# Patient Record
Sex: FEMALE | Race: WHITE | NOT HISPANIC OR LATINO
[De-identification: names, ages, dates, MRNs, and addresses within clinical notes are randomized per-mention and may not be internally consistent; named-entity substitution may affect disease eponyms.]

---

## 2017-01-09 ENCOUNTER — APPOINTMENT (OUTPATIENT)
Age: 57
End: 2017-01-09

## 2017-02-13 ENCOUNTER — APPOINTMENT (OUTPATIENT)
Age: 57
End: 2017-02-13

## 2017-03-06 ENCOUNTER — APPOINTMENT (OUTPATIENT)
Age: 57
End: 2017-03-06

## 2017-03-06 VITALS
WEIGHT: 170 LBS | SYSTOLIC BLOOD PRESSURE: 147 MMHG | HEIGHT: 65 IN | DIASTOLIC BLOOD PRESSURE: 86 MMHG | RESPIRATION RATE: 14 BRPM | HEART RATE: 75 BPM | TEMPERATURE: 97.9 F | BODY MASS INDEX: 28.32 KG/M2

## 2017-03-06 LAB
BASOPHILS # BLD AUTO: 0.04 K/UL
BASOPHILS NFR BLD AUTO: 0.6 %
EOSINOPHIL # BLD AUTO: 0.13 K/UL
EOSINOPHIL NFR BLD AUTO: 2.1 %
HCT VFR BLD CALC: 39.1 %
HGB BLD-MCNC: 13.3 G/DL
IMM GRANULOCYTES NFR BLD AUTO: 0.2 %
LYMPHOCYTES # BLD AUTO: 2.21 K/UL
LYMPHOCYTES NFR BLD AUTO: 35.3 %
MAN DIFF?: NORMAL
MCHC RBC-ENTMCNC: 29.3 PG
MCHC RBC-ENTMCNC: 34 GM/DL
MCV RBC AUTO: 86.1 FL
MONOCYTES # BLD AUTO: 0.43 K/UL
MONOCYTES NFR BLD AUTO: 6.9 %
NEUTROPHILS # BLD AUTO: 3.44 K/UL
NEUTROPHILS NFR BLD AUTO: 54.9 %
PLATELET # BLD AUTO: 279 K/UL
RBC # BLD: 4.54 M/UL
RBC # FLD: 13.5 %
WBC # FLD AUTO: 6.26 K/UL

## 2017-03-07 LAB
AFP-TM SERPL-MCNC: 7.1 NG/ML
ALBUMIN SERPL ELPH-MCNC: 4.8 G/DL
ALP BLD-CCNC: 61 U/L
ALT SERPL-CCNC: 17 U/L
ANION GAP SERPL CALC-SCNC: 17 MMOL/L
AST SERPL-CCNC: 29 U/L
BILIRUB SERPL-MCNC: 0.4 MG/DL
BUN SERPL-MCNC: 29 MG/DL
CALCIUM SERPL-MCNC: 10.1 MG/DL
CHLORIDE SERPL-SCNC: 97 MMOL/L
CO2 SERPL-SCNC: 23 MMOL/L
CREAT SERPL-MCNC: 0.95 MG/DL
GLUCOSE SERPL-MCNC: 102 MG/DL
POTASSIUM SERPL-SCNC: 3.7 MMOL/L
PROT SERPL-MCNC: 7.8 G/DL
SODIUM SERPL-SCNC: 137 MMOL/L

## 2017-03-08 LAB
HCV RNA SERPL NAA DL=5-ACNC: NOT DETECTED IU/ML
HCV RNA SERPL NAA+PROBE-LOG IU: NOT DETECTED LOGIU/ML

## 2017-03-16 ENCOUNTER — APPOINTMENT (OUTPATIENT)
Dept: ULTRASOUND IMAGING | Facility: CLINIC | Age: 57
End: 2017-03-16

## 2017-03-22 ENCOUNTER — APPOINTMENT (OUTPATIENT)
Dept: ULTRASOUND IMAGING | Facility: CLINIC | Age: 57
End: 2017-03-22

## 2017-03-22 ENCOUNTER — OUTPATIENT (OUTPATIENT)
Dept: OUTPATIENT SERVICES | Facility: HOSPITAL | Age: 57
LOS: 1 days | End: 2017-03-22
Payer: MEDICAID

## 2017-03-22 DIAGNOSIS — B18.2 CHRONIC VIRAL HEPATITIS C: ICD-10-CM

## 2017-03-22 PROCEDURE — 76700 US EXAM ABDOM COMPLETE: CPT

## 2018-02-09 ENCOUNTER — LABORATORY RESULT (OUTPATIENT)
Age: 58
End: 2018-02-09

## 2018-02-10 LAB
AFP-TM SERPL-MCNC: 7.2 NG/ML
BASOPHILS # BLD AUTO: 0.04 K/UL
BASOPHILS NFR BLD AUTO: 0.5 %
EOSINOPHIL # BLD AUTO: 0.24 K/UL
EOSINOPHIL NFR BLD AUTO: 3.2 %
HCT VFR BLD CALC: 40.9 %
HGB BLD-MCNC: 13.9 G/DL
IMM GRANULOCYTES NFR BLD AUTO: 0.3 %
LYMPHOCYTES # BLD AUTO: 2.93 K/UL
LYMPHOCYTES NFR BLD AUTO: 38.9 %
MAN DIFF?: NORMAL
MCHC RBC-ENTMCNC: 30.2 PG
MCHC RBC-ENTMCNC: 34 GM/DL
MCV RBC AUTO: 88.9 FL
MONOCYTES # BLD AUTO: 0.6 K/UL
MONOCYTES NFR BLD AUTO: 8 %
NEUTROPHILS # BLD AUTO: 3.7 K/UL
NEUTROPHILS NFR BLD AUTO: 49.1 %
PLATELET # BLD AUTO: 284 K/UL
RBC # BLD: 4.6 M/UL
RBC # FLD: 12.9 %
WBC # FLD AUTO: 7.53 K/UL

## 2018-02-12 ENCOUNTER — APPOINTMENT (OUTPATIENT)
Age: 58
End: 2018-02-12
Payer: MEDICAID

## 2018-02-12 VITALS
BODY MASS INDEX: 27.32 KG/M2 | HEART RATE: 84 BPM | HEIGHT: 65 IN | WEIGHT: 164 LBS | SYSTOLIC BLOOD PRESSURE: 130 MMHG | RESPIRATION RATE: 14 BRPM | DIASTOLIC BLOOD PRESSURE: 84 MMHG | TEMPERATURE: 98.3 F

## 2018-02-12 LAB
ALBUMIN SERPL ELPH-MCNC: 5 G/DL
ALP BLD-CCNC: 60 U/L
ALT SERPL-CCNC: 23 U/L
ANION GAP SERPL CALC-SCNC: 19 MMOL/L
AST SERPL-CCNC: 29 U/L
BILIRUB SERPL-MCNC: 0.3 MG/DL
BUN SERPL-MCNC: 41 MG/DL
CALCIUM SERPL-MCNC: 9.9 MG/DL
CHLORIDE SERPL-SCNC: 93 MMOL/L
CO2 SERPL-SCNC: 23 MMOL/L
CREAT SERPL-MCNC: 1.33 MG/DL
HCV RNA SERPL NAA DL=5-ACNC: NOT DETECTED IU/ML
HCV RNA SERPL NAA+PROBE-LOG IU: NOT DETECTED LOGIU/ML
POTASSIUM SERPL-SCNC: 4.3 MMOL/L
PROT SERPL-MCNC: 8.4 G/DL
SODIUM SERPL-SCNC: 135 MMOL/L

## 2018-02-12 PROCEDURE — 99213 OFFICE O/P EST LOW 20 MIN: CPT

## 2018-03-21 ENCOUNTER — APPOINTMENT (OUTPATIENT)
Age: 58
End: 2018-03-21

## 2018-05-04 ENCOUNTER — APPOINTMENT (OUTPATIENT)
Dept: HEPATOLOGY | Facility: CLINIC | Age: 58
End: 2018-05-04
Payer: MEDICAID

## 2018-05-04 PROCEDURE — 91200 LIVER ELASTOGRAPHY: CPT

## 2019-05-21 ENCOUNTER — APPOINTMENT (OUTPATIENT)
Dept: HEPATOLOGY | Facility: CLINIC | Age: 59
End: 2019-05-21

## 2019-06-12 ENCOUNTER — APPOINTMENT (OUTPATIENT)
Dept: HEPATOLOGY | Facility: CLINIC | Age: 59
End: 2019-06-12

## 2019-09-04 ENCOUNTER — APPOINTMENT (OUTPATIENT)
Dept: HEPATOLOGY | Facility: CLINIC | Age: 59
End: 2019-09-04

## 2019-10-11 ENCOUNTER — INPATIENT (INPATIENT)
Facility: HOSPITAL | Age: 59
LOS: 0 days | Discharge: HOME | End: 2019-10-12
Attending: FAMILY MEDICINE | Admitting: FAMILY MEDICINE
Payer: COMMERCIAL

## 2019-10-11 VITALS
SYSTOLIC BLOOD PRESSURE: 146 MMHG | TEMPERATURE: 98 F | DIASTOLIC BLOOD PRESSURE: 96 MMHG | RESPIRATION RATE: 16 BRPM | OXYGEN SATURATION: 100 % | HEIGHT: 66 IN | WEIGHT: 158.95 LBS | HEART RATE: 88 BPM

## 2019-10-11 DIAGNOSIS — I10 ESSENTIAL (PRIMARY) HYPERTENSION: ICD-10-CM

## 2019-10-11 DIAGNOSIS — R07.1 CHEST PAIN ON BREATHING: ICD-10-CM

## 2019-10-11 DIAGNOSIS — E78.2 MIXED HYPERLIPIDEMIA: ICD-10-CM

## 2019-10-11 DIAGNOSIS — Z72.0 TOBACCO USE: ICD-10-CM

## 2019-10-11 LAB
ALBUMIN SERPL ELPH-MCNC: 4.1 G/DL — SIGNIFICANT CHANGE UP (ref 3.3–5)
ALP SERPL-CCNC: 44 U/L — SIGNIFICANT CHANGE UP (ref 40–120)
ALT FLD-CCNC: 23 U/L — SIGNIFICANT CHANGE UP (ref 12–78)
ANION GAP SERPL CALC-SCNC: 8 MMOL/L — SIGNIFICANT CHANGE UP (ref 5–17)
APTT BLD: 30.7 SEC — SIGNIFICANT CHANGE UP (ref 27.5–36.3)
AST SERPL-CCNC: 22 U/L — SIGNIFICANT CHANGE UP (ref 15–37)
BASOPHILS # BLD AUTO: 0.05 K/UL — SIGNIFICANT CHANGE UP (ref 0–0.2)
BASOPHILS NFR BLD AUTO: 0.7 % — SIGNIFICANT CHANGE UP (ref 0–2)
BILIRUB SERPL-MCNC: 0.3 MG/DL — SIGNIFICANT CHANGE UP (ref 0.2–1.2)
BUN SERPL-MCNC: 30 MG/DL — HIGH (ref 7–23)
CALCIUM SERPL-MCNC: 9.2 MG/DL — SIGNIFICANT CHANGE UP (ref 8.5–10.1)
CHLORIDE SERPL-SCNC: 102 MMOL/L — SIGNIFICANT CHANGE UP (ref 96–108)
CK MB BLD-MCNC: <0.7 % — SIGNIFICANT CHANGE UP (ref 0–3.5)
CK MB CFR SERPL CALC: <1 NG/ML — SIGNIFICANT CHANGE UP (ref 0.5–3.6)
CK SERPL-CCNC: 139 U/L — SIGNIFICANT CHANGE UP (ref 26–192)
CO2 SERPL-SCNC: 27 MMOL/L — SIGNIFICANT CHANGE UP (ref 22–31)
CREAT SERPL-MCNC: 1.05 MG/DL — SIGNIFICANT CHANGE UP (ref 0.5–1.3)
EOSINOPHIL # BLD AUTO: 0.15 K/UL — SIGNIFICANT CHANGE UP (ref 0–0.5)
EOSINOPHIL NFR BLD AUTO: 2 % — SIGNIFICANT CHANGE UP (ref 0–6)
GLUCOSE SERPL-MCNC: 100 MG/DL — HIGH (ref 70–99)
HCT VFR BLD CALC: 35.9 % — SIGNIFICANT CHANGE UP (ref 34.5–45)
HGB BLD-MCNC: 12.5 G/DL — SIGNIFICANT CHANGE UP (ref 11.5–15.5)
IMM GRANULOCYTES NFR BLD AUTO: 0.3 % — SIGNIFICANT CHANGE UP (ref 0–1.5)
INR BLD: 1.09 RATIO — SIGNIFICANT CHANGE UP (ref 0.88–1.16)
LYMPHOCYTES # BLD AUTO: 1.89 K/UL — SIGNIFICANT CHANGE UP (ref 1–3.3)
LYMPHOCYTES # BLD AUTO: 25.3 % — SIGNIFICANT CHANGE UP (ref 13–44)
MCHC RBC-ENTMCNC: 30.2 PG — SIGNIFICANT CHANGE UP (ref 27–34)
MCHC RBC-ENTMCNC: 34.8 GM/DL — SIGNIFICANT CHANGE UP (ref 32–36)
MCV RBC AUTO: 86.7 FL — SIGNIFICANT CHANGE UP (ref 80–100)
MONOCYTES # BLD AUTO: 0.58 K/UL — SIGNIFICANT CHANGE UP (ref 0–0.9)
MONOCYTES NFR BLD AUTO: 7.8 % — SIGNIFICANT CHANGE UP (ref 2–14)
NEUTROPHILS # BLD AUTO: 4.77 K/UL — SIGNIFICANT CHANGE UP (ref 1.8–7.4)
NEUTROPHILS NFR BLD AUTO: 63.9 % — SIGNIFICANT CHANGE UP (ref 43–77)
NRBC # BLD: 0 /100 WBCS — SIGNIFICANT CHANGE UP (ref 0–0)
PLATELET # BLD AUTO: 306 K/UL — SIGNIFICANT CHANGE UP (ref 150–400)
POTASSIUM SERPL-MCNC: 3.4 MMOL/L — LOW (ref 3.5–5.3)
POTASSIUM SERPL-SCNC: 3.4 MMOL/L — LOW (ref 3.5–5.3)
PROT SERPL-MCNC: 7.4 GM/DL — SIGNIFICANT CHANGE UP (ref 6–8.3)
PROTHROM AB SERPL-ACNC: 12.2 SEC — SIGNIFICANT CHANGE UP (ref 10–12.9)
RBC # BLD: 4.14 M/UL — SIGNIFICANT CHANGE UP (ref 3.8–5.2)
RBC # FLD: 12.6 % — SIGNIFICANT CHANGE UP (ref 10.3–14.5)
SODIUM SERPL-SCNC: 137 MMOL/L — SIGNIFICANT CHANGE UP (ref 135–145)
TROPONIN I SERPL-MCNC: <.015 NG/ML — SIGNIFICANT CHANGE UP (ref 0.01–0.04)
WBC # BLD: 7.46 K/UL — SIGNIFICANT CHANGE UP (ref 3.8–10.5)
WBC # FLD AUTO: 7.46 K/UL — SIGNIFICANT CHANGE UP (ref 3.8–10.5)

## 2019-10-11 PROCEDURE — 99223 1ST HOSP IP/OBS HIGH 75: CPT

## 2019-10-11 PROCEDURE — 71046 X-RAY EXAM CHEST 2 VIEWS: CPT | Mod: 26

## 2019-10-11 PROCEDURE — 99285 EMERGENCY DEPT VISIT HI MDM: CPT

## 2019-10-11 PROCEDURE — 93010 ELECTROCARDIOGRAM REPORT: CPT

## 2019-10-11 RX ORDER — ASPIRIN/CALCIUM CARB/MAGNESIUM 324 MG
324 TABLET ORAL DAILY
Refills: 0 | Status: DISCONTINUED | OUTPATIENT
Start: 2019-10-11 | End: 2019-10-12

## 2019-10-11 RX ORDER — QUETIAPINE FUMARATE 200 MG/1
100 TABLET, FILM COATED ORAL AT BEDTIME
Refills: 0 | Status: DISCONTINUED | OUTPATIENT
Start: 2019-10-11 | End: 2019-10-12

## 2019-10-11 RX ORDER — BUDESONIDE AND FORMOTEROL FUMARATE DIHYDRATE 160; 4.5 UG/1; UG/1
2 AEROSOL RESPIRATORY (INHALATION)
Refills: 0 | Status: DISCONTINUED | OUTPATIENT
Start: 2019-10-11 | End: 2019-10-12

## 2019-10-11 RX ORDER — ALPRAZOLAM 0.25 MG
0.5 TABLET ORAL AT BEDTIME
Refills: 0 | Status: DISCONTINUED | OUTPATIENT
Start: 2019-10-11 | End: 2019-10-12

## 2019-10-11 RX ORDER — SIMVASTATIN 20 MG/1
20 TABLET, FILM COATED ORAL AT BEDTIME
Refills: 0 | Status: DISCONTINUED | OUTPATIENT
Start: 2019-10-11 | End: 2019-10-12

## 2019-10-11 RX ORDER — MONTELUKAST 4 MG/1
10 TABLET, CHEWABLE ORAL DAILY
Refills: 0 | Status: DISCONTINUED | OUTPATIENT
Start: 2019-10-11 | End: 2019-10-12

## 2019-10-11 RX ORDER — SERTRALINE 25 MG/1
25 TABLET, FILM COATED ORAL DAILY
Refills: 0 | Status: DISCONTINUED | OUTPATIENT
Start: 2019-10-11 | End: 2019-10-12

## 2019-10-11 RX ORDER — ALBUTEROL 90 UG/1
2 AEROSOL, METERED ORAL
Refills: 0 | Status: DISCONTINUED | OUTPATIENT
Start: 2019-10-11 | End: 2019-10-12

## 2019-10-11 RX ORDER — INFLUENZA VIRUS VACCINE 15; 15; 15; 15 UG/.5ML; UG/.5ML; UG/.5ML; UG/.5ML
0.5 SUSPENSION INTRAMUSCULAR ONCE
Refills: 0 | Status: COMPLETED | OUTPATIENT
Start: 2019-10-11 | End: 2019-10-11

## 2019-10-11 RX ORDER — HYDROCHLOROTHIAZIDE 25 MG
12.5 TABLET ORAL DAILY
Refills: 0 | Status: DISCONTINUED | OUTPATIENT
Start: 2019-10-11 | End: 2019-10-12

## 2019-10-11 RX ORDER — ALPRAZOLAM 0.25 MG
1 TABLET ORAL
Qty: 0 | Refills: 0 | DISCHARGE

## 2019-10-11 RX ORDER — AMLODIPINE BESYLATE 2.5 MG/1
5 TABLET ORAL DAILY
Refills: 0 | Status: DISCONTINUED | OUTPATIENT
Start: 2019-10-11 | End: 2019-10-12

## 2019-10-11 RX ADMIN — Medication 324 MILLIGRAM(S): at 17:46

## 2019-10-11 RX ADMIN — Medication 0.5 MILLIGRAM(S): at 20:39

## 2019-10-11 RX ADMIN — Medication 12.5 MILLIGRAM(S): at 21:56

## 2019-10-11 RX ADMIN — MONTELUKAST 10 MILLIGRAM(S): 4 TABLET, CHEWABLE ORAL at 21:56

## 2019-10-11 RX ADMIN — AMLODIPINE BESYLATE 5 MILLIGRAM(S): 2.5 TABLET ORAL at 21:56

## 2019-10-11 RX ADMIN — SIMVASTATIN 20 MILLIGRAM(S): 20 TABLET, FILM COATED ORAL at 21:56

## 2019-10-11 RX ADMIN — QUETIAPINE FUMARATE 100 MILLIGRAM(S): 200 TABLET, FILM COATED ORAL at 21:56

## 2019-10-11 NOTE — ED ADULT NURSE NOTE - OBJECTIVE STATEMENT
received er bed 16 c/o l upper chest pain intermittently x 1 month worse since last night now noted with radiation of pain to l arm and l jaw/l ear area since morning c/o nausea and shortness of breath sent by pmd for eval hx anxiety, asthma lungs clear b/l skin warm dry color good

## 2019-10-11 NOTE — H&P ADULT - PROBLEM SELECTOR PLAN 1
- unlikely ACS or cardiac origen  - trend trop  - obtain tte  - Cardiology Consult in am  - Stress test as o/p

## 2019-10-11 NOTE — ED PROVIDER NOTE - NS ED ROS FT
Constitutional: no fevers or chills  Cardiac: no palpitations, +chest pain, left sides shoulder pain, radiating down the arm, and numbness in the fingers  Lungs: no shortness of breath, wheezes, +cough  Abd: no abd pain,  vomiting, diarrhea, +nausea  Genitourinary: no dysuria, increased urinary frequency, hematuria  Neurology: no sensorimotor deficits, no dizziness, no headache, no visual changes  Skin: no rashes  All other ROS negative except as per HPI

## 2019-10-11 NOTE — ED PROVIDER NOTE - PHYSICAL EXAMINATION
Gen: no acute distress, well appearing, awake, alert and oriented x 3  Cardiac: regular rate and rhythm, +S1S2, +reproducible pain on chest wall  Pulm: Clear to auscultation bilaterally  Abd: soft, nontender, nondistended, no guarding  Back: neg CVA ttp, nontender spine  Extremity: no edema, no deformity, warm and well perfused, FROM all extremities    Neuro: awake, alert, oriented x 3, sensorimotor intact

## 2019-10-11 NOTE — H&P ADULT - HISTORY OF PRESENT ILLNESS
59 y/o female with HTN. current tobacco user, Anxiety, and positive family history of CAD that presents to the ED for a 2 day history of worsening left chest wall, axillary, and left arm pain.  Patient states that her symptoms are intermittent, 5/10, worsened with exertion and not associated with nausea, vomiting, shortness of breath, or other symptom.  Patient states that she has had dry cough over the last 2-3 weeks.  Denies other symptoms.     ED course  EKG wnl  Trop wnl  chest pain is reproducible on palpation of left chest wall

## 2019-10-11 NOTE — H&P ADULT - ASSESSMENT
57 y/o female admitted with atypical chest pain    Admit to Telemetry  OOB to chair and ambulate as tolerated  Vitals per routine

## 2019-10-11 NOTE — ED PROVIDER NOTE - OBJECTIVE STATEMENT
Pt is a 59 y/o female with a PMHx of HTN, HLD, anxiety, and ashtma, who presents to the ER for left sided chest pain. Pt states her CP first began yesterday, and worsened last night, radiating to her collarbone and shoulder with numbness in her fingers. Pt states is a stabbing pain, which has spread to her jaw and ear. Her doctor told her to come to the ER to receive further testing.  She did take Tylenol, and Motrin, without relief. Pt denies does have a cough and nausea, but denies fevers, chills, abdominal pain, urinary symptoms, SOB, diarrhea, or constipation. She currently takes Xanax and Seroquel, and does have a hx of heart disease and COPD in the family. Pt is a current, everyday smoker, occasional drinker, no drug use. 59 y/o female with a PMHx of HTN, HLD, anxiety, and asthma, who presents to the ER for left sided chest pain. Pt states her CP first began yesterday, and worsened last night, radiating to her collarbone and shoulder with numbness in her fingers. Pt states is a stabbing pain, which has spread to her jaw and ear. Her doctor told her to come to the ER to receive further testing.  She did take Tylenol, and Motrin, without relief. Pt denies does have a cough and nausea, but denies fevers, chills, abdominal pain, urinary symptoms, SOB, diarrhea, or constipation. She currently takes Xanax and Seroquel, and does have a hx of heart disease and COPD in the family. Pt is a current, everyday smoker, occasional drinker, no drug use.

## 2019-10-12 ENCOUNTER — TRANSCRIPTION ENCOUNTER (OUTPATIENT)
Age: 59
End: 2019-10-12

## 2019-10-12 VITALS
DIASTOLIC BLOOD PRESSURE: 81 MMHG | HEART RATE: 63 BPM | SYSTOLIC BLOOD PRESSURE: 143 MMHG | TEMPERATURE: 98 F | RESPIRATION RATE: 19 BRPM | OXYGEN SATURATION: 97 %

## 2019-10-12 LAB
HCV AB S/CO SERPL IA: 14.86 S/CO — HIGH (ref 0–0.99)
HCV AB SERPL-IMP: REACTIVE
TROPONIN I SERPL-MCNC: <.015 NG/ML — SIGNIFICANT CHANGE UP (ref 0.01–0.04)

## 2019-10-12 PROCEDURE — 99223 1ST HOSP IP/OBS HIGH 75: CPT

## 2019-10-12 PROCEDURE — 99239 HOSP IP/OBS DSCHRG MGMT >30: CPT

## 2019-10-12 RX ORDER — IBUPROFEN 200 MG
400 TABLET ORAL EVERY 6 HOURS
Refills: 0 | Status: DISCONTINUED | OUTPATIENT
Start: 2019-10-12 | End: 2019-10-12

## 2019-10-12 RX ORDER — IBUPROFEN 200 MG
1 TABLET ORAL
Qty: 0 | Refills: 0 | DISCHARGE
Start: 2019-10-12

## 2019-10-12 RX ADMIN — AMLODIPINE BESYLATE 5 MILLIGRAM(S): 2.5 TABLET ORAL at 11:13

## 2019-10-12 RX ADMIN — BUDESONIDE AND FORMOTEROL FUMARATE DIHYDRATE 2 PUFF(S): 160; 4.5 AEROSOL RESPIRATORY (INHALATION) at 06:00

## 2019-10-12 RX ADMIN — Medication 400 MILLIGRAM(S): at 16:10

## 2019-10-12 RX ADMIN — Medication 12.5 MILLIGRAM(S): at 11:13

## 2019-10-12 RX ADMIN — Medication 324 MILLIGRAM(S): at 11:13

## 2019-10-12 RX ADMIN — MONTELUKAST 10 MILLIGRAM(S): 4 TABLET, CHEWABLE ORAL at 11:13

## 2019-10-12 RX ADMIN — BUDESONIDE AND FORMOTEROL FUMARATE DIHYDRATE 2 PUFF(S): 160; 4.5 AEROSOL RESPIRATORY (INHALATION) at 17:39

## 2019-10-12 RX ADMIN — Medication 400 MILLIGRAM(S): at 15:24

## 2019-10-12 RX ADMIN — SERTRALINE 25 MILLIGRAM(S): 25 TABLET, FILM COATED ORAL at 11:13

## 2019-10-12 RX ADMIN — ALBUTEROL 2 PUFF(S): 90 AEROSOL, METERED ORAL at 17:38

## 2019-10-12 RX ADMIN — Medication 0.5 MILLIGRAM(S): at 00:26

## 2019-10-12 NOTE — DISCHARGE NOTE PROVIDER - NSDCCPCAREPLAN_GEN_ALL_CORE_FT
PRINCIPAL DISCHARGE DIAGNOSIS  Diagnosis: Acute costochondritis  Assessment and Plan of Treatment: - Take motrin as prescribed  - follow up with cardiology as outpatient      SECONDARY DISCHARGE DIAGNOSES  Diagnosis: Anxiety, generalized  Assessment and Plan of Treatment: Take medications as prescribed    Diagnosis: Essential hypertension  Assessment and Plan of Treatment: take medications as prescribed    Diagnosis: Mixed hyperlipidemia  Assessment and Plan of Treatment: take medications as prescribed    Diagnosis: Tobacco user  Assessment and Plan of Treatment: Cessation Counseling provided. PRINCIPAL DISCHARGE DIAGNOSIS  Diagnosis: Acute costochondritis  Assessment and Plan of Treatment: - Take motrin as prescribed  - follow up with cardiology as outpatient      SECONDARY DISCHARGE DIAGNOSES  Diagnosis: Hepatitis C  Assessment and Plan of Treatment: please follow up with Dr. Monroy, call office for apointment as soon as possible to start treatment and further workup.    Diagnosis: Anxiety, generalized  Assessment and Plan of Treatment: Take medications as prescribed    Diagnosis: Mixed hyperlipidemia  Assessment and Plan of Treatment: take medications as prescribed    Diagnosis: Essential hypertension  Assessment and Plan of Treatment: take medications as prescribed    Diagnosis: Tobacco user  Assessment and Plan of Treatment: Cessation Counseling provided. PRINCIPAL DISCHARGE DIAGNOSIS  Diagnosis: Acute costochondritis  Assessment and Plan of Treatment: - Take motrin as prescribed  - follow up with cardiology as outpatient      SECONDARY DISCHARGE DIAGNOSES  Diagnosis: Hepatitis C  Assessment and Plan of Treatment: please follow up with GI Dr. Monroy or private liver specialist, call office for apointment as soon as possible to start treatment and further workup.    Diagnosis: Anxiety, generalized  Assessment and Plan of Treatment: Take medications as prescribed    Diagnosis: Mixed hyperlipidemia  Assessment and Plan of Treatment: take medications as prescribed    Diagnosis: Essential hypertension  Assessment and Plan of Treatment: take medications as prescribed    Diagnosis: Tobacco user  Assessment and Plan of Treatment: Cessation Counseling provided.

## 2019-10-12 NOTE — DISCHARGE NOTE PROVIDER - CARE PROVIDERS DIRECT ADDRESSES
[email or web address]

## 2019-10-12 NOTE — CONSULT NOTE ADULT - SUBJECTIVE AND OBJECTIVE BOX
CHIEF COMPLAINT:  Patient is a 58y old  Female who presents with a chief complaint of chest pain (11 Oct 2019 19:42)      HPI:  57 y/o female with HTN. current tobacco user, Anxiety, and positive family history of CAD that presents to the ED for a 2 day history of worsening left chest wall, axillary, and left arm pain.  Patient states that her symptoms are intermittent, 5/10, worsened with exertion and not associated with nausea, vomiting, shortness of breath, or other symptom.  Patient states that she has had dry cough over the last 2-3 weeks.  Denies other symptoms.   EKG wnl  Trop wnl  chest pain is reproducible on palpation of left chest wall    ALLERGIES:  No Known Allergies    Home Medications:  albuterol 90 mcg/inh inhalation aerosol: 2 puff(s) inhaled 4 times a day (11 Oct 2019 19:38)  amLODIPine 5 mg oral tablet: 1 tab(s) orally once a day (11 Oct 2019 19:38)  hydroCHLOROthiazide 12.5 mg oral capsule: 1 cap(s) orally once a day (11 Oct 2019 19:38)  ibuprofen 400 mg oral tablet: 1 tab(s) orally every 6 hours, As needed, Mild Pain (1 - 3) (12 Oct 2019 14:52)  SEROquel 100 mg oral tablet:  orally once a day (at bedtime) (11 Oct 2019 19:38)  sertraline 25 mg oral tablet: 1 tab(s) orally once a day (11 Oct 2019 19:38)  simvastatin 20 mg oral tablet: 1 tab(s) orally once a day (at bedtime) (11 Oct 2019 19:38)  Singulair 10 mg oral tablet: 1 tab(s) orally once a day (in the evening) (11 Oct 2019 19:38)  Symbicort 160 mcg-4.5 mcg/inh inhalation aerosol: 2 puff(s) inhaled 2 times a day, As Needed (11 Oct 2019 19:38)  Xanax 0.5 mg oral tablet: 1 tab(s) orally once a day (at bedtime), As Needed (11 Oct 2019 19:38)      PAST MEDICAL & SURGICAL HISTORY:  HLD (hyperlipidemia)  Asthma  Anxiety  Hypertension  No significant past surgical history      FAMILY HISTORY:  FH: myocardial infarction: Mother, Father, and Brother      SOCIAL HISTORY:  Current everyday tobacco user    REVIEW OF SYSTEMS:  General:  No wt loss, fevers, chills, night sweats  Eyes:  Good vision, no reported pain  ENT:  No sore throat, pain, runny nose, dysphagia  CV:  No pain, palpitations, hypo/hypertension  Resp:  No dyspnea, cough, tachypnea, wheezing  GI:  No pain, nausea, vomiting, diarrhea, constipation  :  No pain, bleeding, incontinence, nocturia  Muscle:  No pain, weakness  Breast:  No pain, abscess, mass, discharge  Neuro:  No weakness, tingling, memory problems  Psych:  No fatigue, insomnia, mood problems, depression  Endocrine:  No polyuria, polydipsia, cold/heat intolerance  Heme:  No petechiae, ecchymosis, easy bruisability  Skin:  No rash, edema    PHYSICAL EXAM:  Vital Signs:  Vital Signs Last 24 Hrs  T(C): 37.1 (12 Oct 2019 11:09), Max: 37.1 (12 Oct 2019 11:09)  T(F): 98.7 (12 Oct 2019 11:09), Max: 98.7 (12 Oct 2019 11:09)  HR: 68 (12 Oct 2019 11:09) (60 - 68)  BP: 134/76 (12 Oct 2019 11:09) (101/69 - 163/90)  RR: 18 (12 Oct 2019 11:09) (18 - 20)  SpO2: 97% (12 Oct 2019 11:09) (96% - 98%)  I&O's Summary    11 Oct 2019 07:01  -  12 Oct 2019 07:00  --------------------------------------------------------  IN: 200 mL / OUT: 350 mL / NET: -150 mL      Tele: SR  General:  Appears stated age, well-groomed, well-nourished, no distress  HEENT:  NC/AT, patent nares w/ pink mucosa, OP clear w/o lesions, EOMI, conjunctivae clear, no thyromegaly, nodules, adenopathy, no JVD  Chest:  Full & symmetric excursion, no increased effort, breath sounds clear  Cardiovascular:  Regular rhythm, S1, S2, no murmur  Abdomen:  Soft, non-tender, non-distended, normoactive bowel sounds  Extremities:  no edema  Skin:  warm/dry  Musculoskeletal:  Full ROM in all joints w/o swelling/tenderness/effusion  Neuro/Psych:  Alert, oriented    LABORATORY:                          12.5   7.46  )-----------( 306      ( 11 Oct 2019 15:23 )             35.9     10-11    137  |  102  |  30<H>  ----------------------------<  100<H>  3.4<L>   |  27  |  1.05    Ca    9.2      11 Oct 2019 15:23    TPro  7.4  /  Alb  4.1  /  TBili  0.3  /  DBili  x   /  AST  22  /  ALT  23  /  AlkPhos  44  10-11      CARDIAC MARKERS ( 12 Oct 2019 08:16 )  <.015 ng/mL / x     / x     / x     / x      CARDIAC MARKERS ( 11 Oct 2019 23:46 )  <.015 ng/mL / x     / x     / x     / x      CARDIAC MARKERS ( 11 Oct 2019 15:23 )  <.015 ng/mL / x     / 139 U/L / x     / <1.0 ng/mL    LIVER FUNCTIONS - ( 11 Oct 2019 15:23 )  Alb: 4.1 g/dL / Pro: 7.4 gm/dL / ALK PHOS: 44 U/L / ALT: 23 U/L / AST: 22 U/L / GGT: x           PT/INR - ( 11 Oct 2019 15:23 )   PT: 12.2 sec;   INR: 1.09 ratio         PTT - ( 11 Oct 2019 15:23 )  PTT:30.7 sec    IMAGING:  < from: Cardiac Tredmill Stress Test (Non Imaging) (07.01.13 @ 00:00) >  Exercise capacity: 12 METS, Excellent for age and gender.  Chest Pain: No chest pain with exercise.  Symptom: Shortness of breath.  HR Response: Appropriate.  BP Response: Appropriate.  Heart Rhythm:Normal Sinus Rhythm - 71 BPM.  Baseline ECG: No significant ST abnormalities.  ECG Abnormalities: None.  ECG Changes: ST Depression: 0.5-1 mm slowly upsloping in  leads V4, V5, V6 started at 01:00 min of exercise at HR of  99.  All Changes resolved by 5 minutes of recovery.  Arrhythmia: None.  [facility] treadmill score=8 (low risk of cardiac events).    < end of copied text >    ASSESSMENT:  57 y/o female with HTN. current tobacco user, Anxiety, and positive family history of CAD that presents to the ED for a 2 day history of worsening left chest wall, axillary, and left arm pain.  Patient states that her symptoms are intermittent, 5/10, worsened with exertion and not associated with nausea, vomiting, shortness of breath, or other symptom.  Patient states that she has had dry cough over the last 2-3 weeks.  Denies other symptoms.   EKG wnl  Trop wnl  chest pain is reproducible on palpation of left chest wall    PLAN:       MEDICATIONS  (STANDING):  amLODIPine   Tablet 5 milliGRAM(s) Oral daily  buDESOnide 160 MICROgram(s)/formoterol 4.5 MICROgram(s) Inhaler 2 Puff(s) Inhalation two times a day  hydrochlorothiazide 12.5 milliGRAM(s) Oral daily  influenza   Vaccine 0.5 milliLiter(s) IntraMuscular once  montelukast 10 milliGRAM(s) Oral daily  QUEtiapine 100 milliGRAM(s) Oral at bedtime  sertraline 25 milliGRAM(s) Oral daily  simvastatin 20 milliGRAM(s) Oral at bedtime      Kit Payton MD, FACC, TOMER, DELMA, FACP  Director, Heart Failure Services  [facility]  , Department of Cardiology  [facility] CHIEF COMPLAINT:  Patient is a 58y old  Female who presents with a chief complaint of chest pain (11 Oct 2019 19:42)      HPI:  59 y/o female with HTN. current tobacco user, Anxiety, and positive family history of CAD that presents to the ED for a 2 day history of worsening left chest wall, axillary, and left arm pain.  Patient states that her symptoms are intermittent, 5/10, worsened with exertion and not associated with nausea, vomiting, shortness of breath, or other symptom.  Patient states that she has had dry cough over the last 2-3 weeks.  Denies other symptoms.   EKG wnl  Trop wnl  chest pain is reproducible on palpation of left chest wall    ALLERGIES:  No Known Allergies    Home Medications:  albuterol 90 mcg/inh inhalation aerosol: 2 puff(s) inhaled 4 times a day (11 Oct 2019 19:38)  amLODIPine 5 mg oral tablet: 1 tab(s) orally once a day (11 Oct 2019 19:38)  hydroCHLOROthiazide 12.5 mg oral capsule: 1 cap(s) orally once a day (11 Oct 2019 19:38)  ibuprofen 400 mg oral tablet: 1 tab(s) orally every 6 hours, As needed, Mild Pain (1 - 3) (12 Oct 2019 14:52)  SEROquel 100 mg oral tablet:  orally once a day (at bedtime) (11 Oct 2019 19:38)  sertraline 25 mg oral tablet: 1 tab(s) orally once a day (11 Oct 2019 19:38)  simvastatin 20 mg oral tablet: 1 tab(s) orally once a day (at bedtime) (11 Oct 2019 19:38)  Singulair 10 mg oral tablet: 1 tab(s) orally once a day (in the evening) (11 Oct 2019 19:38)  Symbicort 160 mcg-4.5 mcg/inh inhalation aerosol: 2 puff(s) inhaled 2 times a day, As Needed (11 Oct 2019 19:38)  Xanax 0.5 mg oral tablet: 1 tab(s) orally once a day (at bedtime), As Needed (11 Oct 2019 19:38)      PAST MEDICAL & SURGICAL HISTORY:  HLD (hyperlipidemia)  Asthma  Anxiety  Hypertension  No significant past surgical history      FAMILY HISTORY:  FH: myocardial infarction: Mother, Father, and Brother      SOCIAL HISTORY:  Current everyday tobacco user    REVIEW OF SYSTEMS:  General:  No wt loss, fevers, chills, night sweats  Eyes:  Good vision, no reported pain  ENT:  No sore throat, pain, runny nose, dysphagia  CV:  No pain, palpitations, hypo/hypertension  Resp:  No dyspnea, cough, tachypnea, wheezing  GI:  No pain, nausea, vomiting, diarrhea, constipation  :  No pain, bleeding, incontinence, nocturia  Muscle:  No pain, weakness  Breast:  No pain, abscess, mass, discharge  Neuro:  No weakness, tingling, memory problems  Psych:  No fatigue, insomnia, mood problems, depression  Endocrine:  No polyuria, polydipsia, cold/heat intolerance  Heme:  No petechiae, ecchymosis, easy bruisability  Skin:  No rash, edema    PHYSICAL EXAM:  Vital Signs:  Vital Signs Last 24 Hrs  T(C): 37.1 (12 Oct 2019 11:09), Max: 37.1 (12 Oct 2019 11:09)  T(F): 98.7 (12 Oct 2019 11:09), Max: 98.7 (12 Oct 2019 11:09)  HR: 68 (12 Oct 2019 11:09) (60 - 68)  BP: 134/76 (12 Oct 2019 11:09) (101/69 - 163/90)  RR: 18 (12 Oct 2019 11:09) (18 - 20)  SpO2: 97% (12 Oct 2019 11:09) (96% - 98%)  I&O's Summary    11 Oct 2019 07:01  -  12 Oct 2019 07:00  --------------------------------------------------------  IN: 200 mL / OUT: 350 mL / NET: -150 mL      Tele: SR  General:  Appears stated age, well-groomed, well-nourished, no distress  HEENT:  NC/AT, patent nares w/ pink mucosa, OP clear w/o lesions, EOMI, conjunctivae clear, no thyromegaly, nodules, adenopathy, no JVD  Chest:  Full & symmetric excursion, no increased effort, breath sounds clear  Cardiovascular:  Regular rhythm, S1, S2, no murmur  Abdomen:  Soft, non-tender, non-distended, normoactive bowel sounds  Extremities:  no edema  Skin:  warm/dry  Musculoskeletal:  Full ROM in all joints w/o swelling/tenderness/effusion  Neuro/Psych:  Alert, oriented    LABORATORY:                          12.5   7.46  )-----------( 306      ( 11 Oct 2019 15:23 )             35.9     10-11    137  |  102  |  30<H>  ----------------------------<  100<H>  3.4<L>   |  27  |  1.05    Ca    9.2      11 Oct 2019 15:23    TPro  7.4  /  Alb  4.1  /  TBili  0.3  /  DBili  x   /  AST  22  /  ALT  23  /  AlkPhos  44  10-11      CARDIAC MARKERS ( 12 Oct 2019 08:16 )  <.015 ng/mL / x     / x     / x     / x      CARDIAC MARKERS ( 11 Oct 2019 23:46 )  <.015 ng/mL / x     / x     / x     / x      CARDIAC MARKERS ( 11 Oct 2019 15:23 )  <.015 ng/mL / x     / 139 U/L / x     / <1.0 ng/mL    LIVER FUNCTIONS - ( 11 Oct 2019 15:23 )  Alb: 4.1 g/dL / Pro: 7.4 gm/dL / ALK PHOS: 44 U/L / ALT: 23 U/L / AST: 22 U/L / GGT: x           PT/INR - ( 11 Oct 2019 15:23 )   PT: 12.2 sec;   INR: 1.09 ratio         PTT - ( 11 Oct 2019 15:23 )  PTT:30.7 sec    IMAGING:  < from: Cardiac Tredmill Stress Test (Non Imaging) (07.01.13 @ 00:00) >  Exercise capacity: 12 METS, Excellent for age and gender.  Chest Pain: No chest pain with exercise.  Symptom: Shortness of breath.  HR Response: Appropriate.  BP Response: Appropriate.  Heart Rhythm:Normal Sinus Rhythm - 71 BPM.  Baseline ECG: No significant ST abnormalities.  ECG Abnormalities: None.  ECG Changes: ST Depression: 0.5-1 mm slowly upsloping in  leads V4, V5, V6 started at 01:00 min of exercise at HR of  99.  All Changes resolved by 5 minutes of recovery.  Arrhythmia: None.  [facility] treadmill score=8 (low risk of cardiac events).    < end of copied text >    ASSESSMENT:  59 y/o female with HTN. current tobacco user, Anxiety, and positive family history of CAD that presents to the ED for a 2 day history of worsening left chest wall, axillary, and left arm pain.  Patient states that her symptoms are intermittent, 5/10, worsened with exertion and not associated with nausea, vomiting, shortness of breath, or other symptom.  Patient states that she has had dry cough over the last 2-3 weeks.  Denies other symptoms.   EKG wnl  Trop wnl  chest pain is reproducible on palpation of left chest wall  Likely element of costrochodritis and No acs at present.  Prelim echo normal EF and G1DD.    PLAN:       MEDICATIONS  (STANDING):  amLODIPine   Tablet 5 milliGRAM(s) Oral daily  buDESOnide 160 MICROgram(s)/formoterol 4.5 MICROgram(s) Inhaler 2 Puff(s) Inhalation two times a day  hydrochlorothiazide 12.5 milliGRAM(s) Oral daily  influenza   Vaccine 0.5 milliLiter(s) IntraMuscular once  montelukast 10 milliGRAM(s) Oral daily  QUEtiapine 100 milliGRAM(s) Oral at bedtime  sertraline 25 milliGRAM(s) Oral daily  simvastatin 20 milliGRAM(s) Oral at bedtime    NSAIDs/ smoking cessation.  Can be d/c'd home with outpt cardiac f/u fine.    Kit Payton MD, FACC, DELMA JEFF, FACP  Director, Heart Failure Services  [facility]  , Department of Cardiology  [facility]

## 2019-10-12 NOTE — DISCHARGE NOTE PROVIDER - HOSPITAL COURSE
Patient is a 58y old  Female who presents with a chief complaint of chest pain (11 Oct 2019 19:42)        HPI:    59 y/o female with HTN. current tobacco user, Anxiety, and positive family history of CAD that presents to the ED for a 2 day history of worsening left chest wall, axillary, and left arm pain.  Patient states that her symptoms are intermittent, 5/10, worsened with exertion and not associated with nausea, vomiting, shortness of breath, or other symptom.  Patient states that she has had dry cough over the last 2-3 weeks.  Denies other symptoms.         ED course    EKG wnl    Trop wnl    chest pain is reproducible on palpation of left chest wall (11 Oct 2019 19:42)        SUBJECTIVE & OBJECTIVE: Pt seen and examined at bedside.     PHYSICAL EXAM:    ICU Vital Signs Last 24 Hrs    T(C): 37.1 (12 Oct 2019 11:09), Max: 37.1 (12 Oct 2019 11:09)    T(F): 98.7 (12 Oct 2019 11:09), Max: 98.7 (12 Oct 2019 11:09)    HR: 68 (12 Oct 2019 11:09) (60 - 68)    BP: 134/76 (12 Oct 2019 11:09) (101/69 - 163/90)    RR: 18 (12 Oct 2019 11:09) (18 - 20)    SpO2: 97% (12 Oct 2019 11:09) (96% - 98%)    Daily  Daily Weight in k.8 (12 Oct 2019 05:23)        GENERAL: NAD, well-groomed, well-developed    HEAD:  Atraumatic, Normocephalic    EYES: EOMI, PERRLA, conjunctiva and sclera clear    ENMT: Moist mucous membranes    NECK: Supple, No JVD    NERVOUS SYSTEM:  Alert & Oriented X3, Motor Strength 5/5 B/L upper and lower extremities; DTRs 2+ intact and symmetric    CHEST/LUNG: Clear to auscultation bilaterally; No rales, rhonchi, wheezing, or rubs    HEART: Regular rate and rhythm; No murmurs, rubs, or gallops    ABDOMEN: Soft, Nontender, Nondistended; Bowel sounds present    EXTREMITIES:  2+ Peripheral Pulses, No clubbing, cyanosis, or edema        MEDICATIONS  (STANDING):    amLODIPine   Tablet 5 milliGRAM(s) Oral daily    buDESOnide 160 MICROgram(s)/formoterol 4.5 MICROgram(s) Inhaler 2 Puff(s) Inhalation two times a day    hydrochlorothiazide 12.5 milliGRAM(s) Oral daily    influenza   Vaccine 0.5 milliLiter(s) IntraMuscular once    montelukast 10 milliGRAM(s) Oral daily    QUEtiapine 100 milliGRAM(s) Oral at bedtime    sertraline 25 milliGRAM(s) Oral daily    simvastatin 20 milliGRAM(s) Oral at bedtime        MEDICATIONS  (PRN):    ALBUTerol    90 MICROgram(s) HFA Inhaler 2 Puff(s) Inhalation four times a day PRN Bronchospasm    ALPRAZolam 0.5 milliGRAM(s) Oral at bedtime PRN anxiety    ibuprofen  Tablet. 400 milliGRAM(s) Oral every 6 hours PRN Mild Pain (1 - 3)            LABS:                            12.5     7.46  )-----------( 306      ( 11 Oct 2019 15:23 )               35.9         10-11        137  |  102  |  30<H>    ----------------------------<  100<H>    3.4<L>   |  27  |  1.05        Ca    9.2      11 Oct 2019 15:23        TPro  7.4  /  Alb  4.1  /  TBili  0.3  /  DBili  x   /  AST  22  /  ALT  23  /  AlkPhos  44  10-11        PT/INR - ( 11 Oct 2019 15:23 )   PT: 12.2 sec;   INR: 1.09 ratio               PTT - ( 11 Oct 2019 15:23 )  PTT:30.7 sec        CARDIAC MARKERS ( 12 Oct 2019 08:16 )    <.015 ng/mL / x     / x     / x     / x        CARDIAC MARKERS ( 11 Oct 2019 23:46 )    <.015 ng/mL / x     / x     / x     / x        CARDIAC MARKERS ( 11 Oct 2019 15:23 )    <.015 ng/mL / x     / 139 U/L / x     / <1.0 ng/mL Patient is a 58y old  Female who presents with a chief complaint of chest pain (11 Oct 2019 19:42)        HPI:    59 y/o female with HTN. current tobacco user, Anxiety, and positive family history of CAD that presents to the ED for a 2 day history of worsening left chest wall, axillary, and left arm pain.  Patient states that her symptoms are intermittent, 5/10, worsened with exertion and not associated with nausea, vomiting, shortness of breath, or other symptom.  Patient states that she has had dry cough over the last 2-3 weeks.  Denies other symptoms.         ED course    EKG wnl    Trop wnl    chest pain is reproducible on palpation of left chest wall (11 Oct 2019 19:42)        SUBJECTIVE & OBJECTIVE: Pt seen and examined at bedside.     PHYSICAL EXAM:    ICU Vital Signs Last 24 Hrs    T(C): 37.1 (12 Oct 2019 11:09), Max: 37.1 (12 Oct 2019 11:09)    T(F): 98.7 (12 Oct 2019 11:09), Max: 98.7 (12 Oct 2019 11:09)    HR: 68 (12 Oct 2019 11:09) (60 - 68)    BP: 134/76 (12 Oct 2019 11:09) (101/69 - 163/90)    RR: 18 (12 Oct 2019 11:09) (18 - 20)    SpO2: 97% (12 Oct 2019 11:09) (96% - 98%)    Daily  Daily Weight in k.8 (12 Oct 2019 05:23)        GENERAL: NAD, well-groomed, well-developed    HEAD:  Atraumatic, Normocephalic    EYES: EOMI, PERRLA, conjunctiva and sclera clear    ENMT: Moist mucous membranes    NECK: Supple, No JVD    NERVOUS SYSTEM:  Alert & Oriented X3, Motor Strength 5/5 B/L upper and lower extremities; DTRs 2+ intact and symmetric    CHEST/LUNG: Clear to auscultation bilaterally; No rales, rhonchi, wheezing, or rubs    HEART: Regular rate and rhythm; No murmurs, rubs, or gallops    ABDOMEN: Soft, Nontender, Nondistended; Bowel sounds present    EXTREMITIES:  2+ Peripheral Pulses, No clubbing, cyanosis, or edema        MEDICATIONS  (STANDING):    amLODIPine   Tablet 5 milliGRAM(s) Oral daily    buDESOnide 160 MICROgram(s)/formoterol 4.5 MICROgram(s) Inhaler 2 Puff(s) Inhalation two times a day    hydrochlorothiazide 12.5 milliGRAM(s) Oral daily    influenza   Vaccine 0.5 milliLiter(s) IntraMuscular once    montelukast 10 milliGRAM(s) Oral daily    QUEtiapine 100 milliGRAM(s) Oral at bedtime    sertraline 25 milliGRAM(s) Oral daily    simvastatin 20 milliGRAM(s) Oral at bedtime        MEDICATIONS  (PRN):    ALBUTerol    90 MICROgram(s) HFA Inhaler 2 Puff(s) Inhalation four times a day PRN Bronchospasm    ALPRAZolam 0.5 milliGRAM(s) Oral at bedtime PRN anxiety    ibuprofen  Tablet. 400 milliGRAM(s) Oral every 6 hours PRN Mild Pain (1 - 3)            LABS:                            12.5     7.46  )-----------( 306      ( 11 Oct 2019 15:23 )               35.9         10-11        137  |  102  |  30<H>    ----------------------------<  100<H>    3.4<L>   |  27  |  1.05        Ca    9.2      11 Oct 2019 15:23        TPro  7.4  /  Alb  4.1  /  TBili  0.3  /  DBili  x   /  AST  22  /  ALT  23  /  AlkPhos  44  10-11        PT/INR - ( 11 Oct 2019 15:23 )   PT: 12.2 sec;   INR: 1.09 ratio               PTT - ( 11 Oct 2019 15:23 )  PTT:30.7 sec        CARDIAC MARKERS ( 12 Oct 2019 08:16 )    <.015 ng/mL / x     / x     / x     / x        CARDIAC MARKERS ( 11 Oct 2019 23:46 )    <.015 ng/mL / x     / x     / x     / x        CARDIAC MARKERS ( 11 Oct 2019 15:23 )    <.015 ng/mL / x     / 139 U/L / x     / <1.0 ng/mL        Patient hep C titers positive. No known history of hepatitis C. Discussed with Dr. Umanzor, patient is to follow up with GI DR. Monroy as soon as possible for further testing and treatment. Patient is a 58y old  Female who presents with a chief complaint of chest pain (11 Oct 2019 19:42)        HPI:    57 y/o female with HTN. current tobacco user, Anxiety, and positive family history of CAD that presents to the ED for a 2 day history of worsening left chest wall, axillary, and left arm pain.  Patient states that her symptoms are intermittent, 5/10, worsened with exertion and not associated with nausea, vomiting, shortness of breath, or other symptom.  Patient states that she has had dry cough over the last 2-3 weeks.  Denies other symptoms.         ED course    EKG wnl    Trop wnl    chest pain is reproducible on palpation of left chest wall (11 Oct 2019 19:42)        SUBJECTIVE & OBJECTIVE: Pt seen and examined at bedside.     PHYSICAL EXAM:    ICU Vital Signs Last 24 Hrs    T(C): 37.1 (12 Oct 2019 11:09), Max: 37.1 (12 Oct 2019 11:09)    T(F): 98.7 (12 Oct 2019 11:09), Max: 98.7 (12 Oct 2019 11:09)    HR: 68 (12 Oct 2019 11:09) (60 - 68)    BP: 134/76 (12 Oct 2019 11:09) (101/69 - 163/90)    RR: 18 (12 Oct 2019 11:09) (18 - 20)    SpO2: 97% (12 Oct 2019 11:09) (96% - 98%)    Daily  Daily Weight in k.8 (12 Oct 2019 05:23)        GENERAL: NAD, well-groomed, well-developed    HEAD:  Atraumatic, Normocephalic    EYES: EOMI, PERRLA, conjunctiva and sclera clear    ENMT: Moist mucous membranes    NECK: Supple, No JVD    NERVOUS SYSTEM:  Alert & Oriented X3, Motor Strength 5/5 B/L upper and lower extremities; DTRs 2+ intact and symmetric    CHEST/LUNG: Clear to auscultation bilaterally; No rales, rhonchi, wheezing, or rubs    HEART: Regular rate and rhythm; No murmurs, rubs, or gallops    ABDOMEN: Soft, Nontender, Nondistended; Bowel sounds present    EXTREMITIES:  2+ Peripheral Pulses, No clubbing, cyanosis, or edema        MEDICATIONS  (STANDING):    amLODIPine   Tablet 5 milliGRAM(s) Oral daily    buDESOnide 160 MICROgram(s)/formoterol 4.5 MICROgram(s) Inhaler 2 Puff(s) Inhalation two times a day    hydrochlorothiazide 12.5 milliGRAM(s) Oral daily    influenza   Vaccine 0.5 milliLiter(s) IntraMuscular once    montelukast 10 milliGRAM(s) Oral daily    QUEtiapine 100 milliGRAM(s) Oral at bedtime    sertraline 25 milliGRAM(s) Oral daily    simvastatin 20 milliGRAM(s) Oral at bedtime        MEDICATIONS  (PRN):    ALBUTerol    90 MICROgram(s) HFA Inhaler 2 Puff(s) Inhalation four times a day PRN Bronchospasm    ALPRAZolam 0.5 milliGRAM(s) Oral at bedtime PRN anxiety    ibuprofen  Tablet. 400 milliGRAM(s) Oral every 6 hours PRN Mild Pain (1 - 3)            LABS:                            12.5     7.46  )-----------( 306      ( 11 Oct 2019 15:23 )               35.9         10        137  |  102  |  30<H>    ----------------------------<  100<H>    3.4<L>   |  27  |  1.05        Ca    9.2      11 Oct 2019 15:23        TPro  7.4  /  Alb  4.1  /  TBili  0.3  /  DBili  x   /  AST  22  /  ALT  23  /  AlkPhos  44  1011        PT/INR - ( 11 Oct 2019 15:23 )   PT: 12.2 sec;   INR: 1.09 ratio               PTT - ( 11 Oct 2019 15:23 )  PTT:30.7 sec        CARDIAC MARKERS ( 12 Oct 2019 08:16 )    <.015 ng/mL / x     / x     / x     / x        CARDIAC MARKERS ( 11 Oct 2019 23:46 )    <.015 ng/mL / x     / x     / x     / x        CARDIAC MARKERS ( 11 Oct 2019 15:23 )    <.015 ng/mL / x     / 139 U/L / x     / <1.0 ng/mL        Patient hep C titers positive. Discussed with patient who admits she had Hepatitis C however was "cured" of it and patient follows up with liver specialist. Discussed with Dr. Umanzor, patient is to follow up with GI/liver specialist as soon as possible. Patient verbalized understanding and will make appointment with liver specialist. Information pamphlet from [facility] provided to patient as well. Patient is a 58y old  Female who presents with a chief complaint of chest pain (11 Oct 2019 19:42)        HPI:    57 y/o female with HTN. current tobacco user, Anxiety, and positive family history of CAD that presents to the ED for a 2 day history of worsening left chest wall, axillary, and left arm pain.  Patient states that her symptoms are intermittent, 5/10, worsened with exertion and not associated with nausea, vomiting, shortness of breath, or other symptom.  Patient states that she has had dry cough over the last 2-3 weeks.  Denies other symptoms.         ED course    EKG wnl    Trop wnl    chest pain is reproducible on palpation of left chest wall (11 Oct 2019 19:42)        SUBJECTIVE & OBJECTIVE: Pt seen and examined at bedside.     PHYSICAL EXAM:    ICU Vital Signs Last 24 Hrs    T(C): 37.1 (12 Oct 2019 11:09), Max: 37.1 (12 Oct 2019 11:09)    T(F): 98.7 (12 Oct 2019 11:09), Max: 98.7 (12 Oct 2019 11:09)    HR: 68 (12 Oct 2019 11:09) (60 - 68)    BP: 134/76 (12 Oct 2019 11:09) (101/69 - 163/90)    RR: 18 (12 Oct 2019 11:09) (18 - 20)    SpO2: 97% (12 Oct 2019 11:09) (96% - 98%)    Daily  Daily Weight in k.8 (12 Oct 2019 05:23)        GENERAL: NAD, well-groomed, well-developed    HEAD:  Atraumatic, Normocephalic    EYES: EOMI, PERRLA, conjunctiva and sclera clear    ENMT: Moist mucous membranes    NECK: Supple, No JVD    NERVOUS SYSTEM:  Alert & Oriented X3, Motor Strength 5/5 B/L upper and lower extremities; DTRs 2+ intact and symmetric    CHEST/LUNG: Clear to auscultation bilaterally; No rales, rhonchi, wheezing, or rubs    HEART: Regular rate and rhythm; No murmurs, rubs, or gallops    ABDOMEN: Soft, Nontender, Nondistended; Bowel sounds present    EXTREMITIES:  2+ Peripheral Pulses, No clubbing, cyanosis, or edema        MEDICATIONS  (STANDING):    amLODIPine   Tablet 5 milliGRAM(s) Oral daily    buDESOnide 160 MICROgram(s)/formoterol 4.5 MICROgram(s) Inhaler 2 Puff(s) Inhalation two times a day    hydrochlorothiazide 12.5 milliGRAM(s) Oral daily    influenza   Vaccine 0.5 milliLiter(s) IntraMuscular once    montelukast 10 milliGRAM(s) Oral daily    QUEtiapine 100 milliGRAM(s) Oral at bedtime    sertraline 25 milliGRAM(s) Oral daily    simvastatin 20 milliGRAM(s) Oral at bedtime        MEDICATIONS  (PRN):    ALBUTerol    90 MICROgram(s) HFA Inhaler 2 Puff(s) Inhalation four times a day PRN Bronchospasm    ALPRAZolam 0.5 milliGRAM(s) Oral at bedtime PRN anxiety    ibuprofen  Tablet. 400 milliGRAM(s) Oral every 6 hours PRN Mild Pain (1 - 3)            LABS:                            12.5     7.46  )-----------( 306      ( 11 Oct 2019 15:23 )               35.9         10-        137  |  102  |  30<H>    ----------------------------<  100<H>    3.4<L>   |  27  |  1.05        Ca    9.2      11 Oct 2019 15:23        TPro  7.4  /  Alb  4.1  /  TBili  0.3  /  DBili  x   /  AST  22  /  ALT  23  /  AlkPhos  44  10-11        PT/INR - ( 11 Oct 2019 15:23 )   PT: 12.2 sec;   INR: 1.09 ratio               PTT - ( 11 Oct 2019 15:23 )  PTT:30.7 sec        CARDIAC MARKERS ( 12 Oct 2019 08:16 )    <.015 ng/mL / x     / x     / x     / x        CARDIAC MARKERS ( 11 Oct 2019 23:46 )    <.015 ng/mL / x     / x     / x     / x        CARDIAC MARKERS ( 11 Oct 2019 15:23 )    <.015 ng/mL / x     / 139 U/L / x     / <1.0 ng/mL        Patient hep C titers positive. Discussed with patient who admits she had Hepatitis C however was "cured" of it and patient follows up with liver specialist. Discussed with Dr. Umanzor, patient is to follow up with GI/liver specialist (Dr. Federico Concepcion) as soon as possible. Patient verbalized understanding and will make appointment with liver specialist. Information pamphlet from [facility] provided to patient as well.

## 2019-10-12 NOTE — DISCHARGE NOTE NURSING/CASE MANAGEMENT/SOCIAL WORK - PATIENT PORTAL LINK FT
You can access the FollowMyHealth Patient Portal offered by [facility] by registering at the following website: [email or web address]. By joining [employer]’s FollowMyHealth portal, you will also be able to view your health information using other applications (apps) compatible with our system.

## 2019-10-12 NOTE — DISCHARGE NOTE PROVIDER - PROVIDER TOKENS
PROVIDER:[TOKEN:[[id number]]] PROVIDER:[TOKEN:[[id number]]],PROVIDER:[TOKEN:[[id number]]] PROVIDER:[TOKEN:[[id number]]],PROVIDER:[TOKEN:[[id number]]]

## 2019-10-12 NOTE — DISCHARGE NOTE PROVIDER - CARE PROVIDER_API CALL
Kit Payton)  Internal Medicine; Nuclear Cardiology  [address] [place] [address]  Phone: [phone]  Fax: [phone]  Follow Up Time: Kit Payton)  Internal Medicine; Nuclear Cardiology  [address] [place] [address]  Phone: [phone]  Fax: [phone]  Follow Up Time:     Tate Monroy)  Medicine  [address], [address]  [place] [address]  Phone: [phone]  Fax: [phone]  Follow Up Time: Kit Payton)  Internal Medicine; Nuclear Cardiology  [address] [place] [address]  Phone: [phone]  Fax: [phone]  Follow Up Time:     Christopher Concepcion)  Gastroenterology; Internal Medicine  [address] [place] [address]  Phone: [phone]  Fax: [phone]  Follow Up Time:

## 2019-10-14 ENCOUNTER — INBOUND DOCUMENT (OUTPATIENT)
Age: 59
End: 2019-10-14

## 2019-10-14 LAB
HCV RNA FLD QL NAA+PROBE: SIGNIFICANT CHANGE UP
HCV RNA SPEC QL PROBE+SIG AMP: SIGNIFICANT CHANGE UP

## 2019-10-17 DIAGNOSIS — I10 ESSENTIAL (PRIMARY) HYPERTENSION: ICD-10-CM

## 2019-10-17 DIAGNOSIS — J45.909 UNSPECIFIED ASTHMA, UNCOMPLICATED: ICD-10-CM

## 2019-10-17 DIAGNOSIS — E78.5 HYPERLIPIDEMIA, UNSPECIFIED: ICD-10-CM

## 2019-10-17 DIAGNOSIS — R07.9 CHEST PAIN, UNSPECIFIED: ICD-10-CM

## 2019-10-17 DIAGNOSIS — F41.9 ANXIETY DISORDER, UNSPECIFIED: ICD-10-CM

## 2019-10-17 DIAGNOSIS — M94.0 CHONDROCOSTAL JUNCTION SYNDROME [TIETZE]: ICD-10-CM

## 2019-10-17 DIAGNOSIS — F17.210 NICOTINE DEPENDENCE, CIGARETTES, UNCOMPLICATED: ICD-10-CM

## 2019-10-17 DIAGNOSIS — B19.20 UNSPECIFIED VIRAL HEPATITIS C WITHOUT HEPATIC COMA: ICD-10-CM

## 2019-10-24 ENCOUNTER — APPOINTMENT (OUTPATIENT)
Dept: HEPATOLOGY | Facility: CLINIC | Age: 59
End: 2019-10-24

## 2020-01-10 ENCOUNTER — APPOINTMENT (OUTPATIENT)
Dept: HEPATOLOGY | Facility: CLINIC | Age: 60
End: 2020-01-10

## 2020-02-05 ENCOUNTER — APPOINTMENT (OUTPATIENT)
Dept: HEPATOLOGY | Facility: CLINIC | Age: 60
End: 2020-02-05

## 2020-03-05 ENCOUNTER — APPOINTMENT (OUTPATIENT)
Dept: HEPATOLOGY | Facility: CLINIC | Age: 60
End: 2020-03-05

## 2020-07-14 ENCOUNTER — APPOINTMENT (OUTPATIENT)
Dept: HEPATOLOGY | Facility: CLINIC | Age: 60
End: 2020-07-14
Payer: MEDICAID

## 2020-07-14 VITALS
HEART RATE: 90 BPM | DIASTOLIC BLOOD PRESSURE: 90 MMHG | HEIGHT: 65 IN | SYSTOLIC BLOOD PRESSURE: 134 MMHG | RESPIRATION RATE: 14 BRPM | WEIGHT: 180 LBS | BODY MASS INDEX: 29.99 KG/M2

## 2020-07-14 DIAGNOSIS — B19.20 UNSPECIFIED VIRAL HEPATITIS C W/OUT HEPATIC COMA: ICD-10-CM

## 2020-07-14 PROCEDURE — 99214 OFFICE O/P EST MOD 30 MIN: CPT

## 2020-07-14 PROCEDURE — 91200 LIVER ELASTOGRAPHY: CPT

## 2020-07-14 NOTE — HISTORY OF PRESENT ILLNESS
[None] : ~he/she~ had no significant interval events [de-identified] : Ms. Napoles is a 58y/o female with hx of hepatitis C with SVR (completion of Harvoni 2015) returning to clinic for f/u. Denies s+s of decompensated liver disease (jaundice, HE, LE edema, etc.) Presenting today with no complaints felling well, a little down in her mood as Mother and Brother both passed this yr unrelated to covid. Denies N/V, melena, brbpr, chest palpations, fever/chills. \par \par Work up:\par 2/2016: BW revealed undetected HCV RNA. \par 5/2016: Ab US reported coarsened echotexture of the liver without focal mass, Smooth liver contour. Normal spleen and no ascites.  \par 03/2017: Ab US revealed liver WNL, no lesion or ascites. Fibroscan S1 steatosis and F1-F2 fibrosis. BW revealed LFT's, CBC, and AFP WNL, Hep C RNA not detected\par 02/2018: LFT's, CBC, and AFP WNL. Hep C RNA not detected\par 05/2018: Fibroscan revealed F1-F2 (7.6 kPa) and S0 ()\par 12/2019: LFT's WNL, Hep C RNA not detected\par 07/2020: Fibroscan revealed S3 () and F0-F1 (6.9 kPa). No recent BW on file.  \par \par ROS as below  [de-identified] : 2.5 yrs

## 2020-07-14 NOTE — REVIEW OF SYSTEMS
[Fever] : no fever [Chills] : no chills [Chest Pain] : no chest pain [Feeling Tired] : not feeling tired [Palpitations] : no palpitations [Shortness Of Breath] : no shortness of breath [Vomiting] : no vomiting [Cough] : no cough [Constipation] : no constipation [Dysuria] : no dysuria [Joint Pain] : no joint pain [Diarrhea] : no diarrhea [Joint Swelling] : no joint swelling [Skin Lesions] : no skin lesions [Itching] : no itching [Confused] : no confusion [Dizziness] : no dizziness [Fainting] : no fainting [FreeTextEntry7] : c/o of abdominal pain when stretching, explained pt my be overexerting self and to be cautious. F/U US ordered

## 2020-07-14 NOTE — ASSESSMENT
[FreeTextEntry1] : Ms. Napoles is a 60y/o female with hx of hepatitis C with SVR (completion of Harvoni 2015) returning to clinic for f/u. Denies s+s of decompensated liver disease (jaundice, HE, LE edema, etc.) Presenting today with no complaints felling well, a little down in her mood as Mother and Brother both passed this yr unrelated to covid. Denies N/V, melena, brbpr, chest palpations, fever/chills. She admits to social drinking, 8 times monthly and quit smoking a couple weeks ago.\par \par 1. Hep C with SVR/Fatty Liver (BMI 29, Dyslipidemia):\par 07/2020: Fibroscan revealed S3 () and F0-F1 (6.9 kPa). Ordered BW and US to be done asap and f/u call after one week of completion.   \par I have explained to her the natural history of  hepatitis C post treatment with SVR and fatty liver disease. I have explained the increase risk overtime of hepatocellular carcinoma and the need for Q6mn monitoring via BW and US. I have advised her that while she can not donate blood due to antibody always being present she can donate organs. I advised her that she can be re-infected with Hep C and discussed modes of transmissions via bodily fluids. I have advised her to lose 7-10 % of her weight, and to to maintain a healthy diet of lean meats, healthy fats and whole grains. I encouraged her to exercise at least 3x times daily. Encouraged her to avoid hepatotoxic agents, to abstain from alcohol and smoking usage completely. \par \par 2. Health maintenance: \par Colonoscopy 2019 with MD Izquierdo, pt reported no significant findings. She is scheduled to f/u in 12/2020 for repeat as she has family history. \par I have recommended hepatitis B vaccine as she is not immune. \par \par Plan: \par BW and US Asap and f/u in a week for results\par RTC Q6mns

## 2020-07-14 NOTE — PHYSICAL EXAM
[General Appearance - Well-Appearing] : healthy appearing [General Appearance - Alert] : alert [Sclera] : the sclera and conjunctiva were normal [Outer Ear] : the ears and nose were normal in appearance [Neck Appearance] : the appearance of the neck was normal [Jugular Venous Distention Increased] : there was no jugular-venous distention [Auscultation Breath Sounds / Voice Sounds] : lungs were clear to auscultation bilaterally [Heart Rate And Rhythm] : heart rate was normal and rhythm regular [Heart Sounds] : normal S1 and S2 [Bowel Sounds] : normal bowel sounds [Abdomen Soft] : soft [Abdomen Tenderness] : non-tender [No CVA Tenderness] : no ~M costovertebral angle tenderness [Abnormal Walk] : normal gait [Skin Color & Pigmentation] : normal skin color and pigmentation [] : no rash [Oriented To Time, Place, And Person] : oriented to person, place, and time [Affect] : the affect was normal [Scleral Icterus] : No Scleral Icterus [Spider Angioma] : No spider angioma(s) were observed [Abdominal  Ascites] : no ascites [Abdominal Bruit] : no abdominal bruit [Jaundice] : No jaundice [Palmar Erythema] : no Palmar Erythema

## 2020-07-15 LAB
AFP-TM SERPL-MCNC: 7.4 NG/ML
ALBUMIN SERPL ELPH-MCNC: 5.1 G/DL
ALP BLD-CCNC: 64 U/L
ALT SERPL-CCNC: 20 U/L
ANION GAP SERPL CALC-SCNC: 18 MMOL/L
AST SERPL-CCNC: 21 U/L
BASOPHILS # BLD AUTO: 0.04 K/UL
BASOPHILS NFR BLD AUTO: 0.8 %
BILIRUB SERPL-MCNC: 0.2 MG/DL
BUN SERPL-MCNC: 25 MG/DL
CALCIUM SERPL-MCNC: 10 MG/DL
CHLORIDE SERPL-SCNC: 101 MMOL/L
CO2 SERPL-SCNC: 22 MMOL/L
CREAT SERPL-MCNC: 0.94 MG/DL
EOSINOPHIL # BLD AUTO: 0.17 K/UL
EOSINOPHIL NFR BLD AUTO: 3.2 %
HCT VFR BLD CALC: 40 %
HGB BLD-MCNC: 12.9 G/DL
IMM GRANULOCYTES NFR BLD AUTO: 0.4 %
INR PPP: 1.03 RATIO
LYMPHOCYTES # BLD AUTO: 1.62 K/UL
LYMPHOCYTES NFR BLD AUTO: 30.7 %
MAN DIFF?: NORMAL
MCHC RBC-ENTMCNC: 29.7 PG
MCHC RBC-ENTMCNC: 32.3 GM/DL
MCV RBC AUTO: 92.2 FL
MONOCYTES # BLD AUTO: 0.44 K/UL
MONOCYTES NFR BLD AUTO: 8.3 %
NEUTROPHILS # BLD AUTO: 2.99 K/UL
NEUTROPHILS NFR BLD AUTO: 56.6 %
PLATELET # BLD AUTO: 257 K/UL
POTASSIUM SERPL-SCNC: 4.7 MMOL/L
PROT SERPL-MCNC: 7.3 G/DL
PT BLD: 12.1 SEC
RBC # BLD: 4.34 M/UL
RBC # FLD: 13.1 %
SODIUM SERPL-SCNC: 142 MMOL/L
WBC # FLD AUTO: 5.28 K/UL

## 2020-07-16 LAB
HCV RNA SERPL NAA DL=5-ACNC: NOT DETECTED IU/ML
HCV RNA SERPL NAA+PROBE-LOG IU: NOT DETECTED LOG10IU/ML

## 2020-09-21 ENCOUNTER — APPOINTMENT (OUTPATIENT)
Dept: ULTRASOUND IMAGING | Facility: CLINIC | Age: 60
End: 2020-09-21
Payer: MEDICAID

## 2020-09-21 ENCOUNTER — OUTPATIENT (OUTPATIENT)
Dept: OUTPATIENT SERVICES | Facility: HOSPITAL | Age: 60
LOS: 1 days | End: 2020-09-21
Payer: MEDICAID

## 2020-09-21 DIAGNOSIS — B18.2 CHRONIC VIRAL HEPATITIS C: ICD-10-CM

## 2020-09-21 PROCEDURE — 76700 US EXAM ABDOM COMPLETE: CPT

## 2020-09-21 PROCEDURE — 76700 US EXAM ABDOM COMPLETE: CPT | Mod: 26

## 2020-12-22 ENCOUNTER — TRANSCRIPTION ENCOUNTER (OUTPATIENT)
Age: 60
End: 2020-12-22

## 2021-01-14 ENCOUNTER — APPOINTMENT (OUTPATIENT)
Dept: HEPATOLOGY | Facility: CLINIC | Age: 61
End: 2021-01-14

## 2021-05-22 ENCOUNTER — APPOINTMENT (OUTPATIENT)
Dept: DISASTER EMERGENCY | Facility: OTHER | Age: 61
End: 2021-05-22
Payer: MEDICAID

## 2021-05-22 PROCEDURE — 0012A: CPT

## 2021-11-05 ENCOUNTER — RESULT REVIEW (OUTPATIENT)
Age: 61
End: 2021-11-05

## 2021-11-15 ENCOUNTER — APPOINTMENT (OUTPATIENT)
Dept: HEPATOLOGY | Facility: CLINIC | Age: 61
End: 2021-11-15
Payer: MEDICAID

## 2021-11-15 VITALS
RESPIRATION RATE: 14 BRPM | HEART RATE: 89 BPM | TEMPERATURE: 98.3 F | DIASTOLIC BLOOD PRESSURE: 89 MMHG | BODY MASS INDEX: 27.16 KG/M2 | HEIGHT: 65 IN | WEIGHT: 163 LBS | SYSTOLIC BLOOD PRESSURE: 141 MMHG

## 2021-11-15 DIAGNOSIS — K76.0 FATTY (CHANGE OF) LIVER, NOT ELSEWHERE CLASSIFIED: ICD-10-CM

## 2021-11-15 DIAGNOSIS — B18.2 CHRONIC VIRAL HEPATITIS C: ICD-10-CM

## 2021-11-15 LAB
AFP-TM SERPL-MCNC: 5.2 NG/ML
BASOPHILS # BLD AUTO: 0.04 K/UL
BASOPHILS NFR BLD AUTO: 0.6 %
EOSINOPHIL # BLD AUTO: 0.2 K/UL
EOSINOPHIL NFR BLD AUTO: 2.9 %
HCT VFR BLD CALC: 38.8 %
HGB BLD-MCNC: 12.9 G/DL
IMM GRANULOCYTES NFR BLD AUTO: 0.3 %
INR PPP: 1.04 RATIO
LYMPHOCYTES # BLD AUTO: 1.52 K/UL
LYMPHOCYTES NFR BLD AUTO: 21.7 %
MAN DIFF?: NORMAL
MCHC RBC-ENTMCNC: 28.9 PG
MCHC RBC-ENTMCNC: 33.2 GM/DL
MCV RBC AUTO: 87 FL
MONOCYTES # BLD AUTO: 0.45 K/UL
MONOCYTES NFR BLD AUTO: 6.4 %
NEUTROPHILS # BLD AUTO: 4.76 K/UL
NEUTROPHILS NFR BLD AUTO: 68.1 %
PLATELET # BLD AUTO: 304 K/UL
PT BLD: 12.2 SEC
RBC # BLD: 4.46 M/UL
RBC # FLD: 14.1 %
WBC # FLD AUTO: 6.99 K/UL

## 2021-11-15 PROCEDURE — 99214 OFFICE O/P EST MOD 30 MIN: CPT | Mod: 25

## 2021-11-15 PROCEDURE — 91200 LIVER ELASTOGRAPHY: CPT

## 2021-11-15 PROCEDURE — 90471 IMMUNIZATION ADMIN: CPT

## 2021-11-15 PROCEDURE — 90739 HEPB VACC 2/4 DOSE ADULT IM: CPT

## 2021-11-15 NOTE — PHYSICAL EXAM
[General Appearance - Alert] : alert [General Appearance - In No Acute Distress] : in no acute distress [General Appearance - Well Nourished] : well nourished [General Appearance - Well-Appearing] : healthy appearing [Sclera] : the sclera and conjunctiva were normal [Outer Ear] : the ears and nose were normal in appearance [Neck Appearance] : the appearance of the neck was normal [Jugular Venous Distention Increased] : there was no jugular-venous distention [Respiration, Rhythm And Depth] : normal respiratory rhythm and effort [Auscultation Breath Sounds / Voice Sounds] : lungs were clear to auscultation bilaterally [Heart Rate And Rhythm] : heart rate was normal and rhythm regular [Heart Sounds Gallop] : no gallops [Heart Sounds] : normal S1 and S2 [Murmurs] : no murmurs [Heart Sounds Pericardial Friction Rub] : no pericardial rub [Edema] : there was no peripheral edema [Bowel Sounds] : normal bowel sounds [Abdomen Soft] : soft [Abdomen Tenderness] : non-tender [No CVA Tenderness] : no ~M costovertebral angle tenderness [Abnormal Walk] : normal gait [] : no rash [Oriented To Time, Place, And Person] : oriented to person, place, and time [Impaired Insight] : insight and judgment were intact [Affect] : the affect was normal [Scleral Icterus] : No Scleral Icterus [Spider Angioma] : No spider angioma(s) were observed [Abdominal Bruit] : no abdominal bruit [Abdominal  Ascites] : no ascites [Jaundice] : No jaundice [FreeTextEntry1] : right shin flesh wound covered with butterfly clips

## 2021-11-15 NOTE — HISTORY OF PRESENT ILLNESS
[de-identified] : Ms. Napoles is a 62 y/o female with hx of hepatitis C with SVR (completion of Harvoni 2015) and fatty liver returning to clinic for f/u regarding. Presenting today with no liver complaints, has a flesh wound to R shin from a trip and fall over grand-daughters bike. Since last visit she has lost 17lbs intentionally via diet and increased activity. Denies N/V, melena, brbpr, chest palpations, fever/chills. \par \par Work up:\par 2/2016: BW revealed undetected HCV RNA. \par 5/2016: Ab US reported coarsened echotexture of the liver without focal mass, Smooth liver contour. Normal spleen and no ascites.  \par 03/2017: Ab US revealed liver WNL, no lesion or ascites. Fibroscan S1 steatosis and F1-F2 fibrosis. BW revealed LFT's, CBC, and AFP WNL, Hep C RNA not detected\par 02/2018: LFT's, CBC, and AFP WNL. Hep C RNA not detected\par 05/2018: Fibroscan revealed F1-F2 (7.6 kPa) and S0 ()\par 12/2019: LFT's WNL, Hep C RNA not detected\par 07/2020: Fibroscan revealed S3 () and F0-F1 (6.9 kPa). BW--LFTs and AFP WNL, HCV not detected \par \par -11/15/2021: Fibroscan --  (S1) and E 6.7 kPa (F0-1)\par \par ROS as below

## 2021-11-15 NOTE — ASSESSMENT
[FreeTextEntry1] : Ms. Napoles is a 60 y/o female with hx of hepatitis C with SVR (completion of Harvoni 2015) and fatty liver returning to clinic for f/u regarding.\par \par 1. Hep C with SVR/Fatty Liver (BMI 27, Dyslipidemia):\par - Fibroscan revealed a reduction from S3 to S1 and F1 to F0-1, with an intentional weight loss of 17lbs since last year.  \par -BW and US ASAP (pt stated she had completed US ab at [facility] radiology and will have a copy faxed to office for review).\par -I have explained to her the natural history of  hepatitis C post treatment with SVR and fatty liver disease. I have explained the increase risk overtime of hepatocellular carcinoma and the need for Q6mn monitoring via BW and US. I have advised her that while she can not donate blood due to antibody always being present she can donate organs. I advised her that she can be re-infected with Hep C and discussed modes of transmissions via bodily fluids. \par - I have explained the best treatment for fatty liver is diet and exercise. I have encouraged a gradual weight loss of at least 10%. \par -I also advised fatty liver may develop into liver cancer with/without cirrhosis and therefore continued screening with labs and ab imaging is important. I have encouraged a healthy lifestyle including exercising at least 3x times weekly and maintaining a diet low in carbohydrates, fat, sodium (<2gm daily) and cholesterol. \par -I have counseled pt on abstaining from alcohol and illicit drug use, avoid herbal and dietary supplements. Encouraged limit use of acetaminophen to <2gm per day and to avoid NSAIDS.    \par \par 2. HM: \par -COL: next in 12/2021, pt completes yearly due to hx of polyps and family hx of colon ca. with outside MD Izquierdo, \par - HBV not immune: begain HBV series today, pt to return in 1 month for 2nd inj. \par - Covid vaccination completed via Moderna 07/2021\par \par Plan: \par BW ASAP, US ab report to be faxed to office\par Continued weight loss via diet and exercise\par RTC 1 month for second HBv inj. \par RTC Q6mns with repeat BW and US ab

## 2021-11-15 NOTE — REVIEW OF SYSTEMS
[Skin Wound] : skin wound [Negative] : Heme/Lymph [Fever] : no fever [Chills] : no chills [Feeling Tired] : not feeling tired [Chest Pain] : no chest pain [Palpitations] : no palpitations [Shortness Of Breath] : no shortness of breath [Cough] : no cough [Vomiting] : no vomiting [Constipation] : no constipation [Diarrhea] : no diarrhea [Dysuria] : no dysuria [Joint Swelling] : no joint swelling [Skin Lesions] : no skin lesions [Itching] : no itching [Confused] : no confusion [Dizziness] : no dizziness [Fainting] : no fainting [de-identified] : Right shin flesh wound, no stitches just butterfly clips placed

## 2021-11-16 PROBLEM — K76.0 FATTY LIVER: Status: ACTIVE | Noted: 2020-07-14

## 2021-11-16 LAB
ALBUMIN SERPL ELPH-MCNC: 4.8 G/DL
ALP BLD-CCNC: 74 U/L
ALT SERPL-CCNC: 14 U/L
ANION GAP SERPL CALC-SCNC: 17 MMOL/L
AST SERPL-CCNC: 15 U/L
BILIRUB SERPL-MCNC: 0.3 MG/DL
BUN SERPL-MCNC: 21 MG/DL
CALCIUM SERPL-MCNC: 9.8 MG/DL
CHLORIDE SERPL-SCNC: 101 MMOL/L
CO2 SERPL-SCNC: 24 MMOL/L
CREAT SERPL-MCNC: 0.96 MG/DL
GLUCOSE SERPL-MCNC: 119 MG/DL
POTASSIUM SERPL-SCNC: 4.3 MMOL/L
PROT SERPL-MCNC: 7.2 G/DL
SODIUM SERPL-SCNC: 141 MMOL/L

## 2021-11-17 ENCOUNTER — NON-APPOINTMENT (OUTPATIENT)
Age: 61
End: 2021-11-17

## 2021-12-10 ENCOUNTER — EMERGENCY (EMERGENCY)
Facility: HOSPITAL | Age: 61
LOS: 0 days | Discharge: HOME | End: 2021-12-10
Attending: EMERGENCY MEDICINE
Payer: MEDICAID

## 2021-12-10 VITALS
HEART RATE: 73 BPM | OXYGEN SATURATION: 94 % | SYSTOLIC BLOOD PRESSURE: 147 MMHG | RESPIRATION RATE: 17 BRPM | DIASTOLIC BLOOD PRESSURE: 95 MMHG | TEMPERATURE: 98 F

## 2021-12-10 VITALS
RESPIRATION RATE: 18 BRPM | HEIGHT: 66 IN | HEART RATE: 84 BPM | WEIGHT: 162.04 LBS | OXYGEN SATURATION: 94 % | TEMPERATURE: 99 F | SYSTOLIC BLOOD PRESSURE: 121 MMHG | DIASTOLIC BLOOD PRESSURE: 85 MMHG

## 2021-12-10 DIAGNOSIS — R10.9 UNSPECIFIED ABDOMINAL PAIN: ICD-10-CM

## 2021-12-10 DIAGNOSIS — I10 ESSENTIAL (PRIMARY) HYPERTENSION: ICD-10-CM

## 2021-12-10 DIAGNOSIS — Z86.59 PERSONAL HISTORY OF OTHER MENTAL AND BEHAVIORAL DISORDERS: ICD-10-CM

## 2021-12-10 DIAGNOSIS — E78.5 HYPERLIPIDEMIA, UNSPECIFIED: ICD-10-CM

## 2021-12-10 DIAGNOSIS — J45.909 UNSPECIFIED ASTHMA, UNCOMPLICATED: ICD-10-CM

## 2021-12-10 DIAGNOSIS — K52.9 NONINFECTIVE GASTROENTERITIS AND COLITIS, UNSPECIFIED: ICD-10-CM

## 2021-12-10 DIAGNOSIS — R19.7 DIARRHEA, UNSPECIFIED: ICD-10-CM

## 2021-12-10 LAB
ALBUMIN SERPL ELPH-MCNC: 3.6 G/DL — SIGNIFICANT CHANGE UP (ref 3.3–5)
ALP SERPL-CCNC: 65 U/L — SIGNIFICANT CHANGE UP (ref 40–120)
ALT FLD-CCNC: 28 U/L — SIGNIFICANT CHANGE UP (ref 12–78)
ANION GAP SERPL CALC-SCNC: 9 MMOL/L — SIGNIFICANT CHANGE UP (ref 5–17)
APPEARANCE UR: CLEAR — SIGNIFICANT CHANGE UP
AST SERPL-CCNC: 28 U/L — SIGNIFICANT CHANGE UP (ref 15–37)
BACTERIA # UR AUTO: ABNORMAL
BASOPHILS # BLD AUTO: 0.04 K/UL — SIGNIFICANT CHANGE UP (ref 0–0.2)
BASOPHILS NFR BLD AUTO: 0.5 % — SIGNIFICANT CHANGE UP (ref 0–2)
BILIRUB SERPL-MCNC: 0.6 MG/DL — SIGNIFICANT CHANGE UP (ref 0.2–1.2)
BILIRUB UR-MCNC: NEGATIVE — SIGNIFICANT CHANGE UP
BUN SERPL-MCNC: 20 MG/DL — SIGNIFICANT CHANGE UP (ref 7–23)
CALCIUM SERPL-MCNC: 8.6 MG/DL — SIGNIFICANT CHANGE UP (ref 8.5–10.1)
CHLORIDE SERPL-SCNC: 103 MMOL/L — SIGNIFICANT CHANGE UP (ref 96–108)
CO2 SERPL-SCNC: 24 MMOL/L — SIGNIFICANT CHANGE UP (ref 22–31)
COLOR SPEC: YELLOW — SIGNIFICANT CHANGE UP
CREAT SERPL-MCNC: 0.95 MG/DL — SIGNIFICANT CHANGE UP (ref 0.5–1.3)
DIFF PNL FLD: NEGATIVE — SIGNIFICANT CHANGE UP
EOSINOPHIL # BLD AUTO: 0.09 K/UL — SIGNIFICANT CHANGE UP (ref 0–0.5)
EOSINOPHIL NFR BLD AUTO: 1.1 % — SIGNIFICANT CHANGE UP (ref 0–6)
EPI CELLS # UR: SIGNIFICANT CHANGE UP
GLUCOSE SERPL-MCNC: 93 MG/DL — SIGNIFICANT CHANGE UP (ref 70–99)
GLUCOSE UR QL: NEGATIVE MG/DL — SIGNIFICANT CHANGE UP
HCT VFR BLD CALC: 39.8 % — SIGNIFICANT CHANGE UP (ref 34.5–45)
HGB BLD-MCNC: 13.8 G/DL — SIGNIFICANT CHANGE UP (ref 11.5–15.5)
IMM GRANULOCYTES NFR BLD AUTO: 0.4 % — SIGNIFICANT CHANGE UP (ref 0–1.5)
KETONES UR-MCNC: NEGATIVE — SIGNIFICANT CHANGE UP
LEUKOCYTE ESTERASE UR-ACNC: ABNORMAL
LIDOCAIN IGE QN: 98 U/L — SIGNIFICANT CHANGE UP (ref 73–393)
LYMPHOCYTES # BLD AUTO: 2.14 K/UL — SIGNIFICANT CHANGE UP (ref 1–3.3)
LYMPHOCYTES # BLD AUTO: 27.2 % — SIGNIFICANT CHANGE UP (ref 13–44)
MCHC RBC-ENTMCNC: 28.6 PG — SIGNIFICANT CHANGE UP (ref 27–34)
MCHC RBC-ENTMCNC: 34.7 GM/DL — SIGNIFICANT CHANGE UP (ref 32–36)
MCV RBC AUTO: 82.4 FL — SIGNIFICANT CHANGE UP (ref 80–100)
MONOCYTES # BLD AUTO: 0.47 K/UL — SIGNIFICANT CHANGE UP (ref 0–0.9)
MONOCYTES NFR BLD AUTO: 6 % — SIGNIFICANT CHANGE UP (ref 2–14)
NEUTROPHILS # BLD AUTO: 5.1 K/UL — SIGNIFICANT CHANGE UP (ref 1.8–7.4)
NEUTROPHILS NFR BLD AUTO: 64.8 % — SIGNIFICANT CHANGE UP (ref 43–77)
NITRITE UR-MCNC: NEGATIVE — SIGNIFICANT CHANGE UP
NRBC # BLD: 0 /100 WBCS — SIGNIFICANT CHANGE UP (ref 0–0)
PH UR: 6.5 — SIGNIFICANT CHANGE UP (ref 5–8)
PLATELET # BLD AUTO: 326 K/UL — SIGNIFICANT CHANGE UP (ref 150–400)
POTASSIUM SERPL-MCNC: 3.5 MMOL/L — SIGNIFICANT CHANGE UP (ref 3.5–5.3)
POTASSIUM SERPL-SCNC: 3.5 MMOL/L — SIGNIFICANT CHANGE UP (ref 3.5–5.3)
PROT SERPL-MCNC: 7.1 GM/DL — SIGNIFICANT CHANGE UP (ref 6–8.3)
PROT UR-MCNC: NEGATIVE MG/DL — SIGNIFICANT CHANGE UP
RBC # BLD: 4.83 M/UL — SIGNIFICANT CHANGE UP (ref 3.8–5.2)
RBC # FLD: 13.8 % — SIGNIFICANT CHANGE UP (ref 10.3–14.5)
SODIUM SERPL-SCNC: 136 MMOL/L — SIGNIFICANT CHANGE UP (ref 135–145)
SP GR SPEC: 1.01 — SIGNIFICANT CHANGE UP (ref 1.01–1.02)
UROBILINOGEN FLD QL: NEGATIVE MG/DL — SIGNIFICANT CHANGE UP
WBC # BLD: 7.87 K/UL — SIGNIFICANT CHANGE UP (ref 3.8–10.5)
WBC # FLD AUTO: 7.87 K/UL — SIGNIFICANT CHANGE UP (ref 3.8–10.5)
WBC UR QL: SIGNIFICANT CHANGE UP

## 2021-12-10 PROCEDURE — 99285 EMERGENCY DEPT VISIT HI MDM: CPT

## 2021-12-10 PROCEDURE — 74177 CT ABD & PELVIS W/CONTRAST: CPT | Mod: 26,MA

## 2021-12-10 RX ORDER — CIPROFLOXACIN LACTATE 400MG/40ML
500 VIAL (ML) INTRAVENOUS ONCE
Refills: 0 | Status: COMPLETED | OUTPATIENT
Start: 2021-12-10 | End: 2021-12-10

## 2021-12-10 RX ORDER — IOHEXOL 300 MG/ML
30 INJECTION, SOLUTION INTRAVENOUS ONCE
Refills: 0 | Status: COMPLETED | OUTPATIENT
Start: 2021-12-10 | End: 2021-12-10

## 2021-12-10 RX ORDER — CIPROFLOXACIN LACTATE 400MG/40ML
1 VIAL (ML) INTRAVENOUS
Qty: 14 | Refills: 0
Start: 2021-12-10 | End: 2021-12-16

## 2021-12-10 RX ORDER — SODIUM CHLORIDE 9 MG/ML
1000 INJECTION INTRAMUSCULAR; INTRAVENOUS; SUBCUTANEOUS ONCE
Refills: 0 | Status: COMPLETED | OUTPATIENT
Start: 2021-12-10 | End: 2021-12-10

## 2021-12-10 RX ORDER — OXYCODONE AND ACETAMINOPHEN 5; 325 MG/1; MG/1
1 TABLET ORAL ONCE
Refills: 0 | Status: DISCONTINUED | OUTPATIENT
Start: 2021-12-10 | End: 2021-12-10

## 2021-12-10 RX ADMIN — Medication 500 MILLIGRAM(S): at 20:36

## 2021-12-10 RX ADMIN — OXYCODONE AND ACETAMINOPHEN 1 TABLET(S): 5; 325 TABLET ORAL at 18:58

## 2021-12-10 RX ADMIN — SODIUM CHLORIDE 1000 MILLILITER(S): 9 INJECTION INTRAMUSCULAR; INTRAVENOUS; SUBCUTANEOUS at 16:32

## 2021-12-10 RX ADMIN — IOHEXOL 30 MILLILITER(S): 300 INJECTION, SOLUTION INTRAVENOUS at 16:32

## 2021-12-10 RX ADMIN — OXYCODONE AND ACETAMINOPHEN 1 TABLET(S): 5; 325 TABLET ORAL at 18:42

## 2021-12-10 NOTE — ED ADULT NURSE NOTE - PRIMARY CARE PROVIDER
Breath sounds are clear, no distress present, no wheeze, rales, rhonchi or tachypnea. Normal rate and effort. Dr. Ace

## 2021-12-10 NOTE — ED PROVIDER NOTE - OBJECTIVE STATEMENT
61F with a history of asthma, anxiety, HLD, here with acute on chronic abdominal pain and diarrhea. She reports diffuse abdominal cramping, 7 episodes of loose stool daily x months. Has been evaluated by GI, on flagyl, has had ultrasound revealing "lipoma" but awaiting further imaging. She has colonoscope and endoscopy scheduled for January. Denies nausea or vomiting. Denies fever or chills. Reports history of breast reduction surgery. NO intraabdominal surgery.

## 2021-12-10 NOTE — SBIRT NOTE ADULT - NSSBIRTDRGBRIEFINTDET_GEN_A_CORE
Provided SBIRT services: Full screen positive. Pt reports daily marijuana use- last use last night. Brief Intervention Performed. Screening results were reviewed with the patient and patient was provided information about healthy guidelines and potential negative consequences associated with level of risk. Motivation and readiness to reduce or stop use was discussed and goals and activities to make changes were suggested/offered. Pt not willing to discuss possibility that marijuana could cause n/v in some patients. Stated it is legal, that her symptoms presently are unrelated, and that people use marijuana to relieve. HC explained that although it may help some patients, such as though on chemotherapy, for others it can cause n/v. Pt not receptive to conversation.  Provided SBIRT services: Full screen positive. Pt reports daily marijuana use- last use last night. Brief Intervention Performed. Screening results were reviewed with the patient and patient was provided information about healthy guidelines and potential negative consequences associated with level of risk. Motivation and readiness to reduce or stop use was discussed and goals and activities to make changes were suggested/offered. Pt not willing to discuss possibility that marijuana could cause n/v in some patients. Stated it is legal, that her symptoms presently are unrelated, and that people use marijuana to relieve nausea often. HC explained that although it may help some patients, such as though on chemotherapy, for others it can cause n/v. Pt not receptive to conversation. Stated abdominal pain does not get worse with use, though it doesn't offer relief.

## 2021-12-10 NOTE — ED PROVIDER NOTE - CLINICAL SUMMARY MEDICAL DECISION MAKING FREE TEXT BOX
Abdominal pain with diarrhea. GIven chronicity unlikely to have acute surgical abdomen, however has mild tenderness, recommend basic labs to evaluate for anemia, infection or electrolyte abnormality, CT abdomen and pelvis, fluids, patient declining analgesia

## 2021-12-10 NOTE — ED ADULT NURSE NOTE - OBJECTIVE STATEMENT
Pt A&Ox4, pt c/o abdominal pain, generalized, rated 9/10, sharp shooting constant pain. lower back pain, 10/10, aching, constant. dizziness, nausea, and  diarrhea x 2 months. pt seen by GI last week and has had multiple imaging tests done outpatient. Pt denies chest pain, sob, fever, cough, chills. hx: htn, anxiety. Pt states Family hx of colon and rectal cancer, mucus and slight blood in stool, hx of hemorrhoids.

## 2021-12-10 NOTE — ED PROVIDER NOTE - RESPIRATORY NEGATIVE STATEMENT, MLM
No pertinent past medical history <<----- Click to add NO pertinent Past Medical History no chest pain, no cough, and no shortness of breath.

## 2021-12-10 NOTE — ED ADULT NURSE NOTE - INTERVENTIONS DEFINITIONS
[place] to call system/Call bell, personal items and telephone within reach/Instruct patient to call for assistance

## 2021-12-10 NOTE — ED PROVIDER NOTE - PATIENT PORTAL LINK FT
You can access the FollowMyHealth Patient Portal offered by [facility] by registering at the following website: [email or web address]. By joining [employer]’s FollowMyHealth portal, you will also be able to view your health information using other applications (apps) compatible with our system.

## 2021-12-10 NOTE — ED PROVIDER NOTE - ATTENDING CONTRIBUTION TO CARE
Patient with chronic 2-3 month hx of intermittent abdominal pain, nausea, vomiting and diarrhea.  No sick contacts no recent travel.  Patient well appearing, no acute distress abdomen soft non-tender.  No electrolyte disturbance.  Colitis noted on CT scan.  She was given antibiotics and instructed to follow-up with GI.

## 2021-12-10 NOTE — ED ADULT TRIAGE NOTE - CHIEF COMPLAINT QUOTE
pt c/o abdominal pain, lower back pain, dizziness, nausea, and contestant diarrhea x 2 months. pt seen by GI last week. hx: htn, anxiety,

## 2021-12-10 NOTE — ED PROVIDER NOTE - PROGRESS NOTE DETAILS
Apparent mild mural   thickening at the mid ascending colon may be due to a nonspecific colitis   or colon cancer    patient taking flagyl, will add cipro and advise close GI follow-up

## 2021-12-10 NOTE — SBIRT NOTE ADULT - NSSBIRTALCPOSREINDET_GEN_A_CORE
Provided SBIRT services: Full screen Negative. Pt reports other than a girls weekend in October, she has abstained from drinking. Positive reinforcement provided given patient currently within healthy guidelines. Education materials reviewed and given to patient.

## 2021-12-17 ENCOUNTER — APPOINTMENT (OUTPATIENT)
Dept: HEPATOLOGY | Facility: CLINIC | Age: 61
End: 2021-12-17

## 2023-01-01 ENCOUNTER — INPATIENT (INPATIENT)
Facility: HOSPITAL | Age: 63
LOS: 3 days | Discharge: HOME | DRG: 327 | End: 2023-03-10
Attending: SURGERY | Admitting: SPECIALIST
Payer: COMMERCIAL

## 2023-01-01 ENCOUNTER — RESULT REVIEW (OUTPATIENT)
Age: 63
End: 2023-01-01

## 2023-01-01 ENCOUNTER — TRANSCRIPTION ENCOUNTER (OUTPATIENT)
Age: 63
End: 2023-01-01

## 2023-01-01 ENCOUNTER — INPATIENT (INPATIENT)
Facility: HOSPITAL | Age: 63
LOS: 7 days | End: 2023-12-08
Attending: INTERNAL MEDICINE | Admitting: INTERNAL MEDICINE
Payer: MEDICAID

## 2023-01-01 VITALS
TEMPERATURE: 98 F | RESPIRATION RATE: 18 BRPM | SYSTOLIC BLOOD PRESSURE: 86 MMHG | OXYGEN SATURATION: 98 % | DIASTOLIC BLOOD PRESSURE: 56 MMHG | HEART RATE: 104 BPM

## 2023-01-01 VITALS
RESPIRATION RATE: 20 BRPM | HEART RATE: 89 BPM | OXYGEN SATURATION: 92 % | TEMPERATURE: 97 F | SYSTOLIC BLOOD PRESSURE: 71 MMHG | DIASTOLIC BLOOD PRESSURE: 55 MMHG

## 2023-01-01 VITALS
DIASTOLIC BLOOD PRESSURE: 70 MMHG | SYSTOLIC BLOOD PRESSURE: 119 MMHG | HEART RATE: 886 BPM | TEMPERATURE: 98 F | OXYGEN SATURATION: 100 % | RESPIRATION RATE: 16 BRPM | HEIGHT: 66 IN | WEIGHT: 160.06 LBS

## 2023-01-01 VITALS
OXYGEN SATURATION: 96 % | SYSTOLIC BLOOD PRESSURE: 134 MMHG | DIASTOLIC BLOOD PRESSURE: 86 MMHG | HEART RATE: 92 BPM | TEMPERATURE: 98 F | RESPIRATION RATE: 18 BRPM

## 2023-01-01 DIAGNOSIS — Z87.891 PERSONAL HISTORY OF NICOTINE DEPENDENCE: ICD-10-CM

## 2023-01-01 DIAGNOSIS — R74.8 ABNORMAL LEVELS OF OTHER SERUM ENZYMES: ICD-10-CM

## 2023-01-01 DIAGNOSIS — R53.81 OTHER MALAISE: ICD-10-CM

## 2023-01-01 DIAGNOSIS — E43 UNSPECIFIED SEVERE PROTEIN-CALORIE MALNUTRITION: ICD-10-CM

## 2023-01-01 DIAGNOSIS — J44.9 CHRONIC OBSTRUCTIVE PULMONARY DISEASE, UNSPECIFIED: ICD-10-CM

## 2023-01-01 DIAGNOSIS — K20.90 ESOPHAGITIS, UNSPECIFIED WITHOUT BLEEDING: ICD-10-CM

## 2023-01-01 DIAGNOSIS — F41.9 ANXIETY DISORDER, UNSPECIFIED: ICD-10-CM

## 2023-01-01 DIAGNOSIS — I10 ESSENTIAL (PRIMARY) HYPERTENSION: ICD-10-CM

## 2023-01-01 DIAGNOSIS — C17.9 MALIGNANT NEOPLASM OF SMALL INTESTINE, UNSPECIFIED: ICD-10-CM

## 2023-01-01 DIAGNOSIS — E78.5 HYPERLIPIDEMIA, UNSPECIFIED: ICD-10-CM

## 2023-01-01 DIAGNOSIS — R79.89 OTHER SPECIFIED ABNORMAL FINDINGS OF BLOOD CHEMISTRY: ICD-10-CM

## 2023-01-01 DIAGNOSIS — E87.20 ACIDOSIS, UNSPECIFIED: ICD-10-CM

## 2023-01-01 DIAGNOSIS — C17.1 MALIGNANT NEOPLASM OF JEJUNUM: ICD-10-CM

## 2023-01-01 DIAGNOSIS — Z51.5 ENCOUNTER FOR PALLIATIVE CARE: ICD-10-CM

## 2023-01-01 DIAGNOSIS — C77.5 SECONDARY AND UNSPECIFIED MALIGNANT NEOPLASM OF INTRAPELVIC LYMPH NODES: ICD-10-CM

## 2023-01-01 DIAGNOSIS — C80.0 DISSEMINATED MALIGNANT NEOPLASM, UNSPECIFIED: ICD-10-CM

## 2023-01-01 DIAGNOSIS — E88.09 OTHER DISORDERS OF PLASMA-PROTEIN METABOLISM, NOT ELSEWHERE CLASSIFIED: ICD-10-CM

## 2023-01-01 DIAGNOSIS — K29.70 GASTRITIS, UNSPECIFIED, WITHOUT BLEEDING: ICD-10-CM

## 2023-01-01 DIAGNOSIS — C77.2 SECONDARY AND UNSPECIFIED MALIGNANT NEOPLASM OF INTRA-ABDOMINAL LYMPH NODES: ICD-10-CM

## 2023-01-01 DIAGNOSIS — Z29.9 ENCOUNTER FOR PROPHYLACTIC MEASURES, UNSPECIFIED: ICD-10-CM

## 2023-01-01 DIAGNOSIS — A41.9 SEPSIS, UNSPECIFIED ORGANISM: ICD-10-CM

## 2023-01-01 DIAGNOSIS — K31.1 ADULT HYPERTROPHIC PYLORIC STENOSIS: ICD-10-CM

## 2023-01-01 DIAGNOSIS — R74.01 ELEVATION OF LEVELS OF LIVER TRANSAMINASE LEVELS: ICD-10-CM

## 2023-01-01 DIAGNOSIS — C17.0 MALIGNANT NEOPLASM OF DUODENUM: ICD-10-CM

## 2023-01-01 DIAGNOSIS — F12.90 CANNABIS USE, UNSPECIFIED, UNCOMPLICATED: ICD-10-CM

## 2023-01-01 DIAGNOSIS — E87.1 HYPO-OSMOLALITY AND HYPONATREMIA: ICD-10-CM

## 2023-01-01 DIAGNOSIS — R18.8 OTHER ASCITES: ICD-10-CM

## 2023-01-01 DIAGNOSIS — K52.9 NONINFECTIVE GASTROENTERITIS AND COLITIS, UNSPECIFIED: ICD-10-CM

## 2023-01-01 DIAGNOSIS — C78.00 SECONDARY MALIGNANT NEOPLASM OF UNSPECIFIED LUNG: ICD-10-CM

## 2023-01-01 DIAGNOSIS — C78.7 SECONDARY MALIGNANT NEOPLASM OF LIVER AND INTRAHEPATIC BILE DUCT: ICD-10-CM

## 2023-01-01 DIAGNOSIS — Z87.09 PERSONAL HISTORY OF OTHER DISEASES OF THE RESPIRATORY SYSTEM: ICD-10-CM

## 2023-01-01 DIAGNOSIS — R10.9 UNSPECIFIED ABDOMINAL PAIN: ICD-10-CM

## 2023-01-01 DIAGNOSIS — N17.9 ACUTE KIDNEY FAILURE, UNSPECIFIED: ICD-10-CM

## 2023-01-01 DIAGNOSIS — Z87.19 PERSONAL HISTORY OF OTHER DISEASES OF THE DIGESTIVE SYSTEM: ICD-10-CM

## 2023-01-01 LAB
A1C WITH ESTIMATED AVERAGE GLUCOSE RESULT: 4.8 % — SIGNIFICANT CHANGE UP (ref 4–5.6)
ALBUMIN FLD-MCNC: 0.5 G/DL — SIGNIFICANT CHANGE UP
ALBUMIN FLD-MCNC: 0.5 G/DL — SIGNIFICANT CHANGE UP
ALBUMIN SERPL ELPH-MCNC: 2 G/DL — LOW (ref 3.3–5)
ALBUMIN SERPL ELPH-MCNC: 2 G/DL — LOW (ref 3.3–5)
ALBUMIN SERPL ELPH-MCNC: 2.3 G/DL — LOW (ref 3.3–5)
ALBUMIN SERPL ELPH-MCNC: 2.3 G/DL — LOW (ref 3.3–5)
ALBUMIN SERPL ELPH-MCNC: 3.9 G/DL — SIGNIFICANT CHANGE UP (ref 3.3–5)
ALBUMIN SERPL ELPH-MCNC: 4 G/DL — SIGNIFICANT CHANGE UP (ref 3.3–5)
ALBUMIN SERPL ELPH-MCNC: 4.1 G/DL — SIGNIFICANT CHANGE UP (ref 3.3–5)
ALBUMIN SERPL ELPH-MCNC: 4.2 G/DL — SIGNIFICANT CHANGE UP (ref 3.3–5)
ALP SERPL-CCNC: 324 U/L — HIGH (ref 40–120)
ALP SERPL-CCNC: 333 U/L — HIGH (ref 40–120)
ALP SERPL-CCNC: 353 U/L — HIGH (ref 40–120)
ALP SERPL-CCNC: 425 U/L — HIGH (ref 40–120)
ALP SERPL-CCNC: 525 U/L — HIGH (ref 40–120)
ALP SERPL-CCNC: 525 U/L — HIGH (ref 40–120)
ALP SERPL-CCNC: 599 U/L — HIGH (ref 40–120)
ALP SERPL-CCNC: 599 U/L — HIGH (ref 40–120)
ALT FLD-CCNC: 28 U/L — SIGNIFICANT CHANGE UP (ref 10–45)
ALT FLD-CCNC: 29 U/L — SIGNIFICANT CHANGE UP (ref 10–45)
ALT FLD-CCNC: 34 U/L — SIGNIFICANT CHANGE UP (ref 10–45)
ALT FLD-CCNC: 47 U/L — SIGNIFICANT CHANGE UP (ref 12–78)
ALT FLD-CCNC: 47 U/L — SIGNIFICANT CHANGE UP (ref 12–78)
ALT FLD-CCNC: 48 U/L — HIGH (ref 10–45)
ALT FLD-CCNC: 51 U/L — SIGNIFICANT CHANGE UP (ref 12–78)
ALT FLD-CCNC: 51 U/L — SIGNIFICANT CHANGE UP (ref 12–78)
ANION GAP SERPL CALC-SCNC: 12 MMOL/L — SIGNIFICANT CHANGE UP (ref 5–17)
ANION GAP SERPL CALC-SCNC: 13 MMOL/L — SIGNIFICANT CHANGE UP (ref 5–17)
ANION GAP SERPL CALC-SCNC: 14 MMOL/L — SIGNIFICANT CHANGE UP (ref 5–17)
ANION GAP SERPL CALC-SCNC: 15 MMOL/L — SIGNIFICANT CHANGE UP (ref 5–17)
ANION GAP SERPL CALC-SCNC: 16 MMOL/L — SIGNIFICANT CHANGE UP (ref 5–17)
ANION GAP SERPL CALC-SCNC: 17 MMOL/L — SIGNIFICANT CHANGE UP (ref 5–17)
ANION GAP SERPL CALC-SCNC: 17 MMOL/L — SIGNIFICANT CHANGE UP (ref 5–17)
APPEARANCE UR: CLEAR — SIGNIFICANT CHANGE UP
APPEARANCE UR: CLEAR — SIGNIFICANT CHANGE UP
APTT BLD: 30.3 SEC — SIGNIFICANT CHANGE UP (ref 27.5–35.5)
APTT BLD: 31.1 SEC — SIGNIFICANT CHANGE UP (ref 27.5–35.5)
AST SERPL-CCNC: 142 U/L — HIGH (ref 15–37)
AST SERPL-CCNC: 142 U/L — HIGH (ref 15–37)
AST SERPL-CCNC: 169 U/L — HIGH (ref 15–37)
AST SERPL-CCNC: 169 U/L — HIGH (ref 15–37)
AST SERPL-CCNC: 34 U/L — SIGNIFICANT CHANGE UP (ref 10–40)
AST SERPL-CCNC: 42 U/L — HIGH (ref 10–40)
AST SERPL-CCNC: 56 U/L — HIGH (ref 10–40)
B PERT IGG+IGM PNL SER: ABNORMAL
B PERT IGG+IGM PNL SER: ABNORMAL
BASOPHILS # BLD AUTO: 0.04 K/UL — SIGNIFICANT CHANGE UP (ref 0–0.2)
BASOPHILS # BLD AUTO: 0.06 K/UL — SIGNIFICANT CHANGE UP (ref 0–0.2)
BASOPHILS NFR BLD AUTO: 0.2 % — SIGNIFICANT CHANGE UP (ref 0–2)
BASOPHILS NFR BLD AUTO: 0.2 % — SIGNIFICANT CHANGE UP (ref 0–2)
BASOPHILS NFR BLD AUTO: 0.4 % — SIGNIFICANT CHANGE UP (ref 0–2)
BASOPHILS NFR BLD AUTO: 0.8 % — SIGNIFICANT CHANGE UP (ref 0–2)
BILIRUB DIRECT SERPL-MCNC: 0.5 MG/DL — HIGH (ref 0–0.3)
BILIRUB DIRECT SERPL-MCNC: 0.5 MG/DL — HIGH (ref 0–0.3)
BILIRUB INDIRECT FLD-MCNC: 0.6 MG/DL — SIGNIFICANT CHANGE UP (ref 0.2–1)
BILIRUB INDIRECT FLD-MCNC: 0.6 MG/DL — SIGNIFICANT CHANGE UP (ref 0.2–1)
BILIRUB SERPL-MCNC: 0.4 MG/DL — SIGNIFICANT CHANGE UP (ref 0.2–1.2)
BILIRUB SERPL-MCNC: 0.5 MG/DL — SIGNIFICANT CHANGE UP (ref 0.2–1.2)
BILIRUB SERPL-MCNC: 0.8 MG/DL — SIGNIFICANT CHANGE UP (ref 0.2–1.2)
BILIRUB SERPL-MCNC: 0.8 MG/DL — SIGNIFICANT CHANGE UP (ref 0.2–1.2)
BILIRUB SERPL-MCNC: 1.1 MG/DL — SIGNIFICANT CHANGE UP (ref 0.2–1.2)
BILIRUB SERPL-MCNC: 1.1 MG/DL — SIGNIFICANT CHANGE UP (ref 0.2–1.2)
BILIRUB SERPL-MCNC: 1.3 MG/DL — HIGH (ref 0.2–1.2)
BILIRUB SERPL-MCNC: 1.3 MG/DL — HIGH (ref 0.2–1.2)
BILIRUB UR-MCNC: NEGATIVE — SIGNIFICANT CHANGE UP
BILIRUB UR-MCNC: NEGATIVE — SIGNIFICANT CHANGE UP
BLD GP AB SCN SERPL QL: NEGATIVE — SIGNIFICANT CHANGE UP
BLD GP AB SCN SERPL QL: SIGNIFICANT CHANGE UP
BLD GP AB SCN SERPL QL: SIGNIFICANT CHANGE UP
BUN SERPL-MCNC: 10 MG/DL — SIGNIFICANT CHANGE UP (ref 7–23)
BUN SERPL-MCNC: 11 MG/DL — SIGNIFICANT CHANGE UP (ref 7–23)
BUN SERPL-MCNC: 13 MG/DL — SIGNIFICANT CHANGE UP (ref 7–23)
BUN SERPL-MCNC: 30 MG/DL — HIGH (ref 7–23)
BUN SERPL-MCNC: 70 MG/DL — HIGH (ref 7–23)
BUN SERPL-MCNC: 70 MG/DL — HIGH (ref 7–23)
BUN SERPL-MCNC: 73 MG/DL — HIGH (ref 7–23)
BUN SERPL-MCNC: 73 MG/DL — HIGH (ref 7–23)
BUN SERPL-MCNC: 76 MG/DL — HIGH (ref 7–23)
BUN SERPL-MCNC: 76 MG/DL — HIGH (ref 7–23)
BUN SERPL-MCNC: 9 MG/DL — SIGNIFICANT CHANGE UP (ref 7–23)
CALCIUM SERPL-MCNC: 10 MG/DL — SIGNIFICANT CHANGE UP (ref 8.4–10.5)
CALCIUM SERPL-MCNC: 7.8 MG/DL — LOW (ref 8.5–10.1)
CALCIUM SERPL-MCNC: 7.8 MG/DL — LOW (ref 8.5–10.1)
CALCIUM SERPL-MCNC: 8.4 MG/DL — LOW (ref 8.5–10.1)
CALCIUM SERPL-MCNC: 8.4 MG/DL — LOW (ref 8.5–10.1)
CALCIUM SERPL-MCNC: 8.7 MG/DL — SIGNIFICANT CHANGE UP (ref 8.5–10.1)
CALCIUM SERPL-MCNC: 8.7 MG/DL — SIGNIFICANT CHANGE UP (ref 8.5–10.1)
CALCIUM SERPL-MCNC: 9.4 MG/DL — SIGNIFICANT CHANGE UP (ref 8.4–10.5)
CALCIUM SERPL-MCNC: 9.8 MG/DL — SIGNIFICANT CHANGE UP (ref 8.4–10.5)
CALCIUM SERPL-MCNC: 9.9 MG/DL — SIGNIFICANT CHANGE UP (ref 8.4–10.5)
CHLORIDE SERPL-SCNC: 100 MMOL/L — SIGNIFICANT CHANGE UP (ref 96–108)
CHLORIDE SERPL-SCNC: 93 MMOL/L — LOW (ref 96–108)
CHLORIDE SERPL-SCNC: 94 MMOL/L — LOW (ref 96–108)
CHLORIDE SERPL-SCNC: 96 MMOL/L — SIGNIFICANT CHANGE UP (ref 96–108)
CHLORIDE SERPL-SCNC: 99 MMOL/L — SIGNIFICANT CHANGE UP (ref 96–108)
CO2 SERPL-SCNC: 15 MMOL/L — LOW (ref 22–31)
CO2 SERPL-SCNC: 15 MMOL/L — LOW (ref 22–31)
CO2 SERPL-SCNC: 19 MMOL/L — LOW (ref 22–31)
CO2 SERPL-SCNC: 19 MMOL/L — LOW (ref 22–31)
CO2 SERPL-SCNC: 21 MMOL/L — LOW (ref 22–31)
CO2 SERPL-SCNC: 21 MMOL/L — LOW (ref 22–31)
CO2 SERPL-SCNC: 23 MMOL/L — SIGNIFICANT CHANGE UP (ref 22–31)
CO2 SERPL-SCNC: 24 MMOL/L — SIGNIFICANT CHANGE UP (ref 22–31)
CO2 SERPL-SCNC: 26 MMOL/L — SIGNIFICANT CHANGE UP (ref 22–31)
CO2 SERPL-SCNC: 27 MMOL/L — SIGNIFICANT CHANGE UP (ref 22–31)
CO2 SERPL-SCNC: 28 MMOL/L — SIGNIFICANT CHANGE UP (ref 22–31)
COLOR FLD: YELLOW — SIGNIFICANT CHANGE UP
COLOR FLD: YELLOW — SIGNIFICANT CHANGE UP
COLOR SPEC: YELLOW — SIGNIFICANT CHANGE UP
COLOR SPEC: YELLOW — SIGNIFICANT CHANGE UP
CREAT SERPL-MCNC: 0.62 MG/DL — SIGNIFICANT CHANGE UP (ref 0.5–1.3)
CREAT SERPL-MCNC: 0.65 MG/DL — SIGNIFICANT CHANGE UP (ref 0.5–1.3)
CREAT SERPL-MCNC: 0.71 MG/DL — SIGNIFICANT CHANGE UP (ref 0.5–1.3)
CREAT SERPL-MCNC: 0.73 MG/DL — SIGNIFICANT CHANGE UP (ref 0.5–1.3)
CREAT SERPL-MCNC: 0.77 MG/DL — SIGNIFICANT CHANGE UP (ref 0.5–1.3)
CREAT SERPL-MCNC: 0.95 MG/DL — SIGNIFICANT CHANGE UP (ref 0.5–1.3)
CREAT SERPL-MCNC: 1.59 MG/DL — HIGH (ref 0.5–1.3)
CREAT SERPL-MCNC: 1.59 MG/DL — HIGH (ref 0.5–1.3)
CREAT SERPL-MCNC: 1.65 MG/DL — HIGH (ref 0.5–1.3)
CREAT SERPL-MCNC: 1.65 MG/DL — HIGH (ref 0.5–1.3)
CREAT SERPL-MCNC: 1.73 MG/DL — HIGH (ref 0.5–1.3)
CREAT SERPL-MCNC: 1.73 MG/DL — HIGH (ref 0.5–1.3)
CREAT SERPL-MCNC: 1.85 MG/DL — HIGH (ref 0.5–1.3)
CREAT SERPL-MCNC: 1.85 MG/DL — HIGH (ref 0.5–1.3)
CULTURE RESULTS: SIGNIFICANT CHANGE UP
DIFF PNL FLD: NEGATIVE — SIGNIFICANT CHANGE UP
DIFF PNL FLD: NEGATIVE — SIGNIFICANT CHANGE UP
EGFR: 100 ML/MIN/1.73M2 — SIGNIFICANT CHANGE UP
EGFR: 30 ML/MIN/1.73M2 — LOW
EGFR: 30 ML/MIN/1.73M2 — LOW
EGFR: 33 ML/MIN/1.73M2 — LOW
EGFR: 33 ML/MIN/1.73M2 — LOW
EGFR: 34 ML/MIN/1.73M2 — LOW
EGFR: 34 ML/MIN/1.73M2 — LOW
EGFR: 36 ML/MIN/1.73M2 — LOW
EGFR: 36 ML/MIN/1.73M2 — LOW
EGFR: 67 ML/MIN/1.73M2 — SIGNIFICANT CHANGE UP
EGFR: 87 ML/MIN/1.73M2 — SIGNIFICANT CHANGE UP
EGFR: 92 ML/MIN/1.73M2 — SIGNIFICANT CHANGE UP
EGFR: 95 ML/MIN/1.73M2 — SIGNIFICANT CHANGE UP
EGFR: 99 ML/MIN/1.73M2 — SIGNIFICANT CHANGE UP
EOSINOPHIL # BLD AUTO: 0.01 K/UL — SIGNIFICANT CHANGE UP (ref 0–0.5)
EOSINOPHIL # BLD AUTO: 0.01 K/UL — SIGNIFICANT CHANGE UP (ref 0–0.5)
EOSINOPHIL # BLD AUTO: 0.2 K/UL — SIGNIFICANT CHANGE UP (ref 0–0.5)
EOSINOPHIL # BLD AUTO: 0.27 K/UL — SIGNIFICANT CHANGE UP (ref 0–0.5)
EOSINOPHIL NFR BLD AUTO: 0.1 % — SIGNIFICANT CHANGE UP (ref 0–6)
EOSINOPHIL NFR BLD AUTO: 0.1 % — SIGNIFICANT CHANGE UP (ref 0–6)
EOSINOPHIL NFR BLD AUTO: 2.6 % — SIGNIFICANT CHANGE UP (ref 0–6)
EOSINOPHIL NFR BLD AUTO: 2.9 % — SIGNIFICANT CHANGE UP (ref 0–6)
ESTIMATED AVERAGE GLUCOSE: 91 MG/DL — SIGNIFICANT CHANGE UP (ref 68–114)
FLUID INTAKE SUBSTANCE CLASS: SIGNIFICANT CHANGE UP
FLUID INTAKE SUBSTANCE CLASS: SIGNIFICANT CHANGE UP
GLUCOSE BLDC GLUCOMTR-MCNC: 101 MG/DL — HIGH (ref 70–99)
GLUCOSE BLDC GLUCOMTR-MCNC: 101 MG/DL — HIGH (ref 70–99)
GLUCOSE FLD-MCNC: 99 MG/DL — SIGNIFICANT CHANGE UP
GLUCOSE FLD-MCNC: 99 MG/DL — SIGNIFICANT CHANGE UP
GLUCOSE SERPL-MCNC: 100 MG/DL — HIGH (ref 70–99)
GLUCOSE SERPL-MCNC: 102 MG/DL — HIGH (ref 70–99)
GLUCOSE SERPL-MCNC: 106 MG/DL — HIGH (ref 70–99)
GLUCOSE SERPL-MCNC: 106 MG/DL — HIGH (ref 70–99)
GLUCOSE SERPL-MCNC: 119 MG/DL — HIGH (ref 70–99)
GLUCOSE SERPL-MCNC: 119 MG/DL — HIGH (ref 70–99)
GLUCOSE SERPL-MCNC: 120 MG/DL — HIGH (ref 70–99)
GLUCOSE SERPL-MCNC: 147 MG/DL — HIGH (ref 70–99)
GLUCOSE SERPL-MCNC: 88 MG/DL — SIGNIFICANT CHANGE UP (ref 70–99)
GLUCOSE UR QL: NEGATIVE MG/DL — SIGNIFICANT CHANGE UP
GLUCOSE UR QL: NEGATIVE MG/DL — SIGNIFICANT CHANGE UP
GRAM STN FLD: SIGNIFICANT CHANGE UP
GRAM STN FLD: SIGNIFICANT CHANGE UP
HAV IGM SER-ACNC: SIGNIFICANT CHANGE UP
HAV IGM SER-ACNC: SIGNIFICANT CHANGE UP
HBV CORE IGM SER-ACNC: SIGNIFICANT CHANGE UP
HBV CORE IGM SER-ACNC: SIGNIFICANT CHANGE UP
HBV SURFACE AG SER-ACNC: SIGNIFICANT CHANGE UP
HBV SURFACE AG SER-ACNC: SIGNIFICANT CHANGE UP
HCG SERPL-ACNC: 4 MIU/ML — SIGNIFICANT CHANGE UP
HCT VFR BLD CALC: 31.9 % — LOW (ref 34.5–45)
HCT VFR BLD CALC: 31.9 % — LOW (ref 34.5–45)
HCT VFR BLD CALC: 32.8 % — LOW (ref 34.5–45)
HCT VFR BLD CALC: 32.8 % — LOW (ref 34.5–45)
HCT VFR BLD CALC: 33.8 % — LOW (ref 34.5–45)
HCT VFR BLD CALC: 34 % — LOW (ref 34.5–45)
HCT VFR BLD CALC: 34.9 % — SIGNIFICANT CHANGE UP (ref 34.5–45)
HCT VFR BLD CALC: 35.4 % — SIGNIFICANT CHANGE UP (ref 34.5–45)
HCT VFR BLD CALC: 35.5 % — SIGNIFICANT CHANGE UP (ref 34.5–45)
HCT VFR BLD CALC: 36.1 % — SIGNIFICANT CHANGE UP (ref 34.5–45)
HCT VFR BLD CALC: 36.1 % — SIGNIFICANT CHANGE UP (ref 34.5–45)
HCV AB S/CO SERPL IA: 12.62 S/CO — HIGH (ref 0–0.99)
HCV AB S/CO SERPL IA: 12.62 S/CO — HIGH (ref 0–0.99)
HCV AB S/CO SERPL IA: 125.5 S/CO — HIGH
HCV AB SERPL-IMP: REACTIVE
HCV RNA FLD QL NAA+PROBE: SIGNIFICANT CHANGE UP
HCV RNA SPEC NAA+PROBE-LOG IU: SIGNIFICANT CHANGE UP
HCV RNA SPEC NAA+PROBE-LOG IU: SIGNIFICANT CHANGE UP LOGIU/ML
HCV RNA SPEC QL PROBE+SIG AMP: SIGNIFICANT CHANGE UP
HGB BLD-MCNC: 11.1 G/DL — LOW (ref 11.5–15.5)
HGB BLD-MCNC: 11.2 G/DL — LOW (ref 11.5–15.5)
HGB BLD-MCNC: 11.3 G/DL — LOW (ref 11.5–15.5)
HGB BLD-MCNC: 11.4 G/DL — LOW (ref 11.5–15.5)
HGB BLD-MCNC: 11.4 G/DL — LOW (ref 11.5–15.5)
HGB BLD-MCNC: 11.6 G/DL — SIGNIFICANT CHANGE UP (ref 11.5–15.5)
HGB BLD-MCNC: 11.7 G/DL — SIGNIFICANT CHANGE UP (ref 11.5–15.5)
HGB BLD-MCNC: 12.4 G/DL — SIGNIFICANT CHANGE UP (ref 11.5–15.5)
HGB BLD-MCNC: 12.4 G/DL — SIGNIFICANT CHANGE UP (ref 11.5–15.5)
IMM GRANULOCYTES NFR BLD AUTO: 0.4 % — SIGNIFICANT CHANGE UP (ref 0–0.9)
IMM GRANULOCYTES NFR BLD AUTO: 0.9 % — SIGNIFICANT CHANGE UP (ref 0–0.9)
IMM GRANULOCYTES NFR BLD AUTO: 0.9 % — SIGNIFICANT CHANGE UP (ref 0–0.9)
INR BLD: 1.14 RATIO — SIGNIFICANT CHANGE UP (ref 0.85–1.18)
INR BLD: 1.14 RATIO — SIGNIFICANT CHANGE UP (ref 0.85–1.18)
INR BLD: 1.15 — SIGNIFICANT CHANGE UP (ref 0.88–1.16)
INR BLD: 1.17 — HIGH (ref 0.88–1.16)
KETONES UR-MCNC: NEGATIVE MG/DL — SIGNIFICANT CHANGE UP
KETONES UR-MCNC: NEGATIVE MG/DL — SIGNIFICANT CHANGE UP
LACTATE SERPL-SCNC: 1.7 MMOL/L — SIGNIFICANT CHANGE UP (ref 0.7–2)
LACTATE SERPL-SCNC: 1.7 MMOL/L — SIGNIFICANT CHANGE UP (ref 0.7–2)
LACTATE SERPL-SCNC: 2.1 MMOL/L — HIGH (ref 0.7–2)
LACTATE SERPL-SCNC: 2.1 MMOL/L — HIGH (ref 0.7–2)
LDH SERPL L TO P-CCNC: 120 U/L — SIGNIFICANT CHANGE UP
LDH SERPL L TO P-CCNC: 120 U/L — SIGNIFICANT CHANGE UP
LEUKOCYTE ESTERASE UR-ACNC: NEGATIVE — SIGNIFICANT CHANGE UP
LEUKOCYTE ESTERASE UR-ACNC: NEGATIVE — SIGNIFICANT CHANGE UP
LIDOCAIN IGE QN: 15 U/L — SIGNIFICANT CHANGE UP (ref 13–75)
LIDOCAIN IGE QN: 15 U/L — SIGNIFICANT CHANGE UP (ref 13–75)
LIDOCAIN IGE QN: 33 U/L — SIGNIFICANT CHANGE UP (ref 7–60)
LYMPHOCYTES # BLD AUTO: 1.08 K/UL — SIGNIFICANT CHANGE UP (ref 1–3.3)
LYMPHOCYTES # BLD AUTO: 1.16 K/UL — SIGNIFICANT CHANGE UP (ref 1–3.3)
LYMPHOCYTES # BLD AUTO: 1.16 K/UL — SIGNIFICANT CHANGE UP (ref 1–3.3)
LYMPHOCYTES # BLD AUTO: 1.23 K/UL — SIGNIFICANT CHANGE UP (ref 1–3.3)
LYMPHOCYTES # BLD AUTO: 11.6 % — LOW (ref 13–44)
LYMPHOCYTES # BLD AUTO: 16.2 % — SIGNIFICANT CHANGE UP (ref 13–44)
LYMPHOCYTES # BLD AUTO: 6.5 % — LOW (ref 13–44)
LYMPHOCYTES # BLD AUTO: 6.5 % — LOW (ref 13–44)
LYMPHOCYTES # FLD: 2 % — SIGNIFICANT CHANGE UP
LYMPHOCYTES # FLD: 2 % — SIGNIFICANT CHANGE UP
MAGNESIUM SERPL-MCNC: 1.6 MG/DL — SIGNIFICANT CHANGE UP (ref 1.6–2.6)
MAGNESIUM SERPL-MCNC: 1.7 MG/DL — SIGNIFICANT CHANGE UP (ref 1.6–2.6)
MAGNESIUM SERPL-MCNC: 1.8 MG/DL — SIGNIFICANT CHANGE UP (ref 1.6–2.6)
MAGNESIUM SERPL-MCNC: 1.9 MG/DL — SIGNIFICANT CHANGE UP (ref 1.6–2.6)
MAGNESIUM SERPL-MCNC: 2 MG/DL — SIGNIFICANT CHANGE UP (ref 1.6–2.6)
MCHC RBC-ENTMCNC: 28.7 PG — SIGNIFICANT CHANGE UP (ref 27–34)
MCHC RBC-ENTMCNC: 28.9 PG — SIGNIFICANT CHANGE UP (ref 27–34)
MCHC RBC-ENTMCNC: 29.4 PG — SIGNIFICANT CHANGE UP (ref 27–34)
MCHC RBC-ENTMCNC: 29.6 PG — SIGNIFICANT CHANGE UP (ref 27–34)
MCHC RBC-ENTMCNC: 29.7 PG — SIGNIFICANT CHANGE UP (ref 27–34)
MCHC RBC-ENTMCNC: 32.1 PG — SIGNIFICANT CHANGE UP (ref 27–34)
MCHC RBC-ENTMCNC: 32.1 PG — SIGNIFICANT CHANGE UP (ref 27–34)
MCHC RBC-ENTMCNC: 32.4 PG — SIGNIFICANT CHANGE UP (ref 27–34)
MCHC RBC-ENTMCNC: 32.6 GM/DL — SIGNIFICANT CHANGE UP (ref 32–36)
MCHC RBC-ENTMCNC: 33 GM/DL — SIGNIFICANT CHANGE UP (ref 32–36)
MCHC RBC-ENTMCNC: 33.1 GM/DL — SIGNIFICANT CHANGE UP (ref 32–36)
MCHC RBC-ENTMCNC: 33.2 GM/DL — SIGNIFICANT CHANGE UP (ref 32–36)
MCHC RBC-ENTMCNC: 34.3 G/DL — SIGNIFICANT CHANGE UP (ref 32–36)
MCHC RBC-ENTMCNC: 34.3 G/DL — SIGNIFICANT CHANGE UP (ref 32–36)
MCHC RBC-ENTMCNC: 34.8 G/DL — SIGNIFICANT CHANGE UP (ref 32–36)
MCHC RBC-ENTMCNC: 34.8 G/DL — SIGNIFICANT CHANGE UP (ref 32–36)
MCHC RBC-ENTMCNC: 35.1 G/DL — SIGNIFICANT CHANGE UP (ref 32–36)
MCHC RBC-ENTMCNC: 35.1 G/DL — SIGNIFICANT CHANGE UP (ref 32–36)
MCV RBC AUTO: 87.4 FL — SIGNIFICANT CHANGE UP (ref 80–100)
MCV RBC AUTO: 87.9 FL — SIGNIFICANT CHANGE UP (ref 80–100)
MCV RBC AUTO: 88.7 FL — SIGNIFICANT CHANGE UP (ref 80–100)
MCV RBC AUTO: 89.5 FL — SIGNIFICANT CHANGE UP (ref 80–100)
MCV RBC AUTO: 89.9 FL — SIGNIFICANT CHANGE UP (ref 80–100)
MCV RBC AUTO: 92.2 FL — SIGNIFICANT CHANGE UP (ref 80–100)
MCV RBC AUTO: 92.2 FL — SIGNIFICANT CHANGE UP (ref 80–100)
MCV RBC AUTO: 93.2 FL — SIGNIFICANT CHANGE UP (ref 80–100)
MCV RBC AUTO: 93.2 FL — SIGNIFICANT CHANGE UP (ref 80–100)
MCV RBC AUTO: 93.5 FL — SIGNIFICANT CHANGE UP (ref 80–100)
MCV RBC AUTO: 93.5 FL — SIGNIFICANT CHANGE UP (ref 80–100)
MELD SCORE WITH DIALYSIS: 26 POINTS — SIGNIFICANT CHANGE UP
MELD SCORE WITH DIALYSIS: 26 POINTS — SIGNIFICANT CHANGE UP
MELD SCORE WITHOUT DIALYSIS: 18 POINTS — SIGNIFICANT CHANGE UP
MELD SCORE WITHOUT DIALYSIS: 18 POINTS — SIGNIFICANT CHANGE UP
MESOTHL CELL # FLD: 2 % — SIGNIFICANT CHANGE UP
MESOTHL CELL # FLD: 2 % — SIGNIFICANT CHANGE UP
MONOCYTES # BLD AUTO: 0.42 K/UL — SIGNIFICANT CHANGE UP (ref 0–0.9)
MONOCYTES # BLD AUTO: 0.65 K/UL — SIGNIFICANT CHANGE UP (ref 0–0.9)
MONOCYTES # BLD AUTO: 1.12 K/UL — HIGH (ref 0–0.9)
MONOCYTES # BLD AUTO: 1.12 K/UL — HIGH (ref 0–0.9)
MONOCYTES NFR BLD AUTO: 5.5 % — SIGNIFICANT CHANGE UP (ref 2–14)
MONOCYTES NFR BLD AUTO: 6.2 % — SIGNIFICANT CHANGE UP (ref 2–14)
MONOCYTES NFR BLD AUTO: 6.2 % — SIGNIFICANT CHANGE UP (ref 2–14)
MONOCYTES NFR BLD AUTO: 7 % — SIGNIFICANT CHANGE UP (ref 2–14)
MONOS+MACROS # FLD: 67 % — SIGNIFICANT CHANGE UP
MONOS+MACROS # FLD: 67 % — SIGNIFICANT CHANGE UP
NEUTROPHILS # BLD AUTO: 15.45 K/UL — HIGH (ref 1.8–7.4)
NEUTROPHILS # BLD AUTO: 15.45 K/UL — HIGH (ref 1.8–7.4)
NEUTROPHILS # BLD AUTO: 5.67 K/UL — SIGNIFICANT CHANGE UP (ref 1.8–7.4)
NEUTROPHILS # BLD AUTO: 7.21 K/UL — SIGNIFICANT CHANGE UP (ref 1.8–7.4)
NEUTROPHILS NFR BLD AUTO: 74.5 % — SIGNIFICANT CHANGE UP (ref 43–77)
NEUTROPHILS NFR BLD AUTO: 77.7 % — HIGH (ref 43–77)
NEUTROPHILS NFR BLD AUTO: 86.1 % — HIGH (ref 43–77)
NEUTROPHILS NFR BLD AUTO: 86.1 % — HIGH (ref 43–77)
NEUTROPHILS-BODY FLUID: 29 % — SIGNIFICANT CHANGE UP
NEUTROPHILS-BODY FLUID: 29 % — SIGNIFICANT CHANGE UP
NITRITE UR-MCNC: NEGATIVE — SIGNIFICANT CHANGE UP
NITRITE UR-MCNC: NEGATIVE — SIGNIFICANT CHANGE UP
NRBC # BLD: 0 /100 WBCS — SIGNIFICANT CHANGE UP (ref 0–0)
PH UR: 5.5 — SIGNIFICANT CHANGE UP (ref 5–8)
PH UR: 5.5 — SIGNIFICANT CHANGE UP (ref 5–8)
PHOSPHATE SERPL-MCNC: 2 MG/DL — LOW (ref 2.5–4.5)
PHOSPHATE SERPL-MCNC: 2.7 MG/DL — SIGNIFICANT CHANGE UP (ref 2.5–4.5)
PHOSPHATE SERPL-MCNC: 3 MG/DL — SIGNIFICANT CHANGE UP (ref 2.5–4.5)
PHOSPHATE SERPL-MCNC: 3.2 MG/DL — SIGNIFICANT CHANGE UP (ref 2.5–4.5)
PHOSPHATE SERPL-MCNC: 3.8 MG/DL — SIGNIFICANT CHANGE UP (ref 2.5–4.5)
PLATELET # BLD AUTO: 100 K/UL — LOW (ref 150–400)
PLATELET # BLD AUTO: 100 K/UL — LOW (ref 150–400)
PLATELET # BLD AUTO: 105 K/UL — LOW (ref 150–400)
PLATELET # BLD AUTO: 105 K/UL — LOW (ref 150–400)
PLATELET # BLD AUTO: 107 K/UL — LOW (ref 150–400)
PLATELET # BLD AUTO: 107 K/UL — LOW (ref 150–400)
PLATELET # BLD AUTO: 398 K/UL — SIGNIFICANT CHANGE UP (ref 150–400)
PLATELET # BLD AUTO: 408 K/UL — HIGH (ref 150–400)
PLATELET # BLD AUTO: 415 K/UL — HIGH (ref 150–400)
PLATELET # BLD AUTO: 420 K/UL — HIGH (ref 150–400)
PLATELET # BLD AUTO: 431 K/UL — HIGH (ref 150–400)
PLATELET # BLD AUTO: 441 K/UL — HIGH (ref 150–400)
POTASSIUM SERPL-MCNC: 3.4 MMOL/L — LOW (ref 3.5–5.3)
POTASSIUM SERPL-MCNC: 3.6 MMOL/L — SIGNIFICANT CHANGE UP (ref 3.5–5.3)
POTASSIUM SERPL-MCNC: 4 MMOL/L — SIGNIFICANT CHANGE UP (ref 3.5–5.3)
POTASSIUM SERPL-MCNC: 4.1 MMOL/L — SIGNIFICANT CHANGE UP (ref 3.5–5.3)
POTASSIUM SERPL-MCNC: 4.3 MMOL/L — SIGNIFICANT CHANGE UP (ref 3.5–5.3)
POTASSIUM SERPL-MCNC: 4.4 MMOL/L — SIGNIFICANT CHANGE UP (ref 3.5–5.3)
POTASSIUM SERPL-MCNC: 4.5 MMOL/L — SIGNIFICANT CHANGE UP (ref 3.5–5.3)
POTASSIUM SERPL-MCNC: 4.5 MMOL/L — SIGNIFICANT CHANGE UP (ref 3.5–5.3)
POTASSIUM SERPL-SCNC: 3.4 MMOL/L — LOW (ref 3.5–5.3)
POTASSIUM SERPL-SCNC: 3.6 MMOL/L — SIGNIFICANT CHANGE UP (ref 3.5–5.3)
POTASSIUM SERPL-SCNC: 4 MMOL/L — SIGNIFICANT CHANGE UP (ref 3.5–5.3)
POTASSIUM SERPL-SCNC: 4.1 MMOL/L — SIGNIFICANT CHANGE UP (ref 3.5–5.3)
POTASSIUM SERPL-SCNC: 4.3 MMOL/L — SIGNIFICANT CHANGE UP (ref 3.5–5.3)
POTASSIUM SERPL-SCNC: 4.4 MMOL/L — SIGNIFICANT CHANGE UP (ref 3.5–5.3)
POTASSIUM SERPL-SCNC: 4.5 MMOL/L — SIGNIFICANT CHANGE UP (ref 3.5–5.3)
POTASSIUM SERPL-SCNC: 4.5 MMOL/L — SIGNIFICANT CHANGE UP (ref 3.5–5.3)
PROT FLD-MCNC: 1 G/DL — SIGNIFICANT CHANGE UP
PROT FLD-MCNC: 1 G/DL — SIGNIFICANT CHANGE UP
PROT SERPL-MCNC: 5.2 GM/DL — LOW (ref 6–8.3)
PROT SERPL-MCNC: 5.2 GM/DL — LOW (ref 6–8.3)
PROT SERPL-MCNC: 5.4 GM/DL — LOW (ref 6–8.3)
PROT SERPL-MCNC: 5.4 GM/DL — LOW (ref 6–8.3)
PROT SERPL-MCNC: 6.9 G/DL — SIGNIFICANT CHANGE UP (ref 6–8.3)
PROT SERPL-MCNC: 7.2 G/DL — SIGNIFICANT CHANGE UP (ref 6–8.3)
PROT SERPL-MCNC: 7.5 G/DL — SIGNIFICANT CHANGE UP (ref 6–8.3)
PROT UR-MCNC: NEGATIVE MG/DL — SIGNIFICANT CHANGE UP
PROT UR-MCNC: NEGATIVE MG/DL — SIGNIFICANT CHANGE UP
PROTHROM AB SERPL-ACNC: 13.5 SEC — HIGH (ref 9.5–13)
PROTHROM AB SERPL-ACNC: 13.5 SEC — HIGH (ref 9.5–13)
PROTHROM AB SERPL-ACNC: 13.7 SEC — HIGH (ref 10.5–13.4)
PROTHROM AB SERPL-ACNC: 14 SEC — HIGH (ref 10.5–13.4)
RBC # BLD: 3.46 M/UL — LOW (ref 3.8–5.2)
RBC # BLD: 3.46 M/UL — LOW (ref 3.8–5.2)
RBC # BLD: 3.52 M/UL — LOW (ref 3.8–5.2)
RBC # BLD: 3.52 M/UL — LOW (ref 3.8–5.2)
RBC # BLD: 3.8 M/UL — SIGNIFICANT CHANGE UP (ref 3.8–5.2)
RBC # BLD: 3.81 M/UL — SIGNIFICANT CHANGE UP (ref 3.8–5.2)
RBC # BLD: 3.86 M/UL — SIGNIFICANT CHANGE UP (ref 3.8–5.2)
RBC # BLD: 3.86 M/UL — SIGNIFICANT CHANGE UP (ref 3.8–5.2)
RBC # BLD: 3.87 M/UL — SIGNIFICANT CHANGE UP (ref 3.8–5.2)
RBC # BLD: 3.9 M/UL — SIGNIFICANT CHANGE UP (ref 3.8–5.2)
RBC # BLD: 3.95 M/UL — SIGNIFICANT CHANGE UP (ref 3.8–5.2)
RBC # BLD: 4.05 M/UL — SIGNIFICANT CHANGE UP (ref 3.8–5.2)
RBC # FLD: 14.3 % — SIGNIFICANT CHANGE UP (ref 10.3–14.5)
RBC # FLD: 14.6 % — HIGH (ref 10.3–14.5)
RBC # FLD: 15.3 % — HIGH (ref 10.3–14.5)
RBC # FLD: 15.4 % — HIGH (ref 10.3–14.5)
RBC # FLD: 18.3 % — HIGH (ref 10.3–14.5)
RBC # FLD: 18.3 % — HIGH (ref 10.3–14.5)
RBC # FLD: 18.7 % — HIGH (ref 10.3–14.5)
RBC # FLD: 18.7 % — HIGH (ref 10.3–14.5)
RBC # FLD: 19.1 % — HIGH (ref 10.3–14.5)
RBC # FLD: 19.1 % — HIGH (ref 10.3–14.5)
RCV VOL RI: 0 /UL — SIGNIFICANT CHANGE UP (ref 0–0)
RCV VOL RI: 0 /UL — SIGNIFICANT CHANGE UP (ref 0–0)
RH IG SCN BLD-IMP: POSITIVE — SIGNIFICANT CHANGE UP
SARS-COV-2 RNA SPEC QL NAA+PROBE: NEGATIVE — SIGNIFICANT CHANGE UP
SODIUM SERPL-SCNC: 129 MMOL/L — LOW (ref 135–145)
SODIUM SERPL-SCNC: 129 MMOL/L — LOW (ref 135–145)
SODIUM SERPL-SCNC: 131 MMOL/L — LOW (ref 135–145)
SODIUM SERPL-SCNC: 132 MMOL/L — LOW (ref 135–145)
SODIUM SERPL-SCNC: 135 MMOL/L — SIGNIFICANT CHANGE UP (ref 135–145)
SODIUM SERPL-SCNC: 136 MMOL/L — SIGNIFICANT CHANGE UP (ref 135–145)
SODIUM SERPL-SCNC: 138 MMOL/L — SIGNIFICANT CHANGE UP (ref 135–145)
SODIUM SERPL-SCNC: 140 MMOL/L — SIGNIFICANT CHANGE UP (ref 135–145)
SP GR SPEC: >1.03 — HIGH (ref 1–1.03)
SP GR SPEC: >1.03 — HIGH (ref 1–1.03)
SPECIMEN SOURCE: SIGNIFICANT CHANGE UP
SURGICAL PATHOLOGY STUDY: SIGNIFICANT CHANGE UP
TOTAL NUCLEATED CELL COUNT, BODY FLUID: 87 /UL — SIGNIFICANT CHANGE UP
TOTAL NUCLEATED CELL COUNT, BODY FLUID: 87 /UL — SIGNIFICANT CHANGE UP
TUBE TYPE: SIGNIFICANT CHANGE UP
TUBE TYPE: SIGNIFICANT CHANGE UP
UROBILINOGEN FLD QL: 1 MG/DL — SIGNIFICANT CHANGE UP (ref 0.2–1)
UROBILINOGEN FLD QL: 1 MG/DL — SIGNIFICANT CHANGE UP (ref 0.2–1)
WBC # BLD: 10.59 K/UL — HIGH (ref 3.8–10.5)
WBC # BLD: 17.95 K/UL — HIGH (ref 3.8–10.5)
WBC # BLD: 17.95 K/UL — HIGH (ref 3.8–10.5)
WBC # BLD: 21.3 K/UL — HIGH (ref 3.8–10.5)
WBC # BLD: 21.3 K/UL — HIGH (ref 3.8–10.5)
WBC # BLD: 26.4 K/UL — HIGH (ref 3.8–10.5)
WBC # BLD: 26.4 K/UL — HIGH (ref 3.8–10.5)
WBC # BLD: 7.61 K/UL — SIGNIFICANT CHANGE UP (ref 3.8–10.5)
WBC # BLD: 8.05 K/UL — SIGNIFICANT CHANGE UP (ref 3.8–10.5)
WBC # BLD: 8.85 K/UL — SIGNIFICANT CHANGE UP (ref 3.8–10.5)
WBC # BLD: 9.29 K/UL — SIGNIFICANT CHANGE UP (ref 3.8–10.5)
WBC # BLD: 9.39 K/UL — SIGNIFICANT CHANGE UP (ref 3.8–10.5)
WBC # FLD AUTO: 10.59 K/UL — HIGH (ref 3.8–10.5)
WBC # FLD AUTO: 17.95 K/UL — HIGH (ref 3.8–10.5)
WBC # FLD AUTO: 17.95 K/UL — HIGH (ref 3.8–10.5)
WBC # FLD AUTO: 21.3 K/UL — HIGH (ref 3.8–10.5)
WBC # FLD AUTO: 21.3 K/UL — HIGH (ref 3.8–10.5)
WBC # FLD AUTO: 26.4 K/UL — HIGH (ref 3.8–10.5)
WBC # FLD AUTO: 26.4 K/UL — HIGH (ref 3.8–10.5)
WBC # FLD AUTO: 7.61 K/UL — SIGNIFICANT CHANGE UP (ref 3.8–10.5)
WBC # FLD AUTO: 8.05 K/UL — SIGNIFICANT CHANGE UP (ref 3.8–10.5)
WBC # FLD AUTO: 8.85 K/UL — SIGNIFICANT CHANGE UP (ref 3.8–10.5)
WBC # FLD AUTO: 9.29 K/UL — SIGNIFICANT CHANGE UP (ref 3.8–10.5)
WBC # FLD AUTO: 9.39 K/UL — SIGNIFICANT CHANGE UP (ref 3.8–10.5)

## 2023-01-01 PROCEDURE — 76705 ECHO EXAM OF ABDOMEN: CPT | Mod: 26

## 2023-01-01 PROCEDURE — 85730 THROMBOPLASTIN TIME PARTIAL: CPT

## 2023-01-01 PROCEDURE — 88309 TISSUE EXAM BY PATHOLOGIST: CPT | Mod: 26

## 2023-01-01 PROCEDURE — 74177 CT ABD & PELVIS W/CONTRAST: CPT | Mod: 26,MC

## 2023-01-01 PROCEDURE — 49083 ABD PARACENTESIS W/IMAGING: CPT

## 2023-01-01 PROCEDURE — 86900 BLOOD TYPING SEROLOGIC ABO: CPT

## 2023-01-01 PROCEDURE — 71045 X-RAY EXAM CHEST 1 VIEW: CPT | Mod: 26

## 2023-01-01 PROCEDURE — 99497 ADVNCD CARE PLAN 30 MIN: CPT | Mod: 25

## 2023-01-01 PROCEDURE — 74240 X-RAY XM UPR GI TRC 1CNTRST: CPT | Mod: 26

## 2023-01-01 PROCEDURE — 71250 CT THORAX DX C-: CPT | Mod: 26,MC

## 2023-01-01 PROCEDURE — 93010 ELECTROCARDIOGRAM REPORT: CPT

## 2023-01-01 PROCEDURE — 88341 IMHCHEM/IMCYTCHM EA ADD ANTB: CPT | Mod: 26

## 2023-01-01 PROCEDURE — C1889: CPT

## 2023-01-01 PROCEDURE — 84702 CHORIONIC GONADOTROPIN TEST: CPT

## 2023-01-01 PROCEDURE — 74177 CT ABD & PELVIS W/CONTRAST: CPT | Mod: MC

## 2023-01-01 PROCEDURE — 87521 HEPATITIS C PROBE&RVRS TRNSC: CPT

## 2023-01-01 PROCEDURE — 99253 IP/OBS CNSLTJ NEW/EST LOW 45: CPT

## 2023-01-01 PROCEDURE — 88341 IMHCHEM/IMCYTCHM EA ADD ANTB: CPT

## 2023-01-01 PROCEDURE — 96376 TX/PRO/DX INJ SAME DRUG ADON: CPT

## 2023-01-01 PROCEDURE — 96375 TX/PRO/DX INJ NEW DRUG ADDON: CPT

## 2023-01-01 PROCEDURE — 99232 SBSQ HOSP IP/OBS MODERATE 35: CPT

## 2023-01-01 PROCEDURE — 88309 TISSUE EXAM BY PATHOLOGIST: CPT

## 2023-01-01 PROCEDURE — 71045 X-RAY EXAM CHEST 1 VIEW: CPT

## 2023-01-01 PROCEDURE — 99285 EMERGENCY DEPT VISIT HI MDM: CPT

## 2023-01-01 PROCEDURE — 99233 SBSQ HOSP IP/OBS HIGH 50: CPT

## 2023-01-01 PROCEDURE — 86901 BLOOD TYPING SEROLOGIC RH(D): CPT

## 2023-01-01 PROCEDURE — 87522 HEPATITIS C REVRS TRNSCRPJ: CPT

## 2023-01-01 PROCEDURE — 88112 CYTOPATH CELL ENHANCE TECH: CPT | Mod: 26

## 2023-01-01 PROCEDURE — 88305 TISSUE EXAM BY PATHOLOGIST: CPT | Mod: 26

## 2023-01-01 PROCEDURE — 80053 COMPREHEN METABOLIC PANEL: CPT

## 2023-01-01 PROCEDURE — 83036 HEMOGLOBIN GLYCOSYLATED A1C: CPT

## 2023-01-01 PROCEDURE — 86803 HEPATITIS C AB TEST: CPT

## 2023-01-01 PROCEDURE — 84100 ASSAY OF PHOSPHORUS: CPT

## 2023-01-01 PROCEDURE — 83690 ASSAY OF LIPASE: CPT

## 2023-01-01 PROCEDURE — 88342 IMHCHEM/IMCYTCHM 1ST ANTB: CPT | Mod: 26

## 2023-01-01 PROCEDURE — 36000 PLACE NEEDLE IN VEIN: CPT

## 2023-01-01 PROCEDURE — 99223 1ST HOSP IP/OBS HIGH 75: CPT

## 2023-01-01 PROCEDURE — 74240 X-RAY XM UPR GI TRC 1CNTRST: CPT

## 2023-01-01 PROCEDURE — 88305 TISSUE EXAM BY PATHOLOGIST: CPT

## 2023-01-01 PROCEDURE — 96374 THER/PROPH/DIAG INJ IV PUSH: CPT

## 2023-01-01 PROCEDURE — 87635 SARS-COV-2 COVID-19 AMP PRB: CPT

## 2023-01-01 PROCEDURE — 80048 BASIC METABOLIC PNL TOTAL CA: CPT

## 2023-01-01 PROCEDURE — 85027 COMPLETE CBC AUTOMATED: CPT

## 2023-01-01 PROCEDURE — 85610 PROTHROMBIN TIME: CPT

## 2023-01-01 PROCEDURE — 86850 RBC ANTIBODY SCREEN: CPT

## 2023-01-01 PROCEDURE — 99497 ADVNCD CARE PLAN 30 MIN: CPT

## 2023-01-01 PROCEDURE — 99239 HOSP IP/OBS DSCHRG MGMT >30: CPT

## 2023-01-01 PROCEDURE — 83735 ASSAY OF MAGNESIUM: CPT

## 2023-01-01 PROCEDURE — 85025 COMPLETE CBC W/AUTO DIFF WBC: CPT

## 2023-01-01 PROCEDURE — 36415 COLL VENOUS BLD VENIPUNCTURE: CPT

## 2023-01-01 PROCEDURE — 99285 EMERGENCY DEPT VISIT HI MDM: CPT | Mod: 25

## 2023-01-01 DEVICE — SURGICEL 4 X 8": Type: IMPLANTABLE DEVICE | Status: FUNCTIONAL

## 2023-01-01 DEVICE — VISTASEAL FIBRIN HUMAN 10ML: Type: IMPLANTABLE DEVICE | Status: FUNCTIONAL

## 2023-01-01 DEVICE — STAPLER COVIDIEN TRI-STAPLE 60MM PURPLE RELOAD: Type: IMPLANTABLE DEVICE | Status: FUNCTIONAL

## 2023-01-01 RX ORDER — HYDROMORPHONE HYDROCHLORIDE 2 MG/ML
0.25 INJECTION INTRAMUSCULAR; INTRAVENOUS; SUBCUTANEOUS ONCE
Refills: 0 | Status: DISCONTINUED | OUTPATIENT
Start: 2023-01-01 | End: 2023-01-01

## 2023-01-01 RX ORDER — LANOLIN ALCOHOL/MO/W.PET/CERES
3 CREAM (GRAM) TOPICAL AT BEDTIME
Refills: 0 | Status: DISCONTINUED | OUTPATIENT
Start: 2023-01-01 | End: 2023-01-01

## 2023-01-01 RX ORDER — MORPHINE SULFATE 50 MG/1
4 CAPSULE, EXTENDED RELEASE ORAL ONCE
Refills: 0 | Status: DISCONTINUED | OUTPATIENT
Start: 2023-01-01 | End: 2023-01-01

## 2023-01-01 RX ORDER — ROBINUL 0.2 MG/ML
0.4 INJECTION INTRAMUSCULAR; INTRAVENOUS EVERY 6 HOURS
Refills: 0 | Status: DISCONTINUED | OUTPATIENT
Start: 2023-01-01 | End: 2023-01-01

## 2023-01-01 RX ORDER — POTASSIUM CHLORIDE 20 MEQ
10 PACKET (EA) ORAL
Refills: 0 | Status: DISCONTINUED | OUTPATIENT
Start: 2023-01-01 | End: 2023-01-01

## 2023-01-01 RX ORDER — SODIUM CHLORIDE 9 MG/ML
1000 INJECTION, SOLUTION INTRAVENOUS
Refills: 0 | Status: DISCONTINUED | OUTPATIENT
Start: 2023-01-01 | End: 2023-01-01

## 2023-01-01 RX ORDER — KETOROLAC TROMETHAMINE 30 MG/ML
30 SYRINGE (ML) INJECTION ONCE
Refills: 0 | Status: DISCONTINUED | OUTPATIENT
Start: 2023-01-01 | End: 2023-01-01

## 2023-01-01 RX ORDER — ONDANSETRON 8 MG/1
4 TABLET, FILM COATED ORAL ONCE
Refills: 0 | Status: COMPLETED | OUTPATIENT
Start: 2023-01-01 | End: 2023-01-01

## 2023-01-01 RX ORDER — DIATRIZOATE MEGLUMINE 180 MG/ML
30 INJECTION, SOLUTION INTRAVESICAL ONCE
Refills: 0 | Status: COMPLETED | OUTPATIENT
Start: 2023-01-01 | End: 2023-01-01

## 2023-01-01 RX ORDER — SUCRALFATE 1 G
1 TABLET ORAL EVERY 12 HOURS
Refills: 0 | Status: DISCONTINUED | OUTPATIENT
Start: 2023-01-01 | End: 2023-01-01

## 2023-01-01 RX ORDER — DIPHENHYDRAMINE HCL 50 MG
50 CAPSULE ORAL ONCE
Refills: 0 | Status: COMPLETED | OUTPATIENT
Start: 2023-01-01 | End: 2023-01-01

## 2023-01-01 RX ORDER — MORPHINE SULFATE 50 MG/1
2 CAPSULE, EXTENDED RELEASE ORAL ONCE
Refills: 0 | Status: DISCONTINUED | OUTPATIENT
Start: 2023-01-01 | End: 2023-01-01

## 2023-01-01 RX ORDER — SERTRALINE 25 MG/1
1 TABLET, FILM COATED ORAL
Qty: 0 | Refills: 0 | DISCHARGE

## 2023-01-01 RX ORDER — HYDROMORPHONE HYDROCHLORIDE 2 MG/ML
2 INJECTION INTRAMUSCULAR; INTRAVENOUS; SUBCUTANEOUS
Refills: 0 | Status: DISCONTINUED | OUTPATIENT
Start: 2023-01-01 | End: 2023-01-01

## 2023-01-01 RX ORDER — HYDROMORPHONE HYDROCHLORIDE 2 MG/ML
0.5 INJECTION INTRAMUSCULAR; INTRAVENOUS; SUBCUTANEOUS ONCE
Refills: 0 | Status: DISCONTINUED | OUTPATIENT
Start: 2023-01-01 | End: 2023-01-01

## 2023-01-01 RX ORDER — MAGNESIUM SULFATE 500 MG/ML
1 VIAL (ML) INJECTION ONCE
Refills: 0 | Status: COMPLETED | OUTPATIENT
Start: 2023-01-01 | End: 2023-01-01

## 2023-01-01 RX ORDER — SODIUM,POTASSIUM PHOSPHATES 278-250MG
1 POWDER IN PACKET (EA) ORAL ONCE
Refills: 0 | Status: COMPLETED | OUTPATIENT
Start: 2023-01-01 | End: 2023-01-01

## 2023-01-01 RX ORDER — HYDROMORPHONE HYDROCHLORIDE 2 MG/ML
1 INJECTION INTRAMUSCULAR; INTRAVENOUS; SUBCUTANEOUS EVERY 4 HOURS
Refills: 0 | Status: DISCONTINUED | OUTPATIENT
Start: 2023-01-01 | End: 2023-01-01

## 2023-01-01 RX ORDER — BENZOCAINE AND MENTHOL 5; 1 G/100ML; G/100ML
1 LIQUID ORAL ONCE
Refills: 0 | Status: COMPLETED | OUTPATIENT
Start: 2023-01-01 | End: 2023-01-01

## 2023-01-01 RX ORDER — ALBUTEROL 90 UG/1
2 AEROSOL, METERED ORAL EVERY 6 HOURS
Refills: 0 | Status: DISCONTINUED | OUTPATIENT
Start: 2023-01-01 | End: 2023-01-01

## 2023-01-01 RX ORDER — MONTELUKAST 4 MG/1
10 TABLET, CHEWABLE ORAL DAILY
Refills: 0 | Status: DISCONTINUED | OUTPATIENT
Start: 2023-01-01 | End: 2023-01-01

## 2023-01-01 RX ORDER — PANTOPRAZOLE SODIUM 20 MG/1
40 TABLET, DELAYED RELEASE ORAL DAILY
Refills: 0 | Status: DISCONTINUED | OUTPATIENT
Start: 2023-01-01 | End: 2023-01-01

## 2023-01-01 RX ORDER — ACETAMINOPHEN 500 MG
650 TABLET ORAL EVERY 6 HOURS
Refills: 0 | Status: DISCONTINUED | OUTPATIENT
Start: 2023-01-01 | End: 2023-01-01

## 2023-01-01 RX ORDER — SODIUM CHLORIDE 9 MG/ML
250 INJECTION INTRAMUSCULAR; INTRAVENOUS; SUBCUTANEOUS ONCE
Refills: 0 | Status: COMPLETED | OUTPATIENT
Start: 2023-01-01 | End: 2023-01-01

## 2023-01-01 RX ORDER — POLYETHYLENE GLYCOL 3350 17 G/17G
17 POWDER, FOR SOLUTION ORAL
Refills: 0 | Status: DISCONTINUED | OUTPATIENT
Start: 2023-01-01 | End: 2023-01-01

## 2023-01-01 RX ORDER — SENNA PLUS 8.6 MG/1
2 TABLET ORAL AT BEDTIME
Refills: 0 | Status: DISCONTINUED | OUTPATIENT
Start: 2023-01-01 | End: 2023-01-01

## 2023-01-01 RX ORDER — MAGNESIUM SULFATE 500 MG/ML
2 VIAL (ML) INJECTION EVERY 6 HOURS
Refills: 0 | Status: COMPLETED | OUTPATIENT
Start: 2023-01-01 | End: 2023-01-01

## 2023-01-01 RX ORDER — METOCLOPRAMIDE HCL 10 MG
1 TABLET ORAL
Refills: 0 | DISCHARGE

## 2023-01-01 RX ORDER — SUCRALFATE 1 G
1 TABLET ORAL
Refills: 0 | Status: DISCONTINUED | OUTPATIENT
Start: 2023-01-01 | End: 2023-01-01

## 2023-01-01 RX ORDER — QUETIAPINE FUMARATE 200 MG/1
150 TABLET, FILM COATED ORAL AT BEDTIME
Refills: 0 | Status: DISCONTINUED | OUTPATIENT
Start: 2023-01-01 | End: 2023-01-01

## 2023-01-01 RX ORDER — SODIUM CHLORIDE 9 MG/ML
1000 INJECTION INTRAMUSCULAR; INTRAVENOUS; SUBCUTANEOUS ONCE
Refills: 0 | Status: COMPLETED | OUTPATIENT
Start: 2023-01-01 | End: 2023-01-01

## 2023-01-01 RX ORDER — MORPHINE SULFATE 50 MG/1
1 CAPSULE, EXTENDED RELEASE ORAL
Refills: 0 | DISCHARGE

## 2023-01-01 RX ORDER — QUETIAPINE FUMARATE 200 MG/1
50 TABLET, FILM COATED ORAL DAILY
Refills: 0 | Status: DISCONTINUED | OUTPATIENT
Start: 2023-01-01 | End: 2023-01-01

## 2023-01-01 RX ORDER — SERTRALINE 25 MG/1
1 TABLET, FILM COATED ORAL
Refills: 0 | DISCHARGE

## 2023-01-01 RX ORDER — ALPRAZOLAM 0.25 MG
0.5 TABLET ORAL THREE TIMES A DAY
Refills: 0 | Status: DISCONTINUED | OUTPATIENT
Start: 2023-01-01 | End: 2023-01-01

## 2023-01-01 RX ORDER — ALBUMIN HUMAN 25 %
50 VIAL (ML) INTRAVENOUS
Refills: 0 | Status: COMPLETED | OUTPATIENT
Start: 2023-01-01 | End: 2023-01-01

## 2023-01-01 RX ORDER — ALBUMIN HUMAN 25 %
50 VIAL (ML) INTRAVENOUS EVERY 6 HOURS
Refills: 0 | Status: COMPLETED | OUTPATIENT
Start: 2023-01-01 | End: 2023-01-01

## 2023-01-01 RX ORDER — POTASSIUM CHLORIDE 20 MEQ
20 PACKET (EA) ORAL
Refills: 0 | Status: COMPLETED | OUTPATIENT
Start: 2023-01-01 | End: 2023-01-01

## 2023-01-01 RX ORDER — SODIUM CHLORIDE 9 MG/ML
1000 INJECTION INTRAMUSCULAR; INTRAVENOUS; SUBCUTANEOUS
Refills: 0 | Status: DISCONTINUED | OUTPATIENT
Start: 2023-01-01 | End: 2023-01-01

## 2023-01-01 RX ORDER — ALBUTEROL 90 UG/1
2 AEROSOL, METERED ORAL
Qty: 0 | Refills: 0 | DISCHARGE

## 2023-01-01 RX ORDER — QUETIAPINE FUMARATE 200 MG/1
100 TABLET, FILM COATED ORAL AT BEDTIME
Refills: 0 | Status: DISCONTINUED | OUTPATIENT
Start: 2023-01-01 | End: 2023-01-01

## 2023-01-01 RX ORDER — ACETAMINOPHEN 500 MG
1000 TABLET ORAL ONCE
Refills: 0 | Status: COMPLETED | OUTPATIENT
Start: 2023-01-01 | End: 2023-01-01

## 2023-01-01 RX ORDER — OXYCODONE HYDROCHLORIDE 5 MG/1
2.5 TABLET ORAL ONCE
Refills: 0 | Status: DISCONTINUED | OUTPATIENT
Start: 2023-01-01 | End: 2023-01-01

## 2023-01-01 RX ORDER — FENTANYL CITRATE 50 UG/ML
1 INJECTION INTRAVENOUS
Refills: 0 | Status: DISCONTINUED | OUTPATIENT
Start: 2023-01-01 | End: 2023-01-01

## 2023-01-01 RX ORDER — HEPARIN SODIUM 5000 [USP'U]/ML
5000 INJECTION INTRAVENOUS; SUBCUTANEOUS EVERY 8 HOURS
Refills: 0 | Status: DISCONTINUED | OUTPATIENT
Start: 2023-01-01 | End: 2023-01-01

## 2023-01-01 RX ORDER — AMLODIPINE BESYLATE 2.5 MG/1
10 TABLET ORAL DAILY
Refills: 0 | Status: DISCONTINUED | OUTPATIENT
Start: 2023-01-01 | End: 2023-01-01

## 2023-01-01 RX ORDER — LISINOPRIL 2.5 MG/1
40 TABLET ORAL EVERY 24 HOURS
Refills: 0 | Status: DISCONTINUED | OUTPATIENT
Start: 2023-01-01 | End: 2023-01-01

## 2023-01-01 RX ORDER — HYDROMORPHONE HYDROCHLORIDE 2 MG/ML
2 INJECTION INTRAMUSCULAR; INTRAVENOUS; SUBCUTANEOUS EVERY 4 HOURS
Refills: 0 | Status: DISCONTINUED | OUTPATIENT
Start: 2023-01-01 | End: 2023-01-01

## 2023-01-01 RX ORDER — CHLORHEXIDINE GLUCONATE 213 G/1000ML
1 SOLUTION TOPICAL DAILY
Refills: 0 | Status: DISCONTINUED | OUTPATIENT
Start: 2023-01-01 | End: 2023-01-01

## 2023-01-01 RX ORDER — SERTRALINE 25 MG/1
50 TABLET, FILM COATED ORAL DAILY
Refills: 0 | Status: DISCONTINUED | OUTPATIENT
Start: 2023-01-01 | End: 2023-01-01

## 2023-01-01 RX ORDER — BUDESONIDE AND FORMOTEROL FUMARATE DIHYDRATE 160; 4.5 UG/1; UG/1
2 AEROSOL RESPIRATORY (INHALATION)
Qty: 0 | Refills: 0 | DISCHARGE

## 2023-01-01 RX ORDER — PANTOPRAZOLE SODIUM 20 MG/1
1 TABLET, DELAYED RELEASE ORAL
Refills: 0 | DISCHARGE

## 2023-01-01 RX ORDER — HYDROMORPHONE HYDROCHLORIDE 2 MG/ML
4 INJECTION INTRAMUSCULAR; INTRAVENOUS; SUBCUTANEOUS EVERY 6 HOURS
Refills: 0 | Status: DISCONTINUED | OUTPATIENT
Start: 2023-01-01 | End: 2023-01-01

## 2023-01-01 RX ORDER — PANTOPRAZOLE SODIUM 20 MG/1
40 TABLET, DELAYED RELEASE ORAL
Refills: 0 | Status: DISCONTINUED | OUTPATIENT
Start: 2023-01-01 | End: 2023-01-01

## 2023-01-01 RX ORDER — HYDROMORPHONE HYDROCHLORIDE 2 MG/ML
1 INJECTION INTRAMUSCULAR; INTRAVENOUS; SUBCUTANEOUS
Refills: 0 | DISCHARGE

## 2023-01-01 RX ORDER — HYDROCHLOROTHIAZIDE 25 MG
1 TABLET ORAL
Refills: 0 | DISCHARGE

## 2023-01-01 RX ORDER — ONDANSETRON 8 MG/1
4 TABLET, FILM COATED ORAL EVERY 8 HOURS
Refills: 0 | Status: DISCONTINUED | OUTPATIENT
Start: 2023-01-01 | End: 2023-01-01

## 2023-01-01 RX ORDER — ONDANSETRON 8 MG/1
4 TABLET, FILM COATED ORAL EVERY 6 HOURS
Refills: 0 | Status: COMPLETED | OUTPATIENT
Start: 2023-01-01 | End: 2023-01-01

## 2023-01-01 RX ORDER — MEGESTROL ACETATE 40 MG/ML
10 SUSPENSION ORAL
Refills: 0 | DISCHARGE

## 2023-01-01 RX ORDER — SIMVASTATIN 20 MG/1
1 TABLET, FILM COATED ORAL
Qty: 0 | Refills: 0 | DISCHARGE

## 2023-01-01 RX ORDER — ONDANSETRON 8 MG/1
4 TABLET, FILM COATED ORAL EVERY 6 HOURS
Refills: 0 | Status: DISCONTINUED | OUTPATIENT
Start: 2023-01-01 | End: 2023-01-01

## 2023-01-01 RX ORDER — QUETIAPINE FUMARATE 200 MG/1
1 TABLET, FILM COATED ORAL
Refills: 0 | DISCHARGE

## 2023-01-01 RX ORDER — POTASSIUM CHLORIDE 20 MEQ
40 PACKET (EA) ORAL ONCE
Refills: 0 | Status: DISCONTINUED | OUTPATIENT
Start: 2023-01-01 | End: 2023-01-01

## 2023-01-01 RX ORDER — PANTOPRAZOLE SODIUM 20 MG/1
40 TABLET, DELAYED RELEASE ORAL ONCE
Refills: 0 | Status: COMPLETED | OUTPATIENT
Start: 2023-01-01 | End: 2023-01-01

## 2023-01-01 RX ORDER — AMLODIPINE BESYLATE 2.5 MG/1
1 TABLET ORAL
Qty: 0 | Refills: 0 | DISCHARGE

## 2023-01-01 RX ORDER — KETOROLAC TROMETHAMINE 30 MG/ML
15 SYRINGE (ML) INJECTION EVERY 6 HOURS
Refills: 0 | Status: DISCONTINUED | OUTPATIENT
Start: 2023-01-01 | End: 2023-01-01

## 2023-01-01 RX ORDER — SUCRALFATE 1 G
1 TABLET ORAL
Refills: 0 | DISCHARGE

## 2023-01-01 RX ORDER — AMLODIPINE BESYLATE AND BENAZEPRIL HYDROCHLORIDE 10; 20 MG/1; MG/1
1 CAPSULE ORAL
Refills: 0 | DISCHARGE

## 2023-01-01 RX ORDER — HEPARIN SODIUM 5000 [USP'U]/ML
5000 INJECTION INTRAVENOUS; SUBCUTANEOUS EVERY 12 HOURS
Refills: 0 | Status: DISCONTINUED | OUTPATIENT
Start: 2023-01-01 | End: 2023-01-01

## 2023-01-01 RX ORDER — HYDROMORPHONE HYDROCHLORIDE 2 MG/ML
0.5 INJECTION INTRAMUSCULAR; INTRAVENOUS; SUBCUTANEOUS
Qty: 100 | Refills: 0 | Status: DISCONTINUED | OUTPATIENT
Start: 2023-01-01 | End: 2023-01-01

## 2023-01-01 RX ORDER — SIMETHICONE 80 MG/1
80 TABLET, CHEWABLE ORAL
Refills: 0 | Status: DISCONTINUED | OUTPATIENT
Start: 2023-01-01 | End: 2023-01-01

## 2023-01-01 RX ORDER — OXYCODONE HYDROCHLORIDE 5 MG/1
5 TABLET ORAL ONCE
Refills: 0 | Status: DISCONTINUED | OUTPATIENT
Start: 2023-01-01 | End: 2023-01-01

## 2023-01-01 RX ORDER — ZINC SULFATE TAB 220 MG (50 MG ZINC EQUIVALENT) 220 (50 ZN) MG
220 TAB ORAL DAILY
Refills: 0 | Status: DISCONTINUED | OUTPATIENT
Start: 2023-01-01 | End: 2023-01-01

## 2023-01-01 RX ORDER — SENNA PLUS 8.6 MG/1
1 TABLET ORAL DAILY
Refills: 0 | Status: DISCONTINUED | OUTPATIENT
Start: 2023-01-01 | End: 2023-01-01

## 2023-01-01 RX ORDER — QUETIAPINE FUMARATE 200 MG/1
50 TABLET, FILM COATED ORAL AT BEDTIME
Refills: 0 | Status: DISCONTINUED | OUTPATIENT
Start: 2023-01-01 | End: 2023-01-01

## 2023-01-01 RX ORDER — HYDROCHLOROTHIAZIDE 25 MG
1 TABLET ORAL
Qty: 0 | Refills: 0 | DISCHARGE

## 2023-01-01 RX ADMIN — Medication 50 MILLIEQUIVALENT(S): at 16:24

## 2023-01-01 RX ADMIN — Medication 3 MILLIGRAM(S): at 21:06

## 2023-01-01 RX ADMIN — HYDROMORPHONE HYDROCHLORIDE 1 MILLIGRAM(S): 2 INJECTION INTRAMUSCULAR; INTRAVENOUS; SUBCUTANEOUS at 01:33

## 2023-01-01 RX ADMIN — HYDROMORPHONE HYDROCHLORIDE 2 MILLIGRAM(S): 2 INJECTION INTRAMUSCULAR; INTRAVENOUS; SUBCUTANEOUS at 08:21

## 2023-01-01 RX ADMIN — SERTRALINE 50 MILLIGRAM(S): 25 TABLET, FILM COATED ORAL at 18:41

## 2023-01-01 RX ADMIN — HYDROMORPHONE HYDROCHLORIDE 1 MILLIGRAM(S): 2 INJECTION INTRAMUSCULAR; INTRAVENOUS; SUBCUTANEOUS at 17:31

## 2023-01-01 RX ADMIN — BENZOCAINE AND MENTHOL 1 LOZENGE: 5; 1 LIQUID ORAL at 11:54

## 2023-01-01 RX ADMIN — SODIUM CHLORIDE 60 MILLILITER(S): 9 INJECTION INTRAMUSCULAR; INTRAVENOUS; SUBCUTANEOUS at 11:26

## 2023-01-01 RX ADMIN — HEPARIN SODIUM 5000 UNIT(S): 5000 INJECTION INTRAVENOUS; SUBCUTANEOUS at 18:14

## 2023-01-01 RX ADMIN — AMLODIPINE BESYLATE 10 MILLIGRAM(S): 2.5 TABLET ORAL at 06:03

## 2023-01-01 RX ADMIN — Medication 0.5 MILLIGRAM(S): at 21:14

## 2023-01-01 RX ADMIN — Medication 50 MILLILITER(S): at 11:51

## 2023-01-01 RX ADMIN — Medication 15 MILLIGRAM(S): at 11:02

## 2023-01-01 RX ADMIN — QUETIAPINE FUMARATE 50 MILLIGRAM(S): 200 TABLET, FILM COATED ORAL at 22:01

## 2023-01-01 RX ADMIN — Medication 0.5 MILLIGRAM(S): at 22:40

## 2023-01-01 RX ADMIN — SODIUM CHLORIDE 1000 MILLILITER(S): 9 INJECTION INTRAMUSCULAR; INTRAVENOUS; SUBCUTANEOUS at 15:52

## 2023-01-01 RX ADMIN — HYDROMORPHONE HYDROCHLORIDE 0.25 MILLIGRAM(S): 2 INJECTION INTRAMUSCULAR; INTRAVENOUS; SUBCUTANEOUS at 17:00

## 2023-01-01 RX ADMIN — MONTELUKAST 10 MILLIGRAM(S): 4 TABLET, CHEWABLE ORAL at 12:37

## 2023-01-01 RX ADMIN — HYDROMORPHONE HYDROCHLORIDE 2 MILLIGRAM(S): 2 INJECTION INTRAMUSCULAR; INTRAVENOUS; SUBCUTANEOUS at 23:20

## 2023-01-01 RX ADMIN — HYDROMORPHONE HYDROCHLORIDE 4 MILLIGRAM(S): 2 INJECTION INTRAMUSCULAR; INTRAVENOUS; SUBCUTANEOUS at 22:31

## 2023-01-01 RX ADMIN — SERTRALINE 50 MILLIGRAM(S): 25 TABLET, FILM COATED ORAL at 12:37

## 2023-01-01 RX ADMIN — Medication 50 MILLILITER(S): at 06:19

## 2023-01-01 RX ADMIN — PANTOPRAZOLE SODIUM 40 MILLIGRAM(S): 20 TABLET, DELAYED RELEASE ORAL at 12:55

## 2023-01-01 RX ADMIN — SODIUM CHLORIDE 1500 MILLILITER(S): 9 INJECTION INTRAMUSCULAR; INTRAVENOUS; SUBCUTANEOUS at 01:33

## 2023-01-01 RX ADMIN — HEPARIN SODIUM 5000 UNIT(S): 5000 INJECTION INTRAVENOUS; SUBCUTANEOUS at 17:34

## 2023-01-01 RX ADMIN — SENNA PLUS 2 TABLET(S): 8.6 TABLET ORAL at 21:54

## 2023-01-01 RX ADMIN — FENTANYL CITRATE 1 PATCH: 50 INJECTION INTRAVENOUS at 07:02

## 2023-01-01 RX ADMIN — Medication 0.5 MILLIGRAM(S): at 22:55

## 2023-01-01 RX ADMIN — HYDROMORPHONE HYDROCHLORIDE 4 MILLIGRAM(S): 2 INJECTION INTRAMUSCULAR; INTRAVENOUS; SUBCUTANEOUS at 05:45

## 2023-01-01 RX ADMIN — SENNA PLUS 2 TABLET(S): 8.6 TABLET ORAL at 22:46

## 2023-01-01 RX ADMIN — Medication 1 GRAM(S): at 17:35

## 2023-01-01 RX ADMIN — Medication 15 MILLIGRAM(S): at 02:56

## 2023-01-01 RX ADMIN — HYDROMORPHONE HYDROCHLORIDE 4 MILLIGRAM(S): 2 INJECTION INTRAMUSCULAR; INTRAVENOUS; SUBCUTANEOUS at 07:38

## 2023-01-01 RX ADMIN — HYDROMORPHONE HYDROCHLORIDE 0.5 MILLIGRAM(S): 2 INJECTION INTRAMUSCULAR; INTRAVENOUS; SUBCUTANEOUS at 22:09

## 2023-01-01 RX ADMIN — Medication 3 MILLIGRAM(S): at 03:21

## 2023-01-01 RX ADMIN — Medication 100 MILLILITER(S): at 12:16

## 2023-01-01 RX ADMIN — HYDROMORPHONE HYDROCHLORIDE 2 MILLIGRAM(S): 2 INJECTION INTRAMUSCULAR; INTRAVENOUS; SUBCUTANEOUS at 16:50

## 2023-01-01 RX ADMIN — Medication 15 MILLIGRAM(S): at 03:51

## 2023-01-01 RX ADMIN — HYDROMORPHONE HYDROCHLORIDE 1 MILLIGRAM(S): 2 INJECTION INTRAMUSCULAR; INTRAVENOUS; SUBCUTANEOUS at 14:13

## 2023-01-01 RX ADMIN — Medication 3 MILLIGRAM(S): at 21:54

## 2023-01-01 RX ADMIN — QUETIAPINE FUMARATE 150 MILLIGRAM(S): 200 TABLET, FILM COATED ORAL at 22:21

## 2023-01-01 RX ADMIN — HYDROMORPHONE HYDROCHLORIDE 2 MILLIGRAM(S): 2 INJECTION INTRAMUSCULAR; INTRAVENOUS; SUBCUTANEOUS at 09:10

## 2023-01-01 RX ADMIN — PANTOPRAZOLE SODIUM 40 MILLIGRAM(S): 20 TABLET, DELAYED RELEASE ORAL at 06:03

## 2023-01-01 RX ADMIN — PANTOPRAZOLE SODIUM 40 MILLIGRAM(S): 20 TABLET, DELAYED RELEASE ORAL at 06:19

## 2023-01-01 RX ADMIN — MONTELUKAST 10 MILLIGRAM(S): 4 TABLET, CHEWABLE ORAL at 12:08

## 2023-01-01 RX ADMIN — Medication 0.5 MILLIGRAM(S): at 19:15

## 2023-01-01 RX ADMIN — SERTRALINE 50 MILLIGRAM(S): 25 TABLET, FILM COATED ORAL at 11:49

## 2023-01-01 RX ADMIN — Medication 1 GRAM(S): at 05:58

## 2023-01-01 RX ADMIN — SODIUM CHLORIDE 45 MILLILITER(S): 9 INJECTION INTRAMUSCULAR; INTRAVENOUS; SUBCUTANEOUS at 07:43

## 2023-01-01 RX ADMIN — SERTRALINE 50 MILLIGRAM(S): 25 TABLET, FILM COATED ORAL at 12:08

## 2023-01-01 RX ADMIN — PANTOPRAZOLE SODIUM 40 MILLIGRAM(S): 20 TABLET, DELAYED RELEASE ORAL at 11:37

## 2023-01-01 RX ADMIN — HYDROMORPHONE HYDROCHLORIDE 2 MILLIGRAM(S): 2 INJECTION INTRAMUSCULAR; INTRAVENOUS; SUBCUTANEOUS at 09:13

## 2023-01-01 RX ADMIN — HEPARIN SODIUM 5000 UNIT(S): 5000 INJECTION INTRAVENOUS; SUBCUTANEOUS at 10:47

## 2023-01-01 RX ADMIN — FENTANYL CITRATE 1 PATCH: 50 INJECTION INTRAVENOUS at 19:16

## 2023-01-01 RX ADMIN — SODIUM CHLORIDE 1000 MILLILITER(S): 9 INJECTION INTRAMUSCULAR; INTRAVENOUS; SUBCUTANEOUS at 18:30

## 2023-01-01 RX ADMIN — Medication 1000 MILLIGRAM(S): at 10:36

## 2023-01-01 RX ADMIN — HYDROMORPHONE HYDROCHLORIDE 4 MILLIGRAM(S): 2 INJECTION INTRAMUSCULAR; INTRAVENOUS; SUBCUTANEOUS at 11:49

## 2023-01-01 RX ADMIN — HEPARIN SODIUM 5000 UNIT(S): 5000 INJECTION INTRAVENOUS; SUBCUTANEOUS at 18:19

## 2023-01-01 RX ADMIN — MONTELUKAST 10 MILLIGRAM(S): 4 TABLET, CHEWABLE ORAL at 11:04

## 2023-01-01 RX ADMIN — SODIUM CHLORIDE 1000 MILLILITER(S): 9 INJECTION INTRAMUSCULAR; INTRAVENOUS; SUBCUTANEOUS at 16:38

## 2023-01-01 RX ADMIN — FENTANYL CITRATE 1 PATCH: 50 INJECTION INTRAVENOUS at 19:51

## 2023-01-01 RX ADMIN — CHLORHEXIDINE GLUCONATE 1 APPLICATION(S): 213 SOLUTION TOPICAL at 12:08

## 2023-01-01 RX ADMIN — HYDROMORPHONE HYDROCHLORIDE 4 MILLIGRAM(S): 2 INJECTION INTRAMUSCULAR; INTRAVENOUS; SUBCUTANEOUS at 10:30

## 2023-01-01 RX ADMIN — Medication 650 MILLIGRAM(S): at 21:53

## 2023-01-01 RX ADMIN — Medication 3 MILLIGRAM(S): at 21:46

## 2023-01-01 RX ADMIN — DIATRIZOATE MEGLUMINE 30 MILLILITER(S): 180 INJECTION, SOLUTION INTRAVESICAL at 07:30

## 2023-01-01 RX ADMIN — HEPARIN SODIUM 5000 UNIT(S): 5000 INJECTION INTRAVENOUS; SUBCUTANEOUS at 19:13

## 2023-01-01 RX ADMIN — Medication 1 GRAM(S): at 06:03

## 2023-01-01 RX ADMIN — Medication 30 MILLIGRAM(S): at 23:29

## 2023-01-01 RX ADMIN — Medication 1000 MILLIGRAM(S): at 12:09

## 2023-01-01 RX ADMIN — Medication 50 MILLIEQUIVALENT(S): at 12:56

## 2023-01-01 RX ADMIN — HEPARIN SODIUM 5000 UNIT(S): 5000 INJECTION INTRAVENOUS; SUBCUTANEOUS at 17:47

## 2023-01-01 RX ADMIN — QUETIAPINE FUMARATE 150 MILLIGRAM(S): 200 TABLET, FILM COATED ORAL at 22:55

## 2023-01-01 RX ADMIN — CHLORHEXIDINE GLUCONATE 1 APPLICATION(S): 213 SOLUTION TOPICAL at 11:50

## 2023-01-01 RX ADMIN — OXYCODONE HYDROCHLORIDE 5 MILLIGRAM(S): 5 TABLET ORAL at 19:52

## 2023-01-01 RX ADMIN — QUETIAPINE FUMARATE 100 MILLIGRAM(S): 200 TABLET, FILM COATED ORAL at 21:21

## 2023-01-01 RX ADMIN — Medication 1 GRAM(S): at 11:06

## 2023-01-01 RX ADMIN — Medication 1 GRAM(S): at 11:30

## 2023-01-01 RX ADMIN — HEPARIN SODIUM 5000 UNIT(S): 5000 INJECTION INTRAVENOUS; SUBCUTANEOUS at 02:56

## 2023-01-01 RX ADMIN — HYDROMORPHONE HYDROCHLORIDE 1 MILLIGRAM(S): 2 INJECTION INTRAMUSCULAR; INTRAVENOUS; SUBCUTANEOUS at 05:03

## 2023-01-01 RX ADMIN — PANTOPRAZOLE SODIUM 40 MILLIGRAM(S): 20 TABLET, DELAYED RELEASE ORAL at 05:23

## 2023-01-01 RX ADMIN — QUETIAPINE FUMARATE 50 MILLIGRAM(S): 200 TABLET, FILM COATED ORAL at 22:46

## 2023-01-01 RX ADMIN — HYDROMORPHONE HYDROCHLORIDE 4 MILLIGRAM(S): 2 INJECTION INTRAMUSCULAR; INTRAVENOUS; SUBCUTANEOUS at 13:16

## 2023-01-01 RX ADMIN — OXYCODONE HYDROCHLORIDE 2.5 MILLIGRAM(S): 5 TABLET ORAL at 03:16

## 2023-01-01 RX ADMIN — HYDROMORPHONE HYDROCHLORIDE 2 MILLIGRAM(S): 2 INJECTION INTRAMUSCULAR; INTRAVENOUS; SUBCUTANEOUS at 00:43

## 2023-01-01 RX ADMIN — CHLORHEXIDINE GLUCONATE 1 APPLICATION(S): 213 SOLUTION TOPICAL at 12:37

## 2023-01-01 RX ADMIN — Medication 650 MILLIGRAM(S): at 12:10

## 2023-01-01 RX ADMIN — Medication 400 MILLIGRAM(S): at 11:54

## 2023-01-01 RX ADMIN — SODIUM CHLORIDE 45 MILLILITER(S): 9 INJECTION INTRAMUSCULAR; INTRAVENOUS; SUBCUTANEOUS at 17:34

## 2023-01-01 RX ADMIN — HYDROMORPHONE HYDROCHLORIDE 0.25 MILLIGRAM(S): 2 INJECTION INTRAMUSCULAR; INTRAVENOUS; SUBCUTANEOUS at 14:06

## 2023-01-01 RX ADMIN — SENNA PLUS 1 TABLET(S): 8.6 TABLET ORAL at 11:51

## 2023-01-01 RX ADMIN — ONDANSETRON 4 MILLIGRAM(S): 8 TABLET, FILM COATED ORAL at 13:38

## 2023-01-01 RX ADMIN — QUETIAPINE FUMARATE 150 MILLIGRAM(S): 200 TABLET, FILM COATED ORAL at 22:39

## 2023-01-01 RX ADMIN — HEPARIN SODIUM 5000 UNIT(S): 5000 INJECTION INTRAVENOUS; SUBCUTANEOUS at 22:56

## 2023-01-01 RX ADMIN — Medication 0.5 MILLIGRAM(S): at 03:47

## 2023-01-01 RX ADMIN — Medication 15 MILLIGRAM(S): at 10:47

## 2023-01-01 RX ADMIN — HYDROMORPHONE HYDROCHLORIDE 1 MILLIGRAM(S): 2 INJECTION INTRAMUSCULAR; INTRAVENOUS; SUBCUTANEOUS at 02:33

## 2023-01-01 RX ADMIN — SERTRALINE 50 MILLIGRAM(S): 25 TABLET, FILM COATED ORAL at 11:05

## 2023-01-01 RX ADMIN — HYDROMORPHONE HYDROCHLORIDE 4 MILLIGRAM(S): 2 INJECTION INTRAMUSCULAR; INTRAVENOUS; SUBCUTANEOUS at 13:10

## 2023-01-01 RX ADMIN — Medication 650 MILLIGRAM(S): at 11:50

## 2023-01-01 RX ADMIN — MONTELUKAST 10 MILLIGRAM(S): 4 TABLET, CHEWABLE ORAL at 11:50

## 2023-01-01 RX ADMIN — ONDANSETRON 4 MILLIGRAM(S): 8 TABLET, FILM COATED ORAL at 22:56

## 2023-01-01 RX ADMIN — HEPARIN SODIUM 5000 UNIT(S): 5000 INJECTION INTRAVENOUS; SUBCUTANEOUS at 06:40

## 2023-01-01 RX ADMIN — SERTRALINE 50 MILLIGRAM(S): 25 TABLET, FILM COATED ORAL at 11:37

## 2023-01-01 RX ADMIN — OXYCODONE HYDROCHLORIDE 5 MILLIGRAM(S): 5 TABLET ORAL at 18:52

## 2023-01-01 RX ADMIN — Medication 0.5 MILLIGRAM(S): at 07:44

## 2023-01-01 RX ADMIN — Medication 15 MILLIGRAM(S): at 21:40

## 2023-01-01 RX ADMIN — QUETIAPINE FUMARATE 50 MILLIGRAM(S): 200 TABLET, FILM COATED ORAL at 22:08

## 2023-01-01 RX ADMIN — Medication 1 GRAM(S): at 11:13

## 2023-01-01 RX ADMIN — Medication 650 MILLIGRAM(S): at 23:00

## 2023-01-01 RX ADMIN — ONDANSETRON 4 MILLIGRAM(S): 8 TABLET, FILM COATED ORAL at 18:42

## 2023-01-01 RX ADMIN — CHLORHEXIDINE GLUCONATE 1 APPLICATION(S): 213 SOLUTION TOPICAL at 11:11

## 2023-01-01 RX ADMIN — SENNA PLUS 2 TABLET(S): 8.6 TABLET ORAL at 22:08

## 2023-01-01 RX ADMIN — SODIUM CHLORIDE 1000 MILLILITER(S): 9 INJECTION INTRAMUSCULAR; INTRAVENOUS; SUBCUTANEOUS at 07:50

## 2023-01-01 RX ADMIN — HYDROMORPHONE HYDROCHLORIDE 4 MILLIGRAM(S): 2 INJECTION INTRAMUSCULAR; INTRAVENOUS; SUBCUTANEOUS at 16:00

## 2023-01-01 RX ADMIN — POLYETHYLENE GLYCOL 3350 17 GRAM(S): 17 POWDER, FOR SOLUTION ORAL at 05:14

## 2023-01-01 RX ADMIN — SODIUM CHLORIDE 250 MILLILITER(S): 9 INJECTION INTRAMUSCULAR; INTRAVENOUS; SUBCUTANEOUS at 23:39

## 2023-01-01 RX ADMIN — HEPARIN SODIUM 5000 UNIT(S): 5000 INJECTION INTRAVENOUS; SUBCUTANEOUS at 05:43

## 2023-01-01 RX ADMIN — HYDROMORPHONE HYDROCHLORIDE 1 MILLIGRAM(S): 2 INJECTION INTRAMUSCULAR; INTRAVENOUS; SUBCUTANEOUS at 06:00

## 2023-01-01 RX ADMIN — SODIUM CHLORIDE 60 MILLILITER(S): 9 INJECTION INTRAMUSCULAR; INTRAVENOUS; SUBCUTANEOUS at 08:21

## 2023-01-01 RX ADMIN — QUETIAPINE FUMARATE 50 MILLIGRAM(S): 200 TABLET, FILM COATED ORAL at 21:46

## 2023-01-01 RX ADMIN — HYDROMORPHONE HYDROCHLORIDE 4 MILLIGRAM(S): 2 INJECTION INTRAMUSCULAR; INTRAVENOUS; SUBCUTANEOUS at 14:00

## 2023-01-01 RX ADMIN — SODIUM CHLORIDE 115 MILLILITER(S): 9 INJECTION, SOLUTION INTRAVENOUS at 18:49

## 2023-01-01 RX ADMIN — PANTOPRAZOLE SODIUM 40 MILLIGRAM(S): 20 TABLET, DELAYED RELEASE ORAL at 06:10

## 2023-01-01 RX ADMIN — Medication 1 GRAM(S): at 10:09

## 2023-01-01 RX ADMIN — MORPHINE SULFATE 4 MILLIGRAM(S): 50 CAPSULE, EXTENDED RELEASE ORAL at 10:52

## 2023-01-01 RX ADMIN — PANTOPRAZOLE SODIUM 40 MILLIGRAM(S): 20 TABLET, DELAYED RELEASE ORAL at 06:40

## 2023-01-01 RX ADMIN — QUETIAPINE FUMARATE 50 MILLIGRAM(S): 200 TABLET, FILM COATED ORAL at 21:05

## 2023-01-01 RX ADMIN — SERTRALINE 50 MILLIGRAM(S): 25 TABLET, FILM COATED ORAL at 11:51

## 2023-01-01 RX ADMIN — PANTOPRAZOLE SODIUM 40 MILLIGRAM(S): 20 TABLET, DELAYED RELEASE ORAL at 06:43

## 2023-01-01 RX ADMIN — HYDROMORPHONE HYDROCHLORIDE 4 MILLIGRAM(S): 2 INJECTION INTRAMUSCULAR; INTRAVENOUS; SUBCUTANEOUS at 15:01

## 2023-01-01 RX ADMIN — MONTELUKAST 10 MILLIGRAM(S): 4 TABLET, CHEWABLE ORAL at 11:13

## 2023-01-01 RX ADMIN — HYDROMORPHONE HYDROCHLORIDE 4 MILLIGRAM(S): 2 INJECTION INTRAMUSCULAR; INTRAVENOUS; SUBCUTANEOUS at 23:01

## 2023-01-01 RX ADMIN — MORPHINE SULFATE 2 MILLIGRAM(S): 50 CAPSULE, EXTENDED RELEASE ORAL at 15:53

## 2023-01-01 RX ADMIN — Medication 100 MILLILITER(S): at 11:20

## 2023-01-01 RX ADMIN — Medication 5 MILLIGRAM(S): at 03:18

## 2023-01-01 RX ADMIN — HYDROMORPHONE HYDROCHLORIDE 4 MILLIGRAM(S): 2 INJECTION INTRAMUSCULAR; INTRAVENOUS; SUBCUTANEOUS at 23:11

## 2023-01-01 RX ADMIN — HEPARIN SODIUM 5000 UNIT(S): 5000 INJECTION INTRAVENOUS; SUBCUTANEOUS at 03:51

## 2023-01-01 RX ADMIN — Medication 15 MILLIGRAM(S): at 16:39

## 2023-01-01 RX ADMIN — POLYETHYLENE GLYCOL 3350 17 GRAM(S): 17 POWDER, FOR SOLUTION ORAL at 17:02

## 2023-01-01 RX ADMIN — Medication 100 GRAM(S): at 16:39

## 2023-01-01 RX ADMIN — ONDANSETRON 4 MILLIGRAM(S): 8 TABLET, FILM COATED ORAL at 17:01

## 2023-01-01 RX ADMIN — Medication 0.5 MILLIGRAM(S): at 23:27

## 2023-01-01 RX ADMIN — CHLORHEXIDINE GLUCONATE 1 APPLICATION(S): 213 SOLUTION TOPICAL at 11:06

## 2023-01-01 RX ADMIN — HEPARIN SODIUM 5000 UNIT(S): 5000 INJECTION INTRAVENOUS; SUBCUTANEOUS at 17:18

## 2023-01-01 RX ADMIN — HEPARIN SODIUM 5000 UNIT(S): 5000 INJECTION INTRAVENOUS; SUBCUTANEOUS at 11:37

## 2023-01-01 RX ADMIN — QUETIAPINE FUMARATE 150 MILLIGRAM(S): 200 TABLET, FILM COATED ORAL at 00:08

## 2023-01-01 RX ADMIN — POLYETHYLENE GLYCOL 3350 17 GRAM(S): 17 POWDER, FOR SOLUTION ORAL at 06:06

## 2023-01-01 RX ADMIN — QUETIAPINE FUMARATE 50 MILLIGRAM(S): 200 TABLET, FILM COATED ORAL at 21:55

## 2023-01-01 RX ADMIN — Medication 0.5 MILLIGRAM(S): at 00:08

## 2023-01-01 RX ADMIN — HYDROMORPHONE HYDROCHLORIDE 2 MILLIGRAM(S): 2 INJECTION INTRAMUSCULAR; INTRAVENOUS; SUBCUTANEOUS at 06:32

## 2023-01-01 RX ADMIN — PANTOPRAZOLE SODIUM 40 MILLIGRAM(S): 20 TABLET, DELAYED RELEASE ORAL at 06:11

## 2023-01-01 RX ADMIN — SODIUM CHLORIDE 115 MILLILITER(S): 9 INJECTION, SOLUTION INTRAVENOUS at 18:14

## 2023-01-01 RX ADMIN — HYDROMORPHONE HYDROCHLORIDE 1 MILLIGRAM(S): 2 INJECTION INTRAMUSCULAR; INTRAVENOUS; SUBCUTANEOUS at 22:00

## 2023-01-01 RX ADMIN — Medication 0.5 MILLIGRAM(S): at 19:26

## 2023-01-01 RX ADMIN — Medication 50 MILLILITER(S): at 21:17

## 2023-01-01 RX ADMIN — CHLORHEXIDINE GLUCONATE 1 APPLICATION(S): 213 SOLUTION TOPICAL at 11:31

## 2023-01-01 RX ADMIN — MORPHINE SULFATE 4 MILLIGRAM(S): 50 CAPSULE, EXTENDED RELEASE ORAL at 07:51

## 2023-01-01 RX ADMIN — HYDROMORPHONE HYDROCHLORIDE 4 MILLIGRAM(S): 2 INJECTION INTRAMUSCULAR; INTRAVENOUS; SUBCUTANEOUS at 11:30

## 2023-01-01 RX ADMIN — MONTELUKAST 10 MILLIGRAM(S): 4 TABLET, CHEWABLE ORAL at 11:37

## 2023-01-01 RX ADMIN — Medication 1 GRAM(S): at 07:37

## 2023-01-01 RX ADMIN — Medication 400 MILLIGRAM(S): at 19:13

## 2023-01-01 RX ADMIN — POLYETHYLENE GLYCOL 3350 17 GRAM(S): 17 POWDER, FOR SOLUTION ORAL at 06:10

## 2023-01-01 RX ADMIN — Medication 15 MILLIGRAM(S): at 21:15

## 2023-01-01 RX ADMIN — Medication 15 MILLIGRAM(S): at 16:24

## 2023-01-01 RX ADMIN — HYDROMORPHONE HYDROCHLORIDE 4 MILLIGRAM(S): 2 INJECTION INTRAMUSCULAR; INTRAVENOUS; SUBCUTANEOUS at 21:46

## 2023-01-01 RX ADMIN — Medication 50 MILLIGRAM(S): at 02:56

## 2023-01-01 RX ADMIN — SERTRALINE 50 MILLIGRAM(S): 25 TABLET, FILM COATED ORAL at 12:54

## 2023-01-01 RX ADMIN — HEPARIN SODIUM 5000 UNIT(S): 5000 INJECTION INTRAVENOUS; SUBCUTANEOUS at 06:10

## 2023-01-01 RX ADMIN — HYDROMORPHONE HYDROCHLORIDE 4 MILLIGRAM(S): 2 INJECTION INTRAMUSCULAR; INTRAVENOUS; SUBCUTANEOUS at 22:46

## 2023-01-01 RX ADMIN — Medication 1 PACKET(S): at 12:55

## 2023-01-01 RX ADMIN — MONTELUKAST 10 MILLIGRAM(S): 4 TABLET, CHEWABLE ORAL at 12:54

## 2023-01-01 RX ADMIN — SENNA PLUS 2 TABLET(S): 8.6 TABLET ORAL at 22:00

## 2023-01-01 RX ADMIN — Medication 1 GRAM(S): at 18:15

## 2023-01-01 RX ADMIN — HYDROMORPHONE HYDROCHLORIDE 1 MILLIGRAM(S): 2 INJECTION INTRAMUSCULAR; INTRAVENOUS; SUBCUTANEOUS at 18:18

## 2023-01-01 RX ADMIN — MORPHINE SULFATE 4 MILLIGRAM(S): 50 CAPSULE, EXTENDED RELEASE ORAL at 14:21

## 2023-01-01 RX ADMIN — Medication 1 GRAM(S): at 10:59

## 2023-01-01 RX ADMIN — HYDROMORPHONE HYDROCHLORIDE 4 MILLIGRAM(S): 2 INJECTION INTRAMUSCULAR; INTRAVENOUS; SUBCUTANEOUS at 22:01

## 2023-01-01 RX ADMIN — HYDROMORPHONE HYDROCHLORIDE 4 MILLIGRAM(S): 2 INJECTION INTRAMUSCULAR; INTRAVENOUS; SUBCUTANEOUS at 07:00

## 2023-01-01 RX ADMIN — Medication 50 MILLILITER(S): at 01:51

## 2023-01-01 RX ADMIN — ONDANSETRON 4 MILLIGRAM(S): 8 TABLET, FILM COATED ORAL at 07:50

## 2023-01-01 RX ADMIN — SODIUM CHLORIDE 60 MILLILITER(S): 9 INJECTION INTRAMUSCULAR; INTRAVENOUS; SUBCUTANEOUS at 17:18

## 2023-01-01 RX ADMIN — HYDROMORPHONE HYDROCHLORIDE 4 MILLIGRAM(S): 2 INJECTION INTRAMUSCULAR; INTRAVENOUS; SUBCUTANEOUS at 21:05

## 2023-01-01 RX ADMIN — AMLODIPINE BESYLATE 10 MILLIGRAM(S): 2.5 TABLET ORAL at 06:48

## 2023-01-01 RX ADMIN — HYDROMORPHONE HYDROCHLORIDE 2 MILLIGRAM(S): 2 INJECTION INTRAMUSCULAR; INTRAVENOUS; SUBCUTANEOUS at 01:45

## 2023-01-01 RX ADMIN — ONDANSETRON 4 MILLIGRAM(S): 8 TABLET, FILM COATED ORAL at 14:27

## 2023-01-01 RX ADMIN — POLYETHYLENE GLYCOL 3350 17 GRAM(S): 17 POWDER, FOR SOLUTION ORAL at 06:40

## 2023-01-01 RX ADMIN — Medication 0.5 MILLIGRAM(S): at 14:10

## 2023-01-01 RX ADMIN — HYDROMORPHONE HYDROCHLORIDE 4 MILLIGRAM(S): 2 INJECTION INTRAMUSCULAR; INTRAVENOUS; SUBCUTANEOUS at 15:06

## 2023-01-01 RX ADMIN — MORPHINE SULFATE 2 MILLIGRAM(S): 50 CAPSULE, EXTENDED RELEASE ORAL at 16:08

## 2023-01-01 RX ADMIN — HYDROMORPHONE HYDROCHLORIDE 1 MILLIGRAM(S): 2 INJECTION INTRAMUSCULAR; INTRAVENOUS; SUBCUTANEOUS at 21:00

## 2023-01-01 RX ADMIN — Medication 0.5 MILLIGRAM(S): at 07:02

## 2023-01-01 RX ADMIN — SODIUM CHLORIDE 115 MILLILITER(S): 9 INJECTION, SOLUTION INTRAVENOUS at 07:37

## 2023-01-01 RX ADMIN — Medication 25 GRAM(S): at 10:47

## 2023-01-01 RX ADMIN — HYDROMORPHONE HYDROCHLORIDE 4 MILLIGRAM(S): 2 INJECTION INTRAMUSCULAR; INTRAVENOUS; SUBCUTANEOUS at 08:10

## 2023-01-01 RX ADMIN — HYDROMORPHONE HYDROCHLORIDE 4 MILLIGRAM(S): 2 INJECTION INTRAMUSCULAR; INTRAVENOUS; SUBCUTANEOUS at 05:17

## 2023-01-01 RX ADMIN — AMLODIPINE BESYLATE 10 MILLIGRAM(S): 2.5 TABLET ORAL at 05:58

## 2023-01-01 RX ADMIN — HYDROMORPHONE HYDROCHLORIDE 4 MILLIGRAM(S): 2 INJECTION INTRAMUSCULAR; INTRAVENOUS; SUBCUTANEOUS at 23:42

## 2023-01-01 RX ADMIN — OXYCODONE HYDROCHLORIDE 2.5 MILLIGRAM(S): 5 TABLET ORAL at 04:20

## 2023-01-01 RX ADMIN — HYDROMORPHONE HYDROCHLORIDE 0.25 MILLIGRAM(S): 2 INJECTION INTRAMUSCULAR; INTRAVENOUS; SUBCUTANEOUS at 17:15

## 2023-01-01 RX ADMIN — HYDROMORPHONE HYDROCHLORIDE 2 MILLIGRAM(S): 2 INJECTION INTRAMUSCULAR; INTRAVENOUS; SUBCUTANEOUS at 22:24

## 2023-01-01 RX ADMIN — HYDROMORPHONE HYDROCHLORIDE 0.5 MILLIGRAM(S): 2 INJECTION INTRAMUSCULAR; INTRAVENOUS; SUBCUTANEOUS at 21:39

## 2023-01-01 RX ADMIN — MONTELUKAST 10 MILLIGRAM(S): 4 TABLET, CHEWABLE ORAL at 11:29

## 2023-01-01 RX ADMIN — AMLODIPINE BESYLATE 10 MILLIGRAM(S): 2.5 TABLET ORAL at 05:56

## 2023-01-01 RX ADMIN — SERTRALINE 50 MILLIGRAM(S): 25 TABLET, FILM COATED ORAL at 11:13

## 2023-01-01 RX ADMIN — HYDROMORPHONE HYDROCHLORIDE 4 MILLIGRAM(S): 2 INJECTION INTRAMUSCULAR; INTRAVENOUS; SUBCUTANEOUS at 14:06

## 2023-01-01 RX ADMIN — HYDROMORPHONE HYDROCHLORIDE 2 MILLIGRAM(S): 2 INJECTION INTRAMUSCULAR; INTRAVENOUS; SUBCUTANEOUS at 16:12

## 2023-01-01 RX ADMIN — MORPHINE SULFATE 4 MILLIGRAM(S): 50 CAPSULE, EXTENDED RELEASE ORAL at 10:22

## 2023-01-01 RX ADMIN — Medication 1000 MILLIGRAM(S): at 11:36

## 2023-01-01 RX ADMIN — HYDROMORPHONE HYDROCHLORIDE 1 MILLIGRAM(S): 2 INJECTION INTRAMUSCULAR; INTRAVENOUS; SUBCUTANEOUS at 13:33

## 2023-01-01 RX ADMIN — HYDROMORPHONE HYDROCHLORIDE 4 MILLIGRAM(S): 2 INJECTION INTRAMUSCULAR; INTRAVENOUS; SUBCUTANEOUS at 06:06

## 2023-01-01 RX ADMIN — POLYETHYLENE GLYCOL 3350 17 GRAM(S): 17 POWDER, FOR SOLUTION ORAL at 18:19

## 2023-03-06 NOTE — H&P ADULT - NSHPLABSRESULTS_GEN_ALL_CORE
LABS:                         11.7   9.29  )-----------( 431      ( 06 Mar 2023 07:17 )             35.4     03-06    135  |  96  |  30<H>  ----------------------------<  120<H>  3.4<L>   |  24  |  0.95    Ca    10.0      06 Mar 2023 07:17    TPro  7.5  /  Alb  4.2  /  TBili  0.5  /  DBili  x   /  AST  56<H>  /  ALT  48<H>  /  AlkPhos  425<H>  03-06    PT/INR - ( 06 Mar 2023 07:17 )   PT: 13.8 sec;   INR: 1.16          PTT - ( 06 Mar 2023 07:17 )  PTT:31.0 sec

## 2023-03-06 NOTE — CONSULT NOTE ADULT - ASSESSMENT
63 y/o f with PMH and jejunum ca with hepatic metastasis with poor PO intake, though no e/o GOO on imaging    #Jejunal malignancy with hepatic mets  #Poor PO intake  #Elevated liver enzymes    No collateral info available to confirm pathology.    Reassured by CT ruling out GOO. Unclear whether poor appetite sequelae of advanced disease, progression of disease in bowel or side effect to therapy.    Liver enzymes likely due to hepatic mets. Unknown baseline    Recommendations:  -Diet as tolerated today  -NPO at midnight for EGD 3.7  -Please obtain collateral hx for complete clinical context/pathology    Jamil Guerrero DO  Gastroenterology Fellow  Pager: [phone]  61 y/o f with PMH and jejunum ca with hepatic metastasis with poor PO intake, though no e/o GOO on imaging    #Jejunal malignancy with hepatic mets  #Poor PO intake  #Elevated liver enzymes    No collateral info available to confirm pathology.    Reassured by CT ruling out GOO. Unclear whether poor appetite sequelae of advanced disease, progression of disease in bowel or side effect to therapy.    Liver enzymes likely due to hepatic mets. Unknown baseline    Recommendations:  -Diet as tolerated today  -NPO at midnight for EGD 3.7  -Please obtain collateral hx for complete clinical context/pathology    Jamil Guerrero DO  Gastroenterology Fellow  Pager: [phone]  61 y/o f with PMH and jejunum ca with hepatic metastasis with poor PO intake, though no e/o GOO on imaging    #Jejunal malignancy with hepatic mets  #Poor PO intake  #Elevated liver enzymes    No collateral info available to confirm pathology.    Reassured by CT ruling out GOO. Unclear whether poor appetite sequelae of advanced disease, progression of disease in bowel or side effect to therapy.    Liver enzymes likely due to hepatic mets. Unknown baseline    Recommendations:  -Diet as tolerated today  -NPO at midnight for EGD 3.7  -Please obtain collateral hx for complete clinical context/pathology    Jamil Guerrero DO  Gastroenterology Fellow  Pager: [phone]

## 2023-03-06 NOTE — ED PROVIDER NOTE - CARDIAC, MLM
Result known -done in PACU
Normal rate, regular rhythm.  Heart sounds S1, S2.  No murmurs, rubs or gallops.

## 2023-03-06 NOTE — H&P ADULT - NSICDXPASTMEDICALHX_GEN_ALL_CORE_FT
PAST MEDICAL HISTORY:  Anxiety     History of COPD     HTN (hypertension)     Small bowel carcinoma

## 2023-03-06 NOTE — H&P ADULT - NSHPSOCIALHISTORY_GEN_ALL_CORE
Patient lives with her  and daughter.  Xsmoker, last smoked 1 m ago. 50 pack year  Drinks 4 drinks a week, Vodka and wine  Once or twice smokes marijuana a week.

## 2023-03-06 NOTE — ED ADULT TRIAGE NOTE - CHIEF COMPLAINT QUOTE
Pt presents with c/o generalized "abdominal pain". Dx with small intestine CA in Feb. Referred to ER by PCP. Pt presents with c/o generalized "abdominal pain". Dx with small intestine CA in Feb. Referred to ER by PCP, Dr. LIDA Maldonado and Dr. ANDREE Bravo ([phone]); pt arrives with dicatation by same to have "CT scan with PO/IV contrast, baseline blood work, EKG and chest xray" for admission purposes of admission  for gastric stent. Pt presents with c/o generalized "abdominal pain". Dx with small intestine CA in Feb. Referred to ER by PCP, Dr. LIDA Maldonado and Dr. ANDREE Bravo ([phone]); pt arrives with dicatation by same to have "CT scan with PO/IV contrast, baseline blood work, EKG and chest xray" for admission purposes of admission  for gastric stent. Pt presents with c/o generalized "abdominal pain". Dx with small intestine CA in Feb. Referred to ER by PCP, Dr. LIDA Maldonado and Dr. ANDREE Bravo ([phone]); pt arrives with dicatation by same to have "CT scan with PO/IV contrast, baseline blood work, EKG and chest xray" for admission purposes of admission  for gastric stent. Pt presents with c/o generalized "abdominal pain". Dx with small intestine CA in Feb. Referred to ER by PCP, Dr. LIDA Maldonado and Dr. ANDREE Bravo ([phone]); pt arrives with dictation by same to have "CT scan with PO/IV contrast, baseline blood work, EKG and chest xray" for admission purposes/sx for gastric stent. Pt presents with c/o generalized "abdominal pain". Dx with small intestine CA in Feb. Referred to ER by PCP, Dr. LIDA Maldonado and Dr. ANDREE Bravo ([phone]); pt arrives with dictation by same to have "CT scan with PO/IV contrast, baseline blood work, EKG and chest xray" for admission purposes/sx for gastric stent. Pt presents with c/o generalized "abdominal pain". Dx with small intestine CA in Feb. Referred to ER by PCP, Dr. LIDA Maldonado and Dr. ANDREE Bravo ([phone]); pt arrives with dictation by same to have "CT scan with PO/IV contrast, baseline blood work, EKG and chest xray" for admission purposes/sx for gastric stent.

## 2023-03-06 NOTE — CONSULT NOTE ADULT - ASSESSMENT
This is a 62-yo female with COPD, HTN, HLD, anxiety, and newly diagnosed metastatic jejunal adenocarcinoma presenting with in referral from oncologist for impending GOO.      N: Continue to monitor for pain control. Hold opoids.   HD: Monitor - stable on floor.   CV: Monitor.   Pulm: Monitor - continue home montelukast.   GI: Continue NPO with IVF support. Pantoprazole daily. Ondansetron PRN. Appreciate GI recommendations.   : Continue to trend UOP without indwelling catheter  Wound: Dressing changes   ID: No ABx indicated.  Endo: No history of DM  Heme: Monitor to trend daily labs.  Fluids: Continue LR at 115  PPx: SQH tid.  Dispo: Regular floor level of care admitted under Dr. Bravo

## 2023-03-06 NOTE — ED PROVIDER NOTE - CLINICAL SUMMARY MEDICAL DECISION MAKING FREE TEXT BOX
Pt with recent dx of jejunum ca with mets presents with abd pain - here for CT and for further mgmt of possible gastric outlet obstruction. Pt with recent dx of jejunum ca with mets presents with abd pain - here for CT and for further mgmt of possible gastric outlet obstruction.    CT neg for outlet obstructions.  Discussed with Dr. Mckee who requests admission for endoscopy with Dr. Mosher secondary to difficulty tolerating solids.  GI fellow called who evaluated pt and will put pt on schedule for endoscopy tomorrow.      Pt required multiple doses of pain medication secondary to abd pain most likely from ca with mets.

## 2023-03-06 NOTE — CONSULT NOTE ADULT - NSCONSULTADDITIONALINFOA_GEN_ALL_CORE
CHIEF RESIDENT ADDENDUM  62F with new diagnosis of proximal jejunal mass with liver metastasis presents for further workup. One episode of vomiting this afternoon, otherwise no nausea. Passing flatus, having BMs. VSS, abdomen softly distended, nontender. CBC normal. CT with proximal jejunal thickening, upper abdominal adenopathy and liver metastases. Possibly small bowel primary but unclear etiology. NPO, IVF, HSQ today. Any vomiting, will place NGT. GI already following. Plan for OR in 2 days for diagnostic laparoscopy, robotic-assisted gastrojejunal vs jejunojejunal bypass for palliation. Examined patient with attending surgeon.

## 2023-03-06 NOTE — CONSULT NOTE ADULT - SUBJECTIVE AND OBJECTIVE BOX
63 y/o f with PMH of HTN, HLD, recent dx of jejunum ca with mets sent by her oncologist, Dr. Mckee, for workup including ct abd/pelvis to r/o gastric outlet obstruction.  Pt dx on 1/23.  Has had port placed and started oral chemo and received iron infusions this week though developed worsening abd pain starting on 3/3 and Dr. Mckee was concerned for gastric outlet obstruction from her carcinoma and referred pt to the ED for further eval.    At bedside, pt describes worsening of L sided abd pain, however struggles mostly with lack of appetite When she is able to eat, food passes into stomach without difficult. No unusual bloating, nausea, vomiting or diarrhea.  Specifically feels difficulty is with desire to eat.      Allergies    No Known Allergies    Intolerances      Home Medications:    MEDICATIONS:  MEDICATIONS  (STANDING):  morphine  - Injectable 4 milliGRAM(s) IV Push Once  morphine  - Injectable 4 milliGRAM(s) IV Push Once    MEDICATIONS  (PRN):    PAST MEDICAL & SURGICAL HISTORY:    FAMILY HISTORY:    SOCIAL HISTORY:  Tobacoo: [ ] Current, [ ] Former, [ ] Never; Pack Years:  Alcohol:  Illicit Drugs:    REVIEW OF SYSTEMS:  All other 10 review of systems is negative unless indicated above.    Vital Signs Last 24 Hrs  T(C): 36.7 (06 Mar 2023 12:53), Max: 36.7 (06 Mar 2023 06:49)  T(F): 98.1 (06 Mar 2023 12:53), Max: 98.1 (06 Mar 2023 06:49)  HR: 74 (06 Mar 2023 12:53) (74 - 886)  BP: 108/72 (06 Mar 2023 12:53) (108/72 - 126/82)  BP(mean): --  RR: 18 (06 Mar 2023 12:53) (16 - 18)  SpO2: 95% (06 Mar 2023 12:53) (95% - 100%)    Parameters below as of 06 Mar 2023 12:53  Patient On (Oxygen Delivery Method): room air  PHYSICAL EXAM:    General: No acute distress  Eyes: Anicteric sclerae, moist conjunctivae  HENT: Moist mucous membranes  Neck: Trachea midline, supple  Lungs: Normal respiratory effort and no intercostal retractions  Cardiovascular: RRR  Abdomen: Soft, mildly distended, L sided TTP; No rebound or guarding  Neurological: Alert and oriented x3  Skin: Warm and dry. No obvious rash    LABS:                        11.7   9.29  )-----------( 431      ( 06 Mar 2023 07:17 )             35.4     03-06    135  |  96  |  30<H>  ----------------------------<  120<H>  3.4<L>   |  24  |  0.95    Ca    10.0      06 Mar 2023 07:17    TPro  7.5  /  Alb  4.2  /  TBili  0.5  /  DBili  x   /  AST  56<H>  /  ALT  48<H>  /  AlkPhos  425<H>  03-06        PT/INR - ( 06 Mar 2023 07:17 )   PT: 13.8 sec;   INR: 1.16          PTT - ( 06 Mar 2023 07:17 )  PTT:31.0 sec    ACC: [id number] EXAM:  CT ABDOMEN AND PELVIS OC IC   ORDERED BY: GERARD ANTOINE     PROCEDURE DATE:  03/06/2023          INTERPRETATION:  CLINICAL INFORMATION: Metastatic small bowel cancer.   Concern for metastatic occluded obstruction.    COMPARISON: None.    CONTRAST/COMPLICATIONS:  IV Contrast: Isovue 370  90 cc administered   10 cc discarded  Oral Contrast: Gastroview  Complications: None reported at time of study completion    PROCEDURE:  CT of the Abdomen and Pelvis was performed.  Sagittal and coronal reformats were performed.    FINDINGS:  LOWER CHEST: Within normal limits.    LIVER: Multiple bilobar liver metastases, for example:  *  Segment 2, 1.9 x 1.4 cm  *  Segment 8, 2.9 x 2.7 cm  BILE DUCTS: Normal caliber.  GALLBLADDER: Within normal limits.  SPLEEN: Within normal limits.  PANCREAS: Within normal limits.  ADRENALS: Within normal limits.  KIDNEYS/URETERS: Tiny cysts and bilateral linear cortical hypodensities   and foci of scaring, probably postinflammatory.    BLADDER: Within normal limits.  REPRODUCTIVE ORGANS: Within normal limits    BOWEL/PERITONEUM: No bowel obstruction or significant gastric   distention.Left upper quadrant short segment of jejunal mural thickening   and probable ulceration (3, 62; 5, 41), probably reflecting known   malignancy.  RETROPERITONEUM/LYMPH NODES: Multifocal abdominal adenopathy, for example:  *  Periportal (3, 44), 2.8 x 2.3 cm  *  Portacaval (3, 49) 1.9 x 1.8 cm  *  Right common iliac (1.4 x 0.9 cm    BONES AND SOFT TISSUES: No suspicious lesion.    IMPRESSION:    1. No small bowel or gastric outlet obstruction.  2. Jejunal tumor,  samuel and multiple bilobar hepatic metastases.    --- End of Report ---          KEATON TOMAS MD; Attending Radiologist  This document has been electronically signed.  KEATON TOMAS MD; Attending Radiologist  This document has been electronically signed. Mar  6 2023  9:30AM    61 y/o f with PMH of HTN, HLD, recent dx of jejunum ca with mets sent by her oncologist, Dr. Mckee, for workup including ct abd/pelvis to r/o gastric outlet obstruction.  Pt dx on 1/23.  Has had port placed and started oral chemo and received iron infusions this week though developed worsening abd pain starting on 3/3 and Dr. Mckee was concerned for gastric outlet obstruction from her carcinoma and referred pt to the ED for further eval.    At bedside, pt describes worsening of L sided abd pain, however struggles mostly with lack of appetite When she is able to eat, food passes into stomach without difficult. No unusual bloating, nausea, vomiting or diarrhea.  Specifically feels difficulty is with desire to eat.      Allergies    No Known Allergies    Intolerances      Home Medications:    MEDICATIONS:  MEDICATIONS  (STANDING):  morphine  - Injectable 4 milliGRAM(s) IV Push Once  morphine  - Injectable 4 milliGRAM(s) IV Push Once    MEDICATIONS  (PRN):    PAST MEDICAL & SURGICAL HISTORY:    FAMILY HISTORY:    SOCIAL HISTORY:  Tobacoo: [ ] Current, [ ] Former, [ ] Never; Pack Years:  Alcohol:  Illicit Drugs:    REVIEW OF SYSTEMS:  All other 10 review of systems is negative unless indicated above.    Vital Signs Last 24 Hrs  T(C): 36.7 (06 Mar 2023 12:53), Max: 36.7 (06 Mar 2023 06:49)  T(F): 98.1 (06 Mar 2023 12:53), Max: 98.1 (06 Mar 2023 06:49)  HR: 74 (06 Mar 2023 12:53) (74 - 886)  BP: 108/72 (06 Mar 2023 12:53) (108/72 - 126/82)  BP(mean): --  RR: 18 (06 Mar 2023 12:53) (16 - 18)  SpO2: 95% (06 Mar 2023 12:53) (95% - 100%)    Parameters below as of 06 Mar 2023 12:53  Patient On (Oxygen Delivery Method): room air  PHYSICAL EXAM:    General: No acute distress  Eyes: Anicteric sclerae, moist conjunctivae  HENT: Moist mucous membranes  Neck: Trachea midline, supple  Lungs: Normal respiratory effort and no intercostal retractions  Cardiovascular: RRR  Abdomen: Soft, mildly distended, L sided TTP; No rebound or guarding  Neurological: Alert and oriented x3  Skin: Warm and dry. No obvious rash    LABS:                        11.7   9.29  )-----------( 431      ( 06 Mar 2023 07:17 )             35.4     03-06    135  |  96  |  30<H>  ----------------------------<  120<H>  3.4<L>   |  24  |  0.95    Ca    10.0      06 Mar 2023 07:17    TPro  7.5  /  Alb  4.2  /  TBili  0.5  /  DBili  x   /  AST  56<H>  /  ALT  48<H>  /  AlkPhos  425<H>  03-06        PT/INR - ( 06 Mar 2023 07:17 )   PT: 13.8 sec;   INR: 1.16          PTT - ( 06 Mar 2023 07:17 )  PTT:31.0 sec    ACC: [id number] EXAM:  CT ABDOMEN AND PELVIS OC IC   ORDERED BY: GERARD ANTOINE     PROCEDURE DATE:  03/06/2023          INTERPRETATION:  CLINICAL INFORMATION: Metastatic small bowel cancer.   Concern for metastatic occluded obstruction.    COMPARISON: None.    CONTRAST/COMPLICATIONS:  IV Contrast: Isovue 370  90 cc administered   10 cc discarded  Oral Contrast: Gastroview  Complications: None reported at time of study completion    PROCEDURE:  CT of the Abdomen and Pelvis was performed.  Sagittal and coronal reformats were performed.    FINDINGS:  LOWER CHEST: Within normal limits.    LIVER: Multiple bilobar liver metastases, for example:  *  Segment 2, 1.9 x 1.4 cm  *  Segment 8, 2.9 x 2.7 cm  BILE DUCTS: Normal caliber.  GALLBLADDER: Within normal limits.  SPLEEN: Within normal limits.  PANCREAS: Within normal limits.  ADRENALS: Within normal limits.  KIDNEYS/URETERS: Tiny cysts and bilateral linear cortical hypodensities   and foci of scaring, probably postinflammatory.    BLADDER: Within normal limits.  REPRODUCTIVE ORGANS: Within normal limits    BOWEL/PERITONEUM: No bowel obstruction or significant gastric   distention.Left upper quadrant short segment of jejunal mural thickening   and probable ulceration (3, 62; 5, 41), probably reflecting known   malignancy.  RETROPERITONEUM/LYMPH NODES: Multifocal abdominal adenopathy, for example:  *  Periportal (3, 44), 2.8 x 2.3 cm  *  Portacaval (3, 49) 1.9 x 1.8 cm  *  Right common iliac (1.4 x 0.9 cm    BONES AND SOFT TISSUES: No suspicious lesion.    IMPRESSION:    1. No small bowel or gastric outlet obstruction.  2. Jejunal tumor,  samuel and multiple bilobar hepatic metastases.    --- End of Report ---          KEATON TOMAS MD; Attending Radiologist  This document has been electronically signed.  KEATON TOMAS MD; Attending Radiologist  This document has been electronically signed. Mar  6 2023  9:30AM    61 y/o f with PMH of HTN, HLD, recent dx of jejunum ca with mets sent by her oncologist, Dr. Mckee, for workup including ct abd/pelvis to r/o gastric outlet obstruction.  Pt dx on 1/23.  Has had port placed and started oral chemo and received iron infusions this week though developed worsening abd pain starting on 3/3 and Dr. Mckee was concerned for gastric outlet obstruction from her carcinoma and referred pt to the ED for further eval.    At bedside, pt describes worsening of L sided abd pain, however struggles mostly with lack of appetite When she is able to eat, food passes into stomach without difficult. No unusual bloating, nausea, vomiting or diarrhea.  Specifically feels difficulty is with desire to eat.      Allergies    No Known Allergies    Intolerances      Home Medications:    MEDICATIONS:  MEDICATIONS  (STANDING):  morphine  - Injectable 4 milliGRAM(s) IV Push Once  morphine  - Injectable 4 milliGRAM(s) IV Push Once    MEDICATIONS  (PRN):    PAST MEDICAL & SURGICAL HISTORY:    FAMILY HISTORY:    SOCIAL HISTORY:  T[place]ac[place]: [ ] Current, [ ] Former, [ ] Never; Pack Years:  Alcohol:  Illicit Drugs:    REVIEW OF SYSTEMS:  All other 10 review of systems is negative unless indicated above.    Vital Signs Last 24 Hrs  T(C): 36.7 (06 Mar 2023 12:53), Max: 36.7 (06 Mar 2023 06:49)  T(F): 98.1 (06 Mar 2023 12:53), Max: 98.1 (06 Mar 2023 06:49)  HR: 74 (06 Mar 2023 12:53) (74 - 886)  BP: 108/72 (06 Mar 2023 12:53) (108/72 - 126/82)  BP(mean): --  RR: 18 (06 Mar 2023 12:53) (16 - 18)  SpO2: 95% (06 Mar 2023 12:53) (95% - 100%)    Parameters below as of 06 Mar 2023 12:53  Patient On (Oxygen Delivery Method): room air  PHYSICAL EXAM:    General: No acute distress  Eyes: Anicteric sclerae, moist conjunctivae  HENT: Moist mucous membranes  Neck: Trachea midline, supple  Lungs: Normal respiratory effort and no intercostal retractions  Cardiovascular: RRR  Abdomen: Soft, mildly distended, L sided TTP; No rebound or guarding  Neurological: Alert and oriented x3  Skin: Warm and dry. No obvious rash    LABS:                        11.7   9.29  )-----------( 431      ( 06 Mar 2023 07:17 )             35.4     03-06    135  |  96  |  30<H>  ----------------------------<  120<H>  3.4<L>   |  24  |  0.95    Ca    10.0      06 Mar 2023 07:17    TPro  7.5  /  Alb  4.2  /  TBili  0.5  /  DBili  x   /  AST  56<H>  /  ALT  48<H>  /  AlkPhos  425<H>  03-06        PT/INR - ( 06 Mar 2023 07:17 )   PT: 13.8 sec;   INR: 1.16          PTT - ( 06 Mar 2023 07:17 )  PTT:31.0 sec    ACC: [id number] EXAM:  CT ABDOMEN AND PELVIS OC IC   ORDERED BY: GERARD ANTOINE     PROCEDURE DATE:  03/06/2023          INTERPRETATION:  CLINICAL INFORMATION: Metastatic small bowel cancer.   Concern for metastatic occluded obstruction.    COMPARISON: None.    CONTRAST/COMPLICATIONS:  IV Contrast: Isovue 370  90 cc administered   10 cc discarded  Oral Contrast: Gastroview  Complications: None reported at time of study completion    PROCEDURE:  CT of the Abdomen and Pelvis was performed.  Sagittal and coronal reformats were performed.    FINDINGS:  LOWER CHEST: Within normal limits.    LIVER: Multiple bilobar liver metastases, for example:  *  Segment 2, 1.9 x 1.4 cm  *  Segment 8, 2.9 x 2.7 cm  BILE DUCTS: Normal caliber.  GALLBLADDER: Within normal limits.  SPLEEN: Within normal limits.  PANCREAS: Within normal limits.  ADRENALS: Within normal limits.  KIDNEYS/URETERS: Tiny cysts and bilateral linear cortical hypodensities   and foci of scaring, probably postinflammatory.    BLADDER: Within normal limits.  REPRODUCTIVE ORGANS: Within normal limits    BOWEL/PERITONEUM: No bowel obstruction or significant gastric   distention.Left upper quadrant short segment of jejunal mural thickening   and probable ulceration (3, 62; 5, 41), probably reflecting known   malignancy.  RETROPERITONEUM/LYMPH NODES: Multifocal abdominal adenopathy, for example:  *  Periportal (3, 44), 2.8 x 2.3 cm  *  Portacaval (3, 49) 1.9 x 1.8 cm  *  Right common iliac (1.4 x 0.9 cm    BONES AND SOFT TISSUES: No suspicious lesion.    IMPRESSION:    1. No small bowel or gastric outlet obstruction.  2. Jejunal tumor,  samuel and multiple bilobar hepatic metastases.    --- End of Report ---          KEATON TOMAS MD; Attending Radiologist  This document has been electronically signed.  KEATON TOMAS MD; Attending Radiologist  This document has been electronically signed. Mar  6 2023  9:30AM

## 2023-03-06 NOTE — ED ADULT NURSE NOTE - CHIEF COMPLAINT QUOTE
Pt presents with c/o generalized "abdominal pain". Dx with small intestine CA in Feb. Referred to ER by PCP, Dr. LIDA Maldonado and Dr. ANDREE Bravo ([phone]); pt arrives with dictation by same to have "CT scan with PO/IV contrast, baseline blood work, EKG and chest xray" for admission purposes/sx for gastric stent. Pt presents with c/o generalized "abdominal pain". Dx with small intestine CA in Feb. Referred to ER by PCP, Dr. LIDA Maldonado and Dr. ANDREE Bravo ([phone]); pt arrives with dictation by same to have "CT scan with PO/IV contrast, baseline blood work, EKG and chest xray" for admission purposes/sx for gastric stent. Pt presents with c/o generalized "abdominal pain". Dx with small intestine CA in Feb. Referred to ER by PCP, Dr. LIDA Maldonado and Dr. ANDREE Bravo ([phone]); pt arrives with dictation by same to have "CT scan with PO/IV contrast, baseline blood work, EKG and chest xray" for admission purposes/sx for gastric stent.

## 2023-03-06 NOTE — H&P ADULT - PROBLEM SELECTOR PLAN 2
Not in exacerbation, breaths comfortably, Never intubated, no home O2. Hs of smoking for 50 years. on home albuterol as needed (usually once a day) and montelukast qd  - c/w home medication

## 2023-03-06 NOTE — ED ADULT NURSE NOTE - OBJECTIVE STATEMENT
Pt reports she was sent by her oncologist for a CT scan and baseline blood work to be admitted. Pt states she was dx with Small intestine CA in February and has since had pain and nausea. pt c/o pain to L side of abd, described as sharp/stabbing and constant without radiation. Pt c/o nausea without vomiting, no diarrhea, no urinary symptoms. Pt AOx4, ambulatory with steady gait.

## 2023-03-06 NOTE — H&P ADULT - ASSESSMENT
62F with PMHx of COPD, HTN, HLD, Anxiety and recent dx of jejunum ca with mets sent by Dr. Mckee for gastric outlet obstruction workup.

## 2023-03-06 NOTE — H&P ADULT - HISTORY OF PRESENT ILLNESS
62F with PMHx of COPD, HTN, HLD, Anxiety and recent dx of jejunum ca with mets sent by Dr. Mckee for gastric outlet obstruction workup.     In the ED:  V: T98.1, HR 86, /70, RR 16, 100% on RA  L: CBC wnl, K 3.4, ALKP 425, AST/ALT 56/48, lipase wnl.   EKG: NSR @71, [id number], w/o ischemic changes  CT A/P: No SBO or gastric outlet obstruction; Jejunal tumor, samuel and multiple bilobar hepatic metastases.  Intervention: Morphine 4mg IV x2, Zofran, NS 1L 62F with PMHx of COPD, HTN, HLD, Anxiety and recent dx of jejunum ca with mets sent by Dr. Mckee for gastric outlet obstruction workup.     In the ED:  V: T98.1, HR 86, /70, RR 16, 100% on RA  L: CBC wnl, K 3.4, ALKP 425, AST/ALT 56/48, lipase wnl.   EKG: NSR @71, [id number], w/o ischemic changes  CT A/P: No SBO or gastric outlet obstruction; Jejunal tumor, samuel and multiple bilobar hepatic metastases.  Intervention: Morphine 4mg IV x2, Zofran, NS 1L 62F with PMHx of COPD, HTN, HLD, Anxiety and recent dx of jejunum ca with mets sent by Dr. Mckee for gastric outlet obstruction workup.     In the ED:  V: T98.1, HR 86, /70, RR 16, 100% on RA  L: CBC wnl, K 3.4, ALKP 425, AST/ALT 56/48, lipase wnl.   EKG: NSR @71, [id number], w/o ischemic changes  CT A/P: No SBO or gastric outlet obstruction; Jejunal tumor, samuel and multiple bilobar hepatic metastases.  Intervention: Morphine 4mg IV x2, Zofran, NS 1L 62F with PMHx of COPD, HTN, HLD, Anxiety and recent dx of jejunum ca with mets sent by Dr. Mckee for gastric outlet obstruction workup. 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 On Friday     In the ED:  V: T98.1, HR 86, /70, RR 16, 100% on RA  L: CBC wnl, K 3.4, ALKP 425, AST/ALT 56/48, lipase wnl.   EKG: NSR @71, [id number], w/o ischemic changes  CT A/P: No SBO or gastric outlet obstruction; Jejunal tumor, samuel and multiple bilobar hepatic metastases.  Intervention: Morphine 4mg IV x2, Zofran, NS 1L 62F with PMHx of COPD, HTN, HLD, Anxiety and recent dx of jejunum ca with mets sent by Dr. Mckee for gastric outlet obstruction workup. 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 On Friday     In the ED:  V: T98.1, HR 86, /70, RR 16, 100% on RA  L: CBC wnl, K 3.4, ALKP 425, AST/ALT 56/48, lipase wnl.   EKG: NSR @71, [id number], w/o ischemic changes  CT A/P: No SBO or gastric outlet obstruction; Jejunal tumor, samuel and multiple bilobar hepatic metastases.  Intervention: Morphine 4mg IV x2, Zofran, NS 1L 62F with PMHx of COPD, HTN, HLD, Anxiety and recent dx of jejunum ca with mets sent by Dr. Mckee for gastric outlet obstruction workup. 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 On Friday     In the ED:  V: T98.1, HR 86, /70, RR 16, 100% on RA  L: CBC wnl, K 3.4, ALKP 425, AST/ALT 56/48, lipase wnl.   EKG: NSR @71, [id number], w/o ischemic changes  CT A/P: No SBO or gastric outlet obstruction; Jejunal tumor, samuel and multiple bilobar hepatic metastases.  Intervention: Morphine 4mg IV x2, Zofran, NS 1L 62F with PMHx of COPD, HTN, HLD, Anxiety and recent dx of jejunum ca with mets sent by Dr. Mckee for gastric outlet obstruction workup. 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 On Friday patient saw her oncologist, Dr. Mckee that recommended to start on clear liquid diet and to be admitted to [facility] on Monday for gastric obstruction w/u. Patient complains of 10/10 LUQ abdominal pain, worsen with food, sometime radiate to the left back area. Patient denies h/n/v/d, fever, chills, cp, palpitations, sob, leg swelling, rashes, dysuria, and changes in BM (last on Saturday).     In the ED:  V: T98.1, HR 86, /70, RR 16, 100% on RA  L: CBC wnl, K 3.4, ALKP 425, AST/ALT 56/48, lipase wnl.   EKG: NSR @71, [id number], w/o ischemic changes  CT A/P: No SBO or gastric outlet obstruction; Jejunal tumor, samuel and multiple bilobar hepatic metastases.  Intervention: Morphine 4mg IV x2, Zofran, NS 1L 62F with PMHx of COPD, HTN, HLD, Anxiety and recent dx of jejunum ca with mets sent by Dr. Mckee for gastric outlet obstruction workup. 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 On Friday patient saw her oncologist, Dr. Mckee that recommended to start on clear liquid diet and to be admitted to [facility] on Monday for gastric obstruction w/u. Patient complains of 10/10 LUQ abdominal pain, worsen with food, sometime radiate to the left back area. Patient denies h/n/v/d, fever, chills, cp, palpitations, sob, leg swelling, rashes, dysuria, and changes in BM (last on Saturday).     In the ED:  V: T98.1, HR 86, /70, RR 16, 100% on RA  L: CBC wnl, K 3.4, ALKP 425, AST/ALT 56/48, lipase wnl.   EKG: NSR @71, [id number], w/o ischemic changes  CT A/P: No SBO or gastric outlet obstruction; Jejunal tumor, samuel and multiple bilobar hepatic metastases.  Intervention: Morphine 4mg IV x2, Zofran, NS 1L 62F with PMHx of COPD, HTN, HLD, Anxiety and recent dx of jejunum ca with mets sent by Dr. Mckee for gastric outlet obstruction workup. 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 On Friday patient saw her oncologist, Dr. Mckee that recommended to start on clear liquid diet and to be admitted to [facility] on Monday for gastric obstruction w/u. Patient complains of 10/10 LUQ abdominal pain, worsen with food, sometime radiate to the left back area. Patient denies h/n/v/d, fever, chills, cp, palpitations, sob, leg swelling, rashes, dysuria, and changes in BM (last on Saturday).     In the ED:  V: T98.1, HR 86, /70, RR 16, 100% on RA  L: CBC wnl, K 3.4, ALKP 425, AST/ALT 56/48, lipase wnl.   EKG: NSR @71, [id number], w/o ischemic changes  CT A/P: No SBO or gastric outlet obstruction; Jejunal tumor, samuel and multiple bilobar hepatic metastases.  Intervention: Morphine 4mg IV x2, Zofran, NS 1L 62F with PMHx of COPD, HTN, HLD, Anxiety and recent dx of jejunum ca with mets sent by Dr. cMkee for gastric outlet obstruction workup. 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 On Friday patient saw her oncologist, Dr. Mckee that recommended to start on clear liquid diet and to be admitted to [facility] on Monday for gastric obstruction w/u. Patient complains of 10/10 LUQ abdominal pain, worsen with food, sometime radiate to the left back area. Patient denies h/n/v/d, fever, chills, cp, palpitations, sob, leg swelling, rashes, dysuria, and changes in BM (last on Saturday).     In the ED:  V: T98.1, HR 86, /70, RR 16, 100% on RA  L: CBC wnl, K 3.4, ALKP 425, AST/ALT 56/48, lipase wnl.   EKG: NSR @71, [id number], w/o ischemic changes  CT A/P: No SBO or gastric outlet obstruction; Jejunal tumor, samuel and multiple bilobar hepatic metastases.  Intervention: Morphine 4mg IV x2, Zofran, NS 1L  Consults: GI was consulted, Dr. Chris Maldonado planned for EGD tomorrow 62F with PMHx of COPD, HTN, HLD, Anxiety and recent dx of jejunum ca with mets sent by Dr. Mckee for gastric outlet obstruction workup. 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 On Friday patient saw her oncologist, Dr. Mckee that recommended to start on clear liquid diet and to be admitted to [facility] on Monday for gastric obstruction w/u. Patient complains of 10/10 LUQ abdominal pain, worsen with food, sometime radiate to the left back area. Patient denies h/n/v/d, fever, chills, cp, palpitations, sob, leg swelling, rashes, dysuria, and changes in BM (last on Saturday).     In the ED:  V: T98.1, HR 86, /70, RR 16, 100% on RA  L: CBC wnl, K 3.4, ALKP 425, AST/ALT 56/48, lipase wnl.   EKG: NSR @71, [id number], w/o ischemic changes  CT A/P: No SBO or gastric outlet obstruction; Jejunal tumor, samuel and multiple bilobar hepatic metastases.  Intervention: Morphine 4mg IV x2, Zofran, NS 1L  Consults: GI was consulted, Dr. Chris Maldonado planned for EGD tomorrow 62F with PMHx of COPD, HTN, HLD, Anxiety and recent dx of jejunum ca with mets sent by Dr. Mckee for gastric outlet obstruction workup. 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 On Friday patient saw her oncologist, Dr. Mckee that recommended to start on clear liquid diet and to be admitted to [facility] on Monday for gastric obstruction w/u. Patient complains of 10/10 LUQ abdominal pain, worsen with food, sometime radiate to the left back area. Patient denies h/n/v/d, fever, chills, cp, palpitations, sob, leg swelling, rashes, dysuria, and changes in BM (last on Saturday).     In the ED:  V: T98.1, HR 86, /70, RR 16, 100% on RA  L: CBC wnl, K 3.4, ALKP 425, AST/ALT 56/48, lipase wnl.   EKG: NSR @71, [id number], w/o ischemic changes  CT A/P: No SBO or gastric outlet obstruction; Jejunal tumor, samuel and multiple bilobar hepatic metastases.  Intervention: Morphine 4mg IV x2, Zofran, NS 1L  Consults: GI was consulted, Dr. Chris Maldonado planned for EGD tomorrow

## 2023-03-06 NOTE — H&P ADULT - PROBLEM SELECTOR PLAN 5
F: None   E: Replete as necessary K>4 Mg>2  N: Regular diet     DVT Prophylaxis: SCDs  GI prophylaxis: None   CODE STATUS: FULL

## 2023-03-06 NOTE — H&P ADULT - NSHPPHYSICALEXAM_GEN_ALL_CORE
Constitutional: NAD, comfortable in bed.  HEENT: NC/AT, PERRLA, EOMI, no conjunctival pallor or scleral icterus, MMM  Neck: Supple, no JVD  Respiratory: CTA B/L. No w/r/r.   Cardiovascular: RRR, normal S1 and S2, no m/r/g.   Gastrointestinal: +BS, soft NTND, no guarding or rebound tenderness, no palpable masses. RUQ Tenderness.  Extremities: wwp; no cyanosis, clubbing or edema.   Vascular: Pulses equal and strong throughout.   Neurological: AAOx3, no CN deficits, strength and sensation intact throughout.   Skin: No gross skin abnormalities or rashes

## 2023-03-06 NOTE — H&P ADULT - PROBLEM SELECTOR PLAN 1
Patient with recent diagnosis of jejunal Cr (2/10/23) Patient with recent diagnosis of jejunal Cr (2/10/23) with mets to liver and lymph nodes in the pelvic and retroperitoneal. Diagnosed at [facility]. Began chemo with capecitabine, gemcitabine and oxaliplatin. Port was placed on February. Sent by her oncologist Dr. Mckee to the ER due to a suspicion for gastric outlet obstruction, as patient was complaining of severe abdominal pain.   - f/u out patient hem/onc recs Patient with recent diagnosis of jejunal Cr (2/10/23) with mets to liver and lymph nodes in the pelvic and retroperitoneal. Diagnosed at [facility]. Began chemo with capecitabine, gemcitabine and oxaliplatin. Port was placed on February. Sent by her oncologist Dr. Mckee to the ER due to a suspicion for gastric outlet obstruction, as patient was complaining of severe abdominal pain.   - f/u out patient hem/onc recs Patient with recent diagnosis of jejunal Cr (2/10/23) with mets to liver and lymph nodes in the pelvic and retroperitoneal. Diagnosed at [facility]. Began chemo with capecitabine, gemcitabine and oxaliplatin. Port was placed on February. Sent by her oncologist Dr. Mckee to the ER due to a suspicion for gastric outlet obstruction, as patient was complaining of severe abdominal pain.   - f/u out patient hem/onc recs Patient with recent diagnosis of jejunal Cr (2/10/23) with mets to liver and lymph nodes in the pelvic and retroperitoneal. Diagnosed at [facility]. Began chemo with capecitabine, gemcitabine and oxaliplatin. Port was placed on February. Sent by her oncologist Dr. Mckee to the ER due to a suspicion for gastric outlet obstruction, as patient was complaining of severe abdominal pain.   - CT A/P: No SBO or gastric outlet obstruction  - Transaminitis iso known mets to liver     - f/u out patient hem/onc recs  - EGD by Sharmila tomorrow   - NPO after MN  - f/u gen surg recs Patient with recent diagnosis of jejunal Cr (2/10/23) with mets to liver and lymph nodes in the pelvic and retroperitoneal. Diagnosed at [facility]. Began chemo with capecitabine, gemcitabine and oxaliplatin. Port was placed on February. Sent by her oncologist Dr. Mckee to the ER due to a suspicion for gastric outlet obstruction, as patient was complaining of severe abdominal pain.   - CT A/P: No SBO or gastric outlet obstruction  - Transaminitis iso known mets to liver     - f/u out patient hem/onc recs  - EGD by Sharmila tomorrow   - NPO after MN  - f/u gen surg recs Patient with recent diagnosis of jejunal Cr (2/10/23) with mets to liver and lymph nodes in the pelvic and retroperitoneal. Diagnosed at [facility]. Began chemo with capecitabine, gemcitabine and oxaliplatin. Port was placed on February. Sent by her oncologist Dr. Mckee to the ER due to a suspicion for gastric outlet obstruction, as patient was complaining of severe abdominal pain.   - CT A/P: No SBO or gastric outlet obstruction  - Transaminitis iso known mets to liver     - f/u out patient hem/onc recs  - EGD by Sharmila tomorrow   - NPO after MN  - f/u gen surg recs Patient with recent diagnosis of jejunal Cr (2/10/23) with mets to liver and lymph nodes in the pelvic and retroperitoneal. Diagnosed at [facility]. Began chemo with capecitabine, gemcitabine and oxaliplatin. Port was placed on February. Sent by her oncologist Dr. Mckee to the ER due to a suspicion for gastric outlet obstruction, as patient was complaining of severe abdominal pain.   - CT A/P: No SBO or gastric outlet obstruction  - Transaminitis iso known mets to liver     - f/u out patient hem/onc recs  - EGD by Sharmila tomorrow   - NPO after MN  - f/u gen surg recs by Dr. Bravo Patient with recent diagnosis of jejunal Cr (2/10/23) with mets to liver and lymph nodes in the pelvic and retroperitoneal. Diagnosed at [facility]. Began chemo with capecitabine, gemcitabine and oxaliplatin. Port was placed on February. Sent by her oncologist Dr. Mckee to the ER due to a suspicion for gastric outlet obstruction, as patient was complaining of severe abdominal pain.   - CT A/P: No SBO or gastric outlet obstruction  - Transaminitis iso known mets to liver     - f/u out patient hem/onc recs  - EGD by Sharmila tomorrow   - NPO after MN  - f/u gen surg recs by Dr. Bravo Patient with recent diagnosis of jejunal Cr (2/10/23) with mets to liver and lymph nodes in the pelvic and retroperitoneal. Diagnosed at [facility]. Began chemo with capecitabine, gemcitabine and oxaliplatin. Port was placed on February. Sent by her oncologist Dr. Mckee to the ER due to a suspicion for gastric outlet obstruction, as patient was complaining of severe abdominal pain.   - CT A/P: No SBO or gastric outlet obstruction  - Transaminitis iso known mets to liver     - f/u out patient hem/onc recs  - EGD by Sharmila tomorrow   - NPO after MN  - f/u gen surg recs by Dr. Bravo

## 2023-03-06 NOTE — CONSULT NOTE ADULT - SUBJECTIVE AND OBJECTIVE BOX
ID: This is a 62-yo female with COPD, HTN, HLD, anxiety, and newly diagnosed metastatic jejunal adenocarcinoma presenting with in referral from oncologist for impending gastric outlet obstruction (GOO).      HPI:   She feels relatively well and is slightly confused about need for admission. She was told she is not currently obstructed. She denies current nausea/vomiting but notes one episode of nausea with bumping in the carride over to the hospital. She dnies current abdominal pain but has had sporadic discomforts and inability to tolerate oral intake well. She is passing gas normally and had a normal BM yesterday.     She was initially admitted to medical service but transfer to our service has since been coordinated. CT Scan shows no overt obstruction from the jejunal mass. GI evaluated patient and is planning for endoscopy tomorrow, potentially.     - PMH: COPD, HTN, HLD, anxiety, and newly diagnosed metastatic jejunal adenocarcinoma   - PSH: no major abdominal surgeries.  - Med: Sanax 0.5 PRN, Quetiapine qhs, MOntelukast daily.  - All: NKDA      PAST MEDICAL & SURGICAL HISTORY:  History of COPD      HTN (hypertension)      Small bowel carcinoma      Anxiety          REVIEW OF SYSTEMS    General: no F/C  Neuro: No seizures, focal neurologic symptoms  Psych: No mood changes  CV: No CP, SOB  Pulm: No SOB, coughing  GI: No N/V, abdominal pain. No diarrhea.   : No dysuria  Heme: No weakness, easy bruising.  Skin: No rashes  Lymph: No swelling    MEDICATIONS  (STANDING):  amLODIPine   Tablet 10 milliGRAM(s) Oral daily  fentaNYL   Patch  12 MICROgram(s)/Hr 1 Patch Transdermal every 72 hours  heparin   Injectable 5000 Unit(s) SubCutaneous every 8 hours  hydrochlorothiazide 12.5 milliGRAM(s) Oral every 24 hours  lactated ringers. 1000 milliLiter(s) (115 mL/Hr) IV Continuous <Continuous>  lisinopril 40 milliGRAM(s) Oral every 24 hours  montelukast 10 milliGRAM(s) Oral daily  pantoprazole    Tablet 40 milliGRAM(s) Oral before breakfast  potassium chloride   Powder 40 milliEquivalent(s) Oral once  QUEtiapine 150 milliGRAM(s) Oral at bedtime  sertraline 50 milliGRAM(s) Oral daily  sucralfate 1 Gram(s) Oral every 12 hours    MEDICATIONS  (PRN):  acetaminophen     Tablet .. 650 milliGRAM(s) Oral every 6 hours PRN Temp greater or equal to 38C (100.4F), Mild Pain (1 - 3)  albuterol    90 MICROgram(s) HFA Inhaler 2 Puff(s) Inhalation every 6 hours PRN Shortness of Breath and/or Wheezing  ALPRAZolam 0.5 milliGRAM(s) Oral three times a day PRN Anxiaty  ondansetron Injectable 4 milliGRAM(s) IV Push every 8 hours PRN Nausea and/or Vomiting      Allergies    No Known Allergies    Intolerances        SOCIAL HISTORY:    FAMILY HISTORY:      Vital Signs Last 24 Hrs  T(C): 36.4 (06 Mar 2023 17:12), Max: 36.7 (06 Mar 2023 06:49)  T(F): 97.5 (06 Mar 2023 17:12), Max: 98.1 (06 Mar 2023 06:49)  HR: 64 (06 Mar 2023 17:12) (64 - 886)  BP: 133/75 (06 Mar 2023 17:12) (108/72 - 137/76)  BP(mean): --  RR: 18 (06 Mar 2023 17:12) (16 - 18)  SpO2: 92% (06 Mar 2023 17:12) (92% - 100%)    Parameters below as of 06 Mar 2023 17:12  Patient On (Oxygen Delivery Method): room air        PHYSICAL EXAMINATION    Gen: Non-toxic-appearing 62-yo female in NAD  Neurologic: AAOx4; moving all extremities equally.   CV: Normal rate, regular rhythm  Pulm: Breathing comfortably  Abd: Soft, non-distended; No TTP throughout. Mildly distended in epigastrium with tympany to percussion.   : No Ramírez  Skin: No rashes  Extremities: No edema.  Psychiatric: Interacting normally    LABS:                        11.7   9.29  )-----------( 431      ( 06 Mar 2023 07:17 )             35.4     03-06    135  |  96  |  30<H>  ----------------------------<  120<H>  3.4<L>   |  24  |  0.95    Ca    10.0      06 Mar 2023 07:17  Phos  3.8     03-06  Mg     1.9     03-06    TPro  7.5  /  Alb  4.2  /  TBili  0.5  /  DBili  x   /  AST  56<H>  /  ALT  48<H>  /  AlkPhos  425<H>  03-06    PT/INR - ( 06 Mar 2023 07:17 )   PT: 13.8 sec;   INR: 1.16          PTT - ( 06 Mar 2023 07:17 )  PTT:31.0 sec      RADIOLOGY & ADDITIONAL STUDIES:  CT A/P  IMPRESSION:    1. No small bowel or gastric outlet obstruction.  2. Jejunal tumor, samuel and multiple bilobar hepatic metastases.     ID: This is a 62-yo female with COPD, HTN, HLD, anxiety, and newly diagnosed metastatic jejunal adenocarcinoma presenting with in referral from oncologist for impending gastric outlet obstruction (GOO).      HPI:   She feels relatively well and is slightly confused about need for admission. She was told she is not currently obstructed. She denies current nausea/vomiting but notes one episode of nausea with bumping in the carride over to the hospital. She dnies current abdominal pain but has had sporadic discomforts and inability to tolerate oral intake well. She is passing gas normally and had a normal BM yesterday.     She was initially admitted to medical service but transfer to our service has since been coordinated. CT Scan shows no overt obstruction from the jejunal mass. GI evaluated patient and is planning for endoscopy tomorrow, potentially.     - PMH: COPD, HTN, HLD, anxiety, and newly diagnosed metastatic jejunal adenocarcinoma   - PSH: no major abdominal surgeries.  - Med: Xanax 0.5 PRN, Quetiapine qhs. SErtraline.  Albuterol PRN. Montelukast daily. Amlodine-Benazepril, HCTZ.   - All: NKDA      PAST MEDICAL & SURGICAL HISTORY:  History of COPD      HTN (hypertension)      Small bowel carcinoma      Anxiety          REVIEW OF SYSTEMS    General: no F/C  Neuro: No seizures, focal neurologic symptoms  Psych: No mood changes  CV: No CP, SOB  Pulm: No SOB, coughing  GI: No N/V, abdominal pain. No diarrhea.   : No dysuria  Heme: No weakness, easy bruising.  Skin: No rashes  Lymph: No swelling    MEDICATIONS  (STANDING):  amLODIPine   Tablet 10 milliGRAM(s) Oral daily  fentaNYL   Patch  12 MICROgram(s)/Hr 1 Patch Transdermal every 72 hours  heparin   Injectable 5000 Unit(s) SubCutaneous every 8 hours  hydrochlorothiazide 12.5 milliGRAM(s) Oral every 24 hours  lactated ringers. 1000 milliLiter(s) (115 mL/Hr) IV Continuous <Continuous>  lisinopril 40 milliGRAM(s) Oral every 24 hours  montelukast 10 milliGRAM(s) Oral daily  pantoprazole    Tablet 40 milliGRAM(s) Oral before breakfast  potassium chloride   Powder 40 milliEquivalent(s) Oral once  QUEtiapine 150 milliGRAM(s) Oral at bedtime  sertraline 50 milliGRAM(s) Oral daily  sucralfate 1 Gram(s) Oral every 12 hours    MEDICATIONS  (PRN):  acetaminophen     Tablet .. 650 milliGRAM(s) Oral every 6 hours PRN Temp greater or equal to 38C (100.4F), Mild Pain (1 - 3)  albuterol    90 MICROgram(s) HFA Inhaler 2 Puff(s) Inhalation every 6 hours PRN Shortness of Breath and/or Wheezing  ALPRAZolam 0.5 milliGRAM(s) Oral three times a day PRN Anxiaty  ondansetron Injectable 4 milliGRAM(s) IV Push every 8 hours PRN Nausea and/or Vomiting      Allergies    No Known Allergies    Intolerances        SOCIAL HISTORY:    FAMILY HISTORY:      Vital Signs Last 24 Hrs  T(C): 36.4 (06 Mar 2023 17:12), Max: 36.7 (06 Mar 2023 06:49)  T(F): 97.5 (06 Mar 2023 17:12), Max: 98.1 (06 Mar 2023 06:49)  HR: 64 (06 Mar 2023 17:12) (64 - 886)  BP: 133/75 (06 Mar 2023 17:12) (108/72 - 137/76)  BP(mean): --  RR: 18 (06 Mar 2023 17:12) (16 - 18)  SpO2: 92% (06 Mar 2023 17:12) (92% - 100%)    Parameters below as of 06 Mar 2023 17:12  Patient On (Oxygen Delivery Method): room air        PHYSICAL EXAMINATION    Gen: Non-toxic-appearing 62-yo female in NAD  Neurologic: AAOx4; moving all extremities equally.   CV: Normal rate, regular rhythm  Pulm: Breathing comfortably  Abd: Soft, non-distended; No TTP throughout. Mildly distended in epigastrium with tympany to percussion.   : No Ramírez  Skin: No rashes  Extremities: No edema.  Psychiatric: Interacting normally    LABS:                        11.7   9.29  )-----------( 431      ( 06 Mar 2023 07:17 )             35.4     03-06    135  |  96  |  30<H>  ----------------------------<  120<H>  3.4<L>   |  24  |  0.95    Ca    10.0      06 Mar 2023 07:17  Phos  3.8     03-06  Mg     1.9     03-06    TPro  7.5  /  Alb  4.2  /  TBili  0.5  /  DBili  x   /  AST  56<H>  /  ALT  48<H>  /  AlkPhos  425<H>  03-06    PT/INR - ( 06 Mar 2023 07:17 )   PT: 13.8 sec;   INR: 1.16          PTT - ( 06 Mar 2023 07:17 )  PTT:31.0 sec      RADIOLOGY & ADDITIONAL STUDIES:  CT A/P  IMPRESSION:    1. No small bowel or gastric outlet obstruction.  2. Jejunal tumor, samuel and multiple bilobar hepatic metastases.

## 2023-03-06 NOTE — ED ADULT NURSE NOTE - NSIMPLEMENTINTERV_GEN_ALL_ED
Implemented All Universal Safety Interventions:  [place] to call system. Call bell, personal items and telephone within reach. Instruct patient to call for assistance. Room bathroom lighting operational. Non-slip footwear when patient is off stretcher. Physically safe environment: no spills, clutter or unnecessary equipment. Stretcher in lowest position, wheels locked, appropriate side rails in place. Implemented All Universal Safety Interventions:  [place] to call system. Call bell, personal items and telephone within reach. Instruct patient to call for assistance. Room bathroom lighting operational. Non-slip footwear when patient is off stretcher. Physically safe environment: no spills, clutter or unnecessary equipment. Stretcher in lowest position, wheels locked, appropriate side rails in place. Implemented All Universal Safety Interventions:  [place] to call system. Call bell, personal items and telephone within reach. Instruct patient to call for assistance. Room bathroom lighting operational. Non-slip footwear when patient is off stretcher. Physically safe environment: no spills, clutter or unnecessary equipment. Stretcher in lowest position, wheels locked, appropriate side rails in place.

## 2023-03-07 NOTE — PATIENT PROFILE ADULT - FALL HARM RISK - UNIVERSAL INTERVENTIONS
Bed in lowest position, wheels locked, appropriate side rails in place/Call bell, personal items and telephone in reach/Instruct patient to call for assistance before getting out of bed or chair/Non-slip footwear when patient is out of bed/[place] to call system/Physically safe environment - no spills, clutter or unnecessary equipment/Purposeful Proactive Rounding/Room/bathroom lighting operational, light cord in reach Bed in lowest position, wheels locked, appropriate side rails in place/Call bell, personal items and telephone in reach/Instruct patient to call for assistance before getting out of bed or chair/Non-slip footwear when patient is out of bed/[place] to call system/Physically safe environment - no spills, clutter or unnecessary equipment/Purposeful Proactive Rounding/Room/bathroom lighting operational, light cord in reach Bed in lowest position, wheels locked, appropriate side rails in place/Call bell, personal items and telephone in reach/Instruct patient to call for assistance before getting out of bed or chair/Non-slip footwear when patient is out of bed/[place] to call system/Physically safe environment - no spills, clutter or unnecessary equipment/Purposeful Proactive Rounding/Room/bathroom lighting operational, light cord in reach

## 2023-03-07 NOTE — PATIENT PROFILE ADULT - NSPROGENPREVTRANSF_GEN_A_NUR
EMERGENCY DEPARTMENT HISTORY AND PHYSICAL EXAM      Date: 6/17/2021  Patient Name: Dayne Metcalf    History of Presenting Illness     Chief Complaint   Patient presents with    Cough     Mom reports acute onset about 3 days ago. History Provided By: Mom    HPI: Dayne Metcalf, 10 y.o. female, previously healthy, presents to the ED with cc of several days of URI symptoms. Mom reports patient likely contracted current illness after staying with her aunt who is also sick with similar symptoms. States that she has been experiencing an intermittent cough, along with headaches. States that she has been giving the patient tylenol for the headaches, which seem to help. Patient currently denies any headaches at this time. Denies patient ever experiencing any signs of respiratory distress. Patient has not had any fevers. No history of wheezing or asthma. She is otherwise tolerating p.o. as usual.  No changes in behavior. PCP: Rosario Louis MD    No current facility-administered medications on file prior to encounter. Current Outpatient Medications on File Prior to Encounter   Medication Sig Dispense Refill    mupirocin calcium (Bactroban) 2 % topical cream Apply  to affected area two (2) times a day. 15 g 0    albuterol (PROVENTIL VENTOLIN) 2.5 mg /3 mL (0.083 %) nebu 3 mL by Nebulization route every four (4) hours as needed for Wheezing. 30 Nebule 0       Past History     Past Medical History:  Past Medical History:   Diagnosis Date    Congenital blocked tear duct of left eye 2016    Eye drainage 2016    Hernia     Ill-defined condition     Chronic constipation    Infantile seborrheic dermatitis 2016       Past Surgical History:  No past surgical history on file.     Family History:  Family History   Problem Relation Age of Onset    Anemia Mother         Copied from mother's history at birth   Tali Kaylan Asthma Mother         Copied from mother's history at birth   Tali Kaylan Eczema Mother     Eczema Father        Social History:  Social History     Tobacco Use    Smoking status: Never Smoker   Substance Use Topics    Alcohol use: Not on file    Drug use: Not on file       Allergies: Allergies   Allergen Reactions    Peanut Anaphylaxis         Review of Systems   Review of Systems   Constitutional: Negative for chills and fever. HENT: Negative for congestion, ear pain, sore throat and trouble swallowing. Eyes: Negative for visual disturbance. Respiratory: Positive for cough. Negative for shortness of breath. Cardiovascular: Negative for chest pain and palpitations. Gastrointestinal: Negative for abdominal pain, nausea and vomiting. Genitourinary: Negative for decreased urine volume, dysuria and hematuria. Musculoskeletal: Negative for arthralgias and myalgias. Skin: Negative for wound. Allergic/Immunologic: Negative for immunocompromised state. Neurological: Positive for headaches. Negative for dizziness, seizures, syncope, weakness and light-headedness. Hematological: Does not bruise/bleed easily. Physical Exam   Physical Exam  Vitals and nursing note reviewed. Constitutional:       General: She is active. She is not in acute distress. Appearance: Normal appearance. She is well-developed and normal weight. She is not toxic-appearing. HENT:      Head: Normocephalic and atraumatic. Right Ear: Tympanic membrane normal.      Left Ear: Tympanic membrane normal.      Nose: Nose normal.      Mouth/Throat:      Mouth: Mucous membranes are moist.      Pharynx: Oropharynx is clear. Eyes:      Extraocular Movements: Extraocular movements intact. Conjunctiva/sclera: Conjunctivae normal.      Pupils: Pupils are equal, round, and reactive to light. Cardiovascular:      Rate and Rhythm: Normal rate and regular rhythm. Pulses: Normal pulses.    Pulmonary:      Effort: Pulmonary effort is normal. No respiratory distress, nasal flaring or retractions. Breath sounds: Normal breath sounds. No stridor or decreased air movement. No wheezing, rhonchi or rales. Abdominal:      General: Abdomen is flat. Bowel sounds are normal. There is no distension. Palpations: Abdomen is soft. Tenderness: There is no abdominal tenderness. Musculoskeletal:         General: No swelling, tenderness, deformity or signs of injury. Normal range of motion. Cervical back: Normal range of motion and neck supple. No rigidity or tenderness. Skin:     General: Skin is warm and dry. Capillary Refill: Capillary refill takes less than 2 seconds. Findings: No rash. Neurological:      General: No focal deficit present. Mental Status: She is alert and oriented for age. Cranial Nerves: No cranial nerve deficit. Sensory: No sensory deficit. Motor: No weakness. Coordination: Coordination normal.      Gait: Gait normal.   Psychiatric:         Mood and Affect: Mood normal.         Behavior: Behavior normal.         Diagnostic Study Results     Labs -     Recent Results (from the past 24 hour(s))   SARS-COV-2    Collection Time: 06/17/21  1:57 PM   Result Value Ref Range    SARS-CoV-2 Please find results under separate order         Radiologic Studies -   No orders to display     CT Results  (Last 48 hours)    None        CXR Results  (Last 48 hours)    None          Medical Decision Making   I am the first provider for this patient. I reviewed the vital signs, available nursing notes, past medical history, past surgical history, family history and social history. Vital Signs-Reviewed the patient's vital signs. Patient Vitals for the past 24 hrs:   Temp Pulse Resp SpO2   06/17/21 1248 97.5 °F (36.4 °C) 125 22 100 %     Records Reviewed: Nursing Notes and Old Medical Records    Provider Notes (Medical Decision Making):   Vitals are stable.   Patient is afebrile, nontoxic on exam.  Behaving age-appropriate, smiling, interactive, in no acute distress. Mucous membranes are moist, cap refill is within normal limits. HEENT exam unremarkable. Lung auscultation reveal clear breath sounds bilaterally without wheezing, rales, or rhonchi. Abdomen is soft, nondistended, nontender to palpation throughout. Patient is moving all 4 extremities equally and spontaneously, and demonstrates a steady gait. Will send discharge Covid swab, which will result in 2 days time. No additional labs or imaging necessary for this very well-appearing, interactive, and nontoxic child with stable vital signs. Most likely diagnosis is viral URI. Advised to follow-up with regular pediatrician. Discharged in stable condition. Andreas Bermeo MD      Disposition:  Discharge      DISCHARGE PLAN:  1. Discharge Medication List as of 6/17/2021  3:42 PM        2. Follow-up Information     Follow up With Specialties Details Why Contact Info    Benito Herrera MD Pediatric Medicine   [employer]  [phone]          3. Return to ED if worse     Diagnosis     Clinical Impression:   1. Viral URI        Attestations:    Andreas Bermeo MD    Please note that this dictation was completed with [employer], the computer voice recognition software. Quite often unanticipated grammatical, syntax, homophones, and other interpretive errors are inadvertently transcribed by the computer software. Please disregard these errors. Please excuse any errors that have escaped final proofreading. Thank you. no

## 2023-03-07 NOTE — PROGRESS NOTE ADULT - ASSESSMENT
62-yo female with COPD, HTN, HLD, anxiety, and newly diagnosed metastatic jejunal adenocarcinoma presenting with in referral from oncologist for impending GOO (planned for chemo, has Port in place). Planned for EGD vs OR.    NPO/IVF  Pain/Nausea control  continue home montelukast. Xanax PRN., amlodipine.   GI recs  SCD/IS/Holding SQH

## 2023-03-07 NOTE — CHART NOTE - NSCHARTNOTEFT_GEN_A_CORE
EGD performed with attending, Dr. Constantino    Findings as noted:  -Grade 1 esophagitis with no bleeding was seen in the gastroesophageal junction, compatible with non-erosive esophagitis.  -Diffuse erythema of the mucosa with no bleeding was noted in the whole stomach. These findings are compatible with non-erosive gastritis. Multiple cold forceps biopsies were performed for histology.  -Normal mucosa was noted in the whole examined duodenum.  -Abnormal mucosa with intrinsic stenosis with contact bleeding was noted in the proximal jejunum.    Recommendations:  -Will follow up pathology  -Remainder of plan per surgical team    Gastroenterology service will sign off  Case discussed with attending physician  Please recall as needed with any GI related questions    Thank you for this consult.     Jamil Guerrero DO  Gastroenterology Fellow  Pager: [phone] EGD performed with attending, Dr. Constantino    Findings as noted:  -Grade 1 esophagitis with no bleeding was seen in the gastroesophageal junction, compatible with non-erosive esophagitis.  -Diffuse erythema of the mucosa with no bleeding was noted in the whole stomach. These findings are compatible with non-erosive gastritis. Multiple cold forceps biopsies were performed for histology.  -Normal mucosa was noted in the whole examined duodenum.  -Abnormal mucosa with intrinsic stenosis with contact bleeding was noted in the proximal jejunum.    Recommendations:  -Will follow up pathology  -Remainder of plan per surgical team    Gastroenterology service will sign off  Case discussed with attending physician  Please recall as needed with any GI related questions    Thank you for this consult.     Jamil Guerrero DO  Gastroenterology Fellow  Pager: [phone] EGD performed with attending, Dr. Constantino    Findings as noted:  -Grade 1 esophagitis with no bleeding was seen in the gastroesophageal junction, compatible with non-erosive esophagitis.  -Diffuse erythema of the mucosa with no bleeding was noted in the whole stomach. These findings are compatible with non-erosive gastritis. Multiple cold forceps biopsies were performed for histology.  -Normal mucosa was noted in the whole examined duodenum.  -Abnormal mucosa with intrinsic stenosis with contact bleeding was noted in the proximal jejunum.    Recommendations:  -Will follow up pathology  -Remainder of plan per surgical team    Gastroenterology service will sign off  Case discussed with attending physician  Please recall as needed with any GI related questions    Thank you for this consult.     Jamil Guerrero DO  Gastroenterology Fellow  Pager: [phone] EGD performed with attending, Dr. Constantino    Findings as noted:  -Grade 1 esophagitis with no bleeding was seen in the gastroesophageal junction, compatible with non-erosive esophagitis.  -Diffuse erythema of the mucosa with no bleeding was noted in the whole stomach. These findings are compatible with non-erosive gastritis. Multiple cold forceps biopsies were performed for histology.  -Normal mucosa was noted in the whole examined duodenum.  -Abnormal mucosa with intrinsic stenosis with contact bleeding was noted in the proximal jejunum.    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     Jamil Guerrero DO  Gastroenterology Fellow  Pager: [phone] EGD performed with attending, Dr. Constantino    Findings as noted:  -Grade 1 esophagitis with no bleeding was seen in the gastroesophageal junction, compatible with non-erosive esophagitis.  -Diffuse erythema of the mucosa with no bleeding was noted in the whole stomach. These findings are compatible with non-erosive gastritis. Multiple cold forceps biopsies were performed for histology.  -Normal mucosa was noted in the whole examined duodenum.  -Abnormal mucosa with intrinsic stenosis with contact bleeding was noted in the proximal jejunum.    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     Jamil Guerrero DO  Gastroenterology Fellow  Pager: [phone] EGD performed with attending, Dr. Constantino    Findings as noted:  -Grade 1 esophagitis with no bleeding was seen in the gastroesophageal junction, compatible with non-erosive esophagitis.  -Diffuse erythema of the mucosa with no bleeding was noted in the whole stomach. These findings are compatible with non-erosive gastritis. Multiple cold forceps biopsies were performed for histology.  -Normal mucosa was noted in the whole examined duodenum.  -Abnormal mucosa with intrinsic stenosis with contact bleeding was noted in the proximal jejunum.    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     Jamil Guerrero DO  Gastroenterology Fellow  Pager: [phone]

## 2023-03-07 NOTE — PROGRESS NOTE ADULT - SUBJECTIVE AND OBJECTIVE BOX
SUBJECTIVE:   Patient seen and examined on am rounds. +F. No new complaints. -n-v-cp-sob.    Vital Signs Last 24 Hrs  T(C): 36.7 (07 Mar 2023 08:10), Max: 36.7 (06 Mar 2023 12:53)  T(F): 98.1 (07 Mar 2023 04:56), Max: 98.1 (06 Mar 2023 12:53)  HR: 62 (07 Mar 2023 08:10) (62 - 81)  BP: 132/72 (07 Mar 2023 08:10) (108/72 - 150/76)  BP(mean): --  RR: 18 (07 Mar 2023 08:10) (18 - 18)  SpO2: 94% (07 Mar 2023 08:10) (92% - 99%)    Parameters below as of 07 Mar 2023 04:56  Patient On (Oxygen Delivery Method): room air        I&O's Summary    06 Mar 2023 07:01  -  07 Mar 2023 07:00  --------------------------------------------------------  IN: 460 mL / OUT: 0 mL / NET: 460 mL        Physical Exam:  General Appearance: Appears well, NAD  Pulmonary: Nonlabored breathing, no respiratory distress  Cardiovascular: NSR  Abdomen: Soft, nondistended, mild RUQ tenderness.  Extremities: WWP, SCD's in place     LABS:                        11.7   9.29  )-----------( 431      ( 06 Mar 2023 07:17 )             35.4     03-06    135  |  96  |  30<H>  ----------------------------<  120<H>  3.4<L>   |  24  |  0.95    Ca    10.0      06 Mar 2023 07:17  Phos  3.8     03-06  Mg     1.9     03-06    TPro  7.5  /  Alb  4.2  /  TBili  0.5  /  DBili  x   /  AST  56<H>  /  ALT  48<H>  /  AlkPhos  425<H>  03-06    PT/INR - ( 06 Mar 2023 07:17 )   PT: 13.8 sec;   INR: 1.16          PTT - ( 06 Mar 2023 07:17 )  PTT:31.0 sec

## 2023-03-08 NOTE — PROGRESS NOTE ADULT - SUBJECTIVE AND OBJECTIVE BOX
Procedure: Gastrojejunostomy, laparoscopic  Surgeon: Dr. Bravo    S: Pt seen and examined bedside. She states that her pain is well controlled on medication. She reports some discomfort  Denies CP, SOB, BACON, calf tenderness or edema, nausea, emesis, or fevers.     O:  T(C): 36.3 (03-08-23 @ 16:38), Max: 36.8 (03-08-23 @ 14:58)  T(F): 97.4 (03-08-23 @ 16:38), Max: 98.3 (03-08-23 @ 14:58)  HR: 71 (03-08-23 @ 16:38) (71 - 73)  BP: 167/80 (03-08-23 @ 16:38) (155/82 - 167/80)  RR: 18 (03-08-23 @ 16:38) (18 - 18)  SpO2: 94% (03-08-23 @ 16:38) (94% - 94%)  Wt(kg): --                        11.2   9.39  )-----------( 415      ( 08 Mar 2023 12:36 )             33.8     03-08    135  |  96  |  11  ----------------------------<  147<H>  4.0   |  23  |  0.77    Ca    9.4      08 Mar 2023 12:36  Phos  3.8     03-08  Mg     1.7     03-08    TPro  6.9  /  Alb  3.9  /  TBili  0.4  /  DBili  x   /  AST  42<H>  /  ALT  34  /  AlkPhos  353<H>  03-08      Gen: NAD, resting comfortably in bed, NGT in place - clamped while awaiting CXR for placement   C/V: NSR  Pulm: Nonlabored breathing, no respiratory distress  Abd: soft, appropriately tender Incision:c/d/i  Extrem: WWP, no calf edema, SCDs in place      A/P: 62-yo female with COPD, HTN, HLD, anxiety, and newly diagnosed metastatic jejunal adenocarcinoma presenting with in referral from oncologist for impending GOO (planned for chemo, has Port in place). S/p EGD 3/7 showing esophagitis. s/p OR for laparoscopic gastrojejunostomy on 3/8    NPO/IVF  Pain/nausea control prn  NGT to LIWS  continue home montelukast. Xanax PRN., amlodipine.   GI recs  SCD/IS/SQH

## 2023-03-08 NOTE — PROVIDER CONTACT NOTE (CRITICAL VALUE NOTIFICATION) - TEST AND RESULT REPORTED:
Drysol Pregnancy And Lactation Text: This medication is considered safe during pregnancy and breast feeding. Hepatitis C

## 2023-03-08 NOTE — PRE-ANESTHESIA EVALUATION ADULT - NSANTHOSAYNRD_GEN_A_CORE
No. ROMEO screening performed.  STOP BANG Legend: 0-2 = LOW Risk; 3-4 = INTERMEDIATE Risk; 5-8 = HIGH Risk
No. [place] screening performed.  STOP BANG Legend: 0-2 = LOW Risk; 3-4 = INTERMEDIATE Risk; 5-8 = HIGH Risk

## 2023-03-08 NOTE — PROGRESS NOTE ADULT - SUBJECTIVE AND OBJECTIVE BOX
INTERVAL HPI/OVERNIGHT EVENTS: VALENTINO. NPO. +hep c, ekg in chart wnl    STATUS POST:  EGD 3/7 showing esophagitis    SUBJECTIVE: Pt seen and examined at bedside this am by surgery team. No acute complaints. Pain well controlled. Denies f/n/v/cp/sob.    MEDICATIONS  (STANDING):  amLODIPine   Tablet 10 milliGRAM(s) Oral daily  fentaNYL   Patch  12 MICROgram(s)/Hr 1 Patch Transdermal every 72 hours  heparin   Injectable 5000 Unit(s) SubCutaneous every 8 hours  lactated ringers. 1000 milliLiter(s) (115 mL/Hr) IV Continuous <Continuous>  montelukast 10 milliGRAM(s) Oral daily  pantoprazole    Tablet 40 milliGRAM(s) Oral before breakfast  potassium chloride   Powder 40 milliEquivalent(s) Oral once  QUEtiapine 150 milliGRAM(s) Oral at bedtime  sertraline 50 milliGRAM(s) Oral daily  sucralfate 1 Gram(s) Oral every 12 hours    MEDICATIONS  (PRN):  acetaminophen     Tablet .. 650 milliGRAM(s) Oral every 6 hours PRN Temp greater or equal to 38C (100.4F), Mild Pain (1 - 3)  albuterol    90 MICROgram(s) HFA Inhaler 2 Puff(s) Inhalation every 6 hours PRN Shortness of Breath and/or Wheezing  ALPRAZolam 0.5 milliGRAM(s) Oral three times a day PRN Anxiety  ondansetron Injectable 4 milliGRAM(s) IV Push every 8 hours PRN Nausea and/or Vomiting    Vital Signs Last 24 Hrs  T(C): 36.9 (08 Mar 2023 07:42), Max: 36.9 (07 Mar 2023 13:24)  T(F): 98.4 (08 Mar 2023 04:56), Max: 98.5 (07 Mar 2023 13:24)  HR: 64 (08 Mar 2023 07:42) (60 - 72)  BP: 139/78 (08 Mar 2023 07:42) (123/80 - 156/77)  BP(mean): --  RR: 17 (08 Mar 2023 07:42) (16 - 17)  SpO2: 94% (08 Mar 2023 07:42) (93% - 98%)    Parameters below as of 08 Mar 2023 04:56  Patient On (Oxygen Delivery Method): room air    PHYSICAL EXAM:    Constitutional: A&Ox3, NAD    Respiratory: non labored breathing, no respiratory distress    Cardiovascular: NSR, RRR    Gastrointestinal: abdomen soft, nd, mild RUQ tenderness. No rebound or guarding     Extremities: wwp, no calf tenderness or edema. SCDs in place    I&O's Detail    07 Mar 2023 07:01  -  08 Mar 2023 07:00  --------------------------------------------------------  IN:    Lactated Ringers: 1725 mL  Total IN: 1725 mL    OUT:    Oral Fluid: 0 mL    Voided (mL): 1000 mL  Total OUT: 1000 mL    Total NET: 725 mL          LABS:                        11.3   8.05  )-----------( 408      ( 08 Mar 2023 05:30 )             34.0     03-08    140  |  100  |  9   ----------------------------<  88  4.3   |  27  |  0.71    Ca    9.8      08 Mar 2023 05:30  Phos  3.0     03-08  Mg     2.0     03-08      PT/INR - ( 08 Mar 2023 05:30 )   PT: 14.0 sec;   INR: 1.17          PTT - ( 08 Mar 2023 05:30 )  PTT:31.1 sec      RADIOLOGY & ADDITIONAL STUDIES:

## 2023-03-08 NOTE — PRE-ANESTHESIA EVALUATION ADULT - NSANTHPEFT_GEN_ALL_CORE
NAD, no resp distress
General: Appearance is consistent with chronological age. No abnormal facies.  Constitutional: Alert and in no acute distress.  Eyes: The sclera and conjunctiva were normal, pupils were equal in size, round, and reactive to light and extraocular movements were intact.   ENT: The ears and nose were normal in appearance; oropharynx clear, moist mucus membranes.  Neck: The appearance of the neck was normal, no neck mass was observed. There was no jugular-venous distention.   Cardiovascular:  Rate and rhythm evaluated.  Respiratory: Unlabored breathing.  Neurological: No focal deficit, moves all extremities.  Psychiatric: Oriented to person, place, and time, insight and judgment were intact and the affect was normal.

## 2023-03-08 NOTE — PRE-ANESTHESIA EVALUATION ADULT - NSANTHADDINFOFT_GEN_ALL_CORE
IGLESIA d/w pt, wishes to proceed
Risks, benefits and alternatives discussed; including but not limited to headache, nausea, bleeding, infection and local trauma/positioning related injury. All questions answered. The patient agrees to proceed.

## 2023-03-08 NOTE — PRE-ANESTHESIA EVALUATION ADULT - NSANTHPMHFT_GEN_ALL_CORE
Had endoscopy yesterday, has sore throat and swollen lip from procedure
62F with PMHx of COPD, HTN, HLD, Anxiety and recent dx of jejunum ca with mets sent by Dr. Mckee for gastric outlet obstruction workup

## 2023-03-08 NOTE — BRIEF OPERATIVE NOTE - OPERATION/FINDINGS
Patient prepped and draped [facility]. General ET anesthesia. Laparoscopic access for procedure. Laparoscopy identified mass in the jejunum of the small intestine. Also demonstrated evidence of carcinomatosis and extensive tumor metastases. A segment of the jejunum was brought up to the body/antrum of the stomach and a purple load EndoGIA stapler to anastomose these segments together. A 3-0 Quill suture was used to complete the bowel anastomosis. After performing the anastomosis, a segment of liver segment 4 was noted to have a large tumor buden and a specimen was sent for fresh section. Hemostasis achieved using Argon beam and VISTASEAL of the liver bed. Umbilical incision closed with fascial suture (Vicryl) and 4-0 monocryl+dermabond used to close the port sites. Patient prepped and draped [facility]. General ET anesthesia. Laparoscopic access for procedure. Laparoscopy identified mass in the jejunum of the small intestine. Also demonstrated evidence of carcinomatosis and extensive tumor metastases. A segment of the jejunum was brought up to the body/antrum of the stomach and a purple load EndoGIA stapler to anastomose these segments together. A 3-0 Quill suture was used to complete the bowel anastomosis. After performing the anastomosis, a segment of liver segment 4 was noted to have a large tumor buden and a specimen was sent for fresh section. Hemostasis achieved using Argon beam and VISTASEAL of the liver bed. Umbilical incision closed with fascial suture (Vicryl) and 4-0 monocryl+dermabond used to close the port sites. Patient prepped and draped [facility]. General ET anesthesia. Laparoscopic access for procedure. Laparoscopy identified mass in the jejunum of the small intestine. Also demonstrated evidence of carcinomatosis and extensive tumor metastases. A segment of the jejunum was brought up to the body/antrum of the stomach and a purple load EndoGIA stapler to anastomose these segments together. A 3-0 Quill suture was used to complete the bowel anastomosis. After performing the anastomosis, a segment of liver segment 4 was noted to have a large tumor buden and a specimen was sent for fresh section. Hemostasis achieved using Argon beam and VISTASEAL of the liver bed. Umbilical incision closed with fascial suture (Vicryl) and 4-0 monocryl+dermabond used to close the port sites.

## 2023-03-08 NOTE — PROGRESS NOTE ADULT - ASSESSMENT
62-yo female with COPD, HTN, HLD, anxiety, and newly diagnosed metastatic jejunal adenocarcinoma presenting with in referral from oncologist for impending GOO (planned for chemo, has Port in place). S/p EGD 3/7 showing esophagitis. Planned for OR on 3/8 for SBR.    NPO except meds/IVF  Pain/Nausea control prn  Continue home montelukast, Xanax PRN, amlodipine  GI recs  SCDs/HSQ  OOBA/IS  OR 3/8

## 2023-03-08 NOTE — BRIEF OPERATIVE NOTE - NSICDXBRIEFPREOP_GEN_ALL_CORE_FT
PRE-OP DIAGNOSIS:  Carcinomatosis 08-Mar-2023 11:59:21  Mariza Vidal   PRE-OP DIAGNOSIS:  Carcinomatosis 08-Mar-2023 11:59:21  Mariza iVdal

## 2023-03-08 NOTE — PROVIDER CONTACT NOTE (CRITICAL VALUE NOTIFICATION) - BACKGROUND
62 yr old female with COPD, HTN, HLD, anxiety and newly diagnosed metastatic jejunal adenocarcinoma presenting with in referral from oncologist for impending gastric outlet obstruction

## 2023-03-08 NOTE — BRIEF OPERATIVE NOTE - ASSISTANT(S)
-- DO NOT REPLY / DO NOT REPLY ALL --  -- Message is from the [facility]--    Patient has called to cancel their upcoming appointment. Mom will call back after checking her schedule.    Existing appointment date / time: 2/10/20 at 2pm    Provider: Dr Sanchez    Patient reschedule preference: N/A, Mom will call back to schedule when mom has her schedule available.      Caller Information       Type Contact Phone    02/08/2020 07:35 AM Phone (Incoming) MARY LEON (Mother) [phone]          Patient requests callback: No   Callback phone number: [phone]    Turnaround time given to caller:   \"This message will be sent to [state Provider's name]. The clinical team will fulfill your request as soon as they review your message when the office opens on Monday (or after the holiday).\"   Marcy PGY3, Qi PGY5 Marcy PGY3, iQ PGY5

## 2023-03-09 NOTE — PROGRESS NOTE ADULT - SUBJECTIVE AND OBJECTIVE BOX
INTERVAL HPI/OVERNIGHT EVENTS:     STATUS POST:      POST OPERATIVE DAY #:     SUBJECTIVE: Pt seen and examined at bedside this am by surgery team. Tolerating diet, pain well controlled. Denies f/n/v/cp/sob.    MEDICATIONS  (STANDING):  amLODIPine   Tablet 10 milliGRAM(s) Oral daily  heparin   Injectable 5000 Unit(s) SubCutaneous every 8 hours  ketorolac   Injectable 15 milliGRAM(s) IV Push every 6 hours  lactated ringers. 1000 milliLiter(s) (115 mL/Hr) IV Continuous <Continuous>  montelukast 10 milliGRAM(s) Oral daily  pantoprazole  Injectable 40 milliGRAM(s) IV Push daily  potassium chloride  20 mEq/100 mL IVPB 20 milliEquivalent(s) IV Intermittent every 2 hours  potassium phosphate / sodium phosphate Powder (PHOS-NaK) 1 Packet(s) Oral once  QUEtiapine 150 milliGRAM(s) Oral at bedtime  sertraline 50 milliGRAM(s) Oral daily  sucralfate 1 Gram(s) Oral every 12 hours    MEDICATIONS  (PRN):  albuterol    90 MICROgram(s) HFA Inhaler 2 Puff(s) Inhalation every 6 hours PRN Shortness of Breath and/or Wheezing  ALPRAZolam 0.5 milliGRAM(s) Oral three times a day PRN Anxiaty  ondansetron Injectable 4 milliGRAM(s) IV Push every 8 hours PRN Nausea and/or Vomiting      Vital Signs Last 24 Hrs  T(C): 36.8 (09 Mar 2023 08:49), Max: 36.8 (08 Mar 2023 14:58)  T(F): 98.3 (09 Mar 2023 08:49), Max: 98.3 (08 Mar 2023 14:58)  HR: 78 (09 Mar 2023 08:49) (66 - 86)  BP: 159/90 (09 Mar 2023 08:49) (128/68 - 171/64)  BP(mean): 109 (08 Mar 2023 14:06) (91 - 110)  RR: 18 (09 Mar 2023 08:49) (17 - 26)  SpO2: 94% (09 Mar 2023 08:49) (94% - 98%)    Parameters below as of 09 Mar 2023 08:49  Patient On (Oxygen Delivery Method): room air        PHYSICAL EXAM:      Constitutional: A&Ox3    Breasts:    Respiratory: non labored breathing, no respiratory distress    Cardiovascular: NSR, RRR    Gastrointestinal:                 Incision:    Genitourinary:    Extremities: (-) edema                  I&O's Detail    08 Mar 2023 07:01  -  09 Mar 2023 07:00  --------------------------------------------------------  IN:    Lactated Ringers: 690 mL  Total IN: 690 mL    OUT:    Nasogastric/Oral tube (mL): 250 mL    Voided (mL): 2050 mL  Total OUT: 2300 mL    Total NET: -1610 mL      09 Mar 2023 07:01  -  09 Mar 2023 12:25  --------------------------------------------------------  IN:    Lactated Ringers: 345 mL  Total IN: 345 mL    OUT:    Oral Fluid: 0 mL    Voided (mL): 600 mL  Total OUT: 600 mL    Total NET: -255 mL          LABS:                        11.6   10.59 )-----------( 441      ( 09 Mar 2023 07:49 )             34.9     03-09    132<L>  |  94<L>  |  10  ----------------------------<  100<H>  3.6   |  24  |  0.65    Ca    9.9      09 Mar 2023 07:49  Phos  2.7     03-09  Mg     1.6     03-09    TPro  7.2  /  Alb  4.0  /  TBili  0.4  /  DBili  x   /  AST  34  /  ALT  29  /  AlkPhos  333<H>  03-09    PT/INR - ( 08 Mar 2023 05:30 )   PT: 14.0 sec;   INR: 1.17          PTT - ( 08 Mar 2023 05:30 )  PTT:31.1 sec      RADIOLOGY & ADDITIONAL STUDIES: INTERVAL HPI/OVERNIGHT EVENTS: abd soft/mildly distend, appropriately ttp, -f/-bm, dilaudid 0.5 x1 given w/ improvement, NGT w/ bilious OP, emesis x1 about 100cc, flushed NGT, benadryl x1 for sleep (confirmed w/ pharmacy ok to give) as pt believes seroquel and xanax were out of her system after she vomited     STATUS POST:  3/8: laparoscopic gastrojejunostomy, liver bx of segment 4    POST OPERATIVE DAY #: 1    SUBJECTIVE: Pt seen and examined at bedside this am by surgery team. Denies f/n/v/cp/sob.    MEDICATIONS  (STANDING):  amLODIPine   Tablet 10 milliGRAM(s) Oral daily  heparin   Injectable 5000 Unit(s) SubCutaneous every 8 hours  ketorolac   Injectable 15 milliGRAM(s) IV Push every 6 hours  lactated ringers. 1000 milliLiter(s) (115 mL/Hr) IV Continuous <Continuous>  montelukast 10 milliGRAM(s) Oral daily  pantoprazole  Injectable 40 milliGRAM(s) IV Push daily  potassium chloride  20 mEq/100 mL IVPB 20 milliEquivalent(s) IV Intermittent every 2 hours  potassium phosphate / sodium phosphate Powder (PHOS-NaK) 1 Packet(s) Oral once  QUEtiapine 150 milliGRAM(s) Oral at bedtime  sertraline 50 milliGRAM(s) Oral daily  sucralfate 1 Gram(s) Oral every 12 hours    MEDICATIONS  (PRN):  albuterol    90 MICROgram(s) HFA Inhaler 2 Puff(s) Inhalation every 6 hours PRN Shortness of Breath and/or Wheezing  ALPRAZolam 0.5 milliGRAM(s) Oral three times a day PRN Anxiaty  ondansetron Injectable 4 milliGRAM(s) IV Push every 8 hours PRN Nausea and/or Vomiting    Vital Signs Last 24 Hrs  T(C): 36.8 (09 Mar 2023 08:49), Max: 36.8 (08 Mar 2023 14:58)  T(F): 98.3 (09 Mar 2023 08:49), Max: 98.3 (08 Mar 2023 14:58)  HR: 78 (09 Mar 2023 08:49) (66 - 86)  BP: 159/90 (09 Mar 2023 08:49) (128/68 - 171/64)  BP(mean): 109 (08 Mar 2023 14:06) (91 - 110)  RR: 18 (09 Mar 2023 08:49) (17 - 26)  SpO2: 94% (09 Mar 2023 08:49) (94% - 98%)    Parameters below as of 09 Mar 2023 08:49  Patient On (Oxygen Delivery Method): room air    PHYSICAL EXAM:    Constitutional: A&Ox3, NAD. NGT in place    Respiratory: non labored breathing, no respiratory distress    Cardiovascular: NSR, RRR    Gastrointestinal: abdomen soft, nd, appropriately tender, Incisions c/d/i    Extremities: wwp, no calf tenderness or edema. SCDs in place     I&O's Detail    08 Mar 2023 07:01  -  09 Mar 2023 07:00  --------------------------------------------------------  IN:    Lactated Ringers: 690 mL  Total IN: 690 mL    OUT:    Nasogastric/Oral tube (mL): 250 mL    Voided (mL): 2050 mL  Total OUT: 2300 mL    Total NET: -1610 mL      09 Mar 2023 07:01  -  09 Mar 2023 12:25  --------------------------------------------------------  IN:    Lactated Ringers: 345 mL  Total IN: 345 mL    OUT:    Oral Fluid: 0 mL    Voided (mL): 600 mL  Total OUT: 600 mL    Total NET: -255 mL          LABS:                        11.6   10.59 )-----------( 441      ( 09 Mar 2023 07:49 )             34.9     03-09    132<L>  |  94<L>  |  10  ----------------------------<  100<H>  3.6   |  24  |  0.65    Ca    9.9      09 Mar 2023 07:49  Phos  2.7     03-09  Mg     1.6     03-09    TPro  7.2  /  Alb  4.0  /  TBili  0.4  /  DBili  x   /  AST  34  /  ALT  29  /  AlkPhos  333<H>  03-09    PT/INR - ( 08 Mar 2023 05:30 )   PT: 14.0 sec;   INR: 1.17          PTT - ( 08 Mar 2023 05:30 )  PTT:31.1 sec      RADIOLOGY & ADDITIONAL STUDIES:

## 2023-03-09 NOTE — PROGRESS NOTE ADULT - ASSESSMENT
62-yo female with COPD, HTN, HLD, anxiety, and newly diagnosed metastatic jejunal adenocarcinoma presenting with in referral from oncologist for impending GOO (planned for chemo, has Port in place). S/p EGD 3/7 showing esophagitis. Now s/p laparoscopic gastrojejunostomy, liver bx of segment 4 (3/8).    NPO/IVF  Pain/nausea control prn  NGT to LIWS, PO meds via NGT only   continue home montelukast. Xanax PRN., amlodipine.   GI recs  SCD/SQH  OOBA/IS  f/u UGIS, possible removal of NGT later today

## 2023-03-09 NOTE — DIETITIAN INITIAL EVALUATION ADULT - PERTINENT LABORATORY DATA
03-09    132<L>  |  94<L>  |  10  ----------------------------<  100<H>  3.6   |  24  |  0.65    Ca    9.9      09 Mar 2023 07:49  Phos  2.7     03-09  Mg     1.6     03-09    TPro  7.2  /  Alb  4.0  /  TBili  0.4  /  DBili  x   /  AST  34  /  ALT  29  /  AlkPhos  333<H>  03-09  A1C with Estimated Average Glucose Result: 4.8 % (03-07-23 @ 15:37)

## 2023-03-09 NOTE — DIETITIAN INITIAL EVALUATION ADULT - OTHER CALCULATIONS
Based on Standards of Care pt >% IBW thus ideal body weight used for all calculations. Needs adjusted based on age, malnutrition, chemo.

## 2023-03-09 NOTE — DIETITIAN INITIAL EVALUATION ADULT - PERTINENT MEDS FT
MEDICATIONS  (STANDING):  amLODIPine   Tablet 10 milliGRAM(s) Oral daily  heparin   Injectable 5000 Unit(s) SubCutaneous every 8 hours  ketorolac   Injectable 15 milliGRAM(s) IV Push every 6 hours  lactated ringers. 1000 milliLiter(s) (115 mL/Hr) IV Continuous <Continuous>  montelukast 10 milliGRAM(s) Oral daily  pantoprazole  Injectable 40 milliGRAM(s) IV Push daily  QUEtiapine 150 milliGRAM(s) Oral at bedtime  sertraline 50 milliGRAM(s) Oral daily  sucralfate 1 Gram(s) Oral every 12 hours    MEDICATIONS  (PRN):  albuterol    90 MICROgram(s) HFA Inhaler 2 Puff(s) Inhalation every 6 hours PRN Shortness of Breath and/or Wheezing  ALPRAZolam 0.5 milliGRAM(s) Oral three times a day PRN Anxiaty  ondansetron Injectable 4 milliGRAM(s) IV Push every 8 hours PRN Nausea and/or Vomiting

## 2023-03-09 NOTE — DIETITIAN INITIAL EVALUATION ADULT - PERSON TAUGHT/METHOD
Pt amenable to education; RD provided education in regards to the importance of adequate macro and micronutrients, as well as hydration to support ADLs, maintain energy levels and overall functional/nutritional status. General healthful education provided. RD provided education on importance of optimal PO intake, discussed increased nutrient needs 2/2 cancer undergoing chemotherapy/radiation. Encouraged small, frequent, nutrient dense meals. Discussed protein sources. Pt was receptive and expressed understanding. Nutrition handouts provided to pt. Additional nutrition recommendations below. RD to continue to follow per protocol./verbal instruction/written material/patient instructed

## 2023-03-09 NOTE — DIETITIAN INITIAL EVALUATION ADULT - NSFNSGIIOFT_GEN_A_CORE
03-08-23 @ 07:01  -  03-09-23 @ 07:00  --------------------------------------------------------  OUT:    Nasogastric/Oral tube (mL): 250 mL  Total OUT: 250 mL    Total NET: -250 mL      03-09-23 @ 07:01  -  03-09-23 @ 17:32  --------------------------------------------------------  OUT:    Nasogastric/Oral tube (mL): 2 mL  Total OUT: 2 mL    Total NET: -2 mL

## 2023-03-09 NOTE — CHART NOTE - NSCHARTNOTEFT_GEN_A_CORE
EGD Pathology from 3/7 noting gastric antral and fundic mucosa without significant histologic abnormalities.    No further recommendations from GI standpoint.    Jamil Guerrero DO  Gastroenterology Fellow  Pager: [phone] EGD Pathology from 3/7 noting gastric antral and fundic mucosa without significant histologic abnormalities.    No further recommendations from GI standpoint.    Jamil Guerrero DO  Gastroenterology Fellow  Pager: [phone] EGD Pathology from 3/7 noting gastric antral and fundic mucosa without significant histologic abnormalities.    No further recommendations from GI standpoint.    Jamil Guerrero DO  Gastroenterology Fellow  Pager: [phone]

## 2023-03-09 NOTE — DIETITIAN INITIAL EVALUATION ADULT - OTHER INFO
62-yo female with COPD, HTN, HLD, anxiety, and newly diagnosed metastatic jejunal adenocarcinoma presenting with in referral from oncologist for impending GOO (planned for chemo, has Port in place). S/p EGD 3/7 showing esophagitis. Now s/p laparoscopic gastrojejunostomy, liver bx of segment 4 (3/8).    Pt seen in room for nutrition assessment. Pt reports poor appetite PTA and low appetite, slightly improving during hospital stay. As per diet recall PTA: pt eats chicken often, fish, not much beef, vegetables, stir echavarria dishes, small portions, especially since pt's appetite decreased. Noted with <50% PO x 5+ days. Pt was NPO with NGT to LIWS, which was removed this morning. Pt is currently on Clear Liquids diet, unable to assess PO intakes since pt had not eaten lunch yet when RD visited pt. No cultural, Muslim, or ethnic food preferences noted. NKFA. Pt endorsed she had 10-12# wt loss in the past ~1 month; this 6-7% weight loss in 1 month is clinically significant. Dosing wt: 160#, IBW: 130#, pt is 123% of IBW. Denies N/V/D, -F/-BM. No edema. Skin: abdominal surgical incision noted. Tevin: 20. No issues chewing or swallowing noted. Denies pain. Labs reviewed: low Na (132), elevated Glucose (100), elevated ALP (333); RD to continue to monitor trends. Nutritionally pertinent medications: IVF (LR), zofran. RD observed pt with no overt signs of muscle or fat wasting. Based on ASPEN guidelines, pt meets criteria for severe malnutrition in the context of acute onset of illness/injury (r/t PO intakes, wt loss). Pt amenable to education; RD provided education in regards to the importance of adequate macro and micronutrients, as well as hydration to support ADLs, maintain energy levels and overall functional/nutritional status. General healthful education provided. RD provided education on importance of optimal PO intake, discussed increased nutrient needs 2/2 cancer undergoing chemotherapy/radiation. Encouraged small, frequent, nutrient dense meals. Discussed protein sources. Pt was receptive and expressed understanding. Additional nutrition recommendations below. RD to continue to follow per protocol.   62-yo female with COPD, HTN, HLD, anxiety, and newly diagnosed metastatic jejunal adenocarcinoma presenting with in referral from oncologist for impending GOO (planned for chemo, has Port in place). S/p EGD 3/7 showing esophagitis. Now s/p laparoscopic gastrojejunostomy, liver bx of segment 4 (3/8).    Pt seen in room for nutrition assessment. Pt reports poor appetite PTA and low appetite, slightly improving during hospital stay. As per diet recall PTA: pt eats chicken often, fish, not much beef, vegetables, stir echavarria dishes, small portions, especially since pt's appetite decreased. Noted with <50% PO x 5+ days. Pt was NPO with NGT to LIWS, which was removed this morning. Pt is currently on Clear Liquids diet, unable to assess PO intakes since pt had not eaten lunch yet when RD visited pt. No cultural, Jewish, or ethnic food preferences noted. NKFA. Pt endorsed she had 10-12# wt loss in the past ~1 month; this 6-7% weight loss in 1 month is clinically significant. Dosing wt: 160#, IBW: 130#, pt is 123% of IBW. Denies N/V/D, -F/-BM. No edema. Skin: abdominal surgical incision noted. Tevin: 20. No issues chewing or swallowing noted. Denies pain. Labs reviewed: low Na (132), elevated Glucose (100), elevated ALP (333); RD to continue to monitor trends. Nutritionally pertinent medications: IVF (LR), zofran. RD observed pt with no overt signs of muscle or fat wasting. Based on ASPEN guidelines, pt meets criteria for severe malnutrition in the context of acute onset of illness/injury (r/t PO intakes, wt loss). Pt amenable to education; RD provided education in regards to the importance of adequate macro and micronutrients, as well as hydration to support ADLs, maintain energy levels and overall functional/nutritional status. General healthful education provided. RD provided education on importance of optimal PO intake, discussed increased nutrient needs 2/2 cancer undergoing chemotherapy/radiation. Encouraged small, frequent, nutrient dense meals. Discussed protein sources. Pt was receptive and expressed understanding. Additional nutrition recommendations below. RD to continue to follow per protocol.   62-yo female with COPD, HTN, HLD, anxiety, and newly diagnosed metastatic jejunal adenocarcinoma presenting with in referral from oncologist for impending GOO (planned for chemo, has Port in place). S/p EGD 3/7 showing esophagitis. Now s/p laparoscopic gastrojejunostomy, liver bx of segment 4 (3/8).    Pt seen in room for nutrition assessment. Pt reports poor appetite PTA and low appetite, slightly improving during hospital stay. As per diet recall PTA: pt eats chicken often, fish, not much beef, vegetables, stir echavarria dishes, small portions, especially since pt's appetite decreased. Noted with <50% PO x 5+ days. Pt was NPO with NGT to LIWS, which was removed this morning. Pt is currently on Clear Liquids diet, unable to assess PO intakes since pt had not eaten lunch yet when RD visited pt. No cultural, Uatsdin, or ethnic food preferences noted. NKFA. Pt endorsed she had 10-12# wt loss in the past ~1 month; this 6-7% weight loss in 1 month is clinically significant. Dosing wt: 160#, IBW: 130#, pt is 123% of IBW. Denies N/V/D, -F/-BM. No edema. Skin: abdominal surgical incision noted. Tevin: 20. No issues chewing or swallowing noted. Denies pain. Labs reviewed: low Na (132), elevated Glucose (100), elevated ALP (333); RD to continue to monitor trends. Nutritionally pertinent medications: IVF (LR), zofran. RD observed pt with no overt signs of muscle or fat wasting. Based on ASPEN guidelines, pt meets criteria for severe malnutrition in the context of acute onset of illness/injury (r/t PO intakes, wt loss). Pt amenable to education; RD provided education in regards to the importance of adequate macro and micronutrients, as well as hydration to support ADLs, maintain energy levels and overall functional/nutritional status. General healthful education provided. RD provided education on importance of optimal PO intake, discussed increased nutrient needs 2/2 cancer undergoing chemotherapy/radiation. Encouraged small, frequent, nutrient dense meals. Discussed protein sources. Pt was receptive and expressed understanding. Additional nutrition recommendations below. RD to continue to follow per protocol.

## 2023-03-09 NOTE — DIETITIAN INITIAL EVALUATION ADULT - NUTRITIONGOAL OUTCOME1
Pt to consistently meet at least 75% of EEE via tolerated route that is consistent with GOC and no longer exhibit s/s of malnutrition during hospital stay;

## 2023-03-09 NOTE — DIETITIAN INITIAL EVALUATION ADULT - NAME AND PHONE
Olive Funk, GERRI, CDN, ext [phone]  Olive Funk, GERRI, CDN, ext [phone]  Olvie Funk, GERRI, CDN, ext [phone]

## 2023-03-09 NOTE — DIETITIAN INITIAL EVALUATION ADULT - ADD RECOMMEND
1. Continue with current diet order  >>as medically feasible, advance diet as tolerated to >> FLD >> Soft, Low Fiber Diet >> Regular diet as tolerated, per medical team   >>when diet is advanced, recommend Ensure Max Protein ONS TID (150kcal, 30gPRO per serving) per pt preference, for additional nutrients   2. Encourage pt to meet nutritional needs as able   3. Monitor PO intakes, trend weights (weekly), monitor skin integrity, monitor labs (electrolytes, CMP), monitor GI fxn   4. Encourage adherence to diet education (reinforce as able)   5. Consider MVI supplementation  6. Pain and bowel regimen per team   7. Will continue to assess/honor preferences as able   8. Align nutrition interventions with GOC at all times

## 2023-03-10 NOTE — DISCHARGE NOTE PROVIDER - NSDCCPCAREPLAN_GEN_ALL_CORE_FT
PRINCIPAL DISCHARGE DIAGNOSIS  Diagnosis: Small bowel carcinoma  Assessment and Plan of Treatment: metastatic jejunal adenocarcinoma to liver      SECONDARY DISCHARGE DIAGNOSES  Diagnosis: HTN (hypertension)  Assessment and Plan of Treatment:     Diagnosis: Anxiety  Assessment and Plan of Treatment:     Diagnosis: History of COPD  Assessment and Plan of Treatment:

## 2023-03-10 NOTE — PROGRESS NOTE ADULT - ASSESSMENT
62-yo female with COPD, HTN, HLD, anxiety, and newly diagnosed metastatic jejunal adenocarcinoma presenting with in referral from oncologist for impending GOO (planned for chemo, has Port in place). S/p EGD 3/7 showing esophagitis. Now s/p laparoscopic gastrojejunostomy, liver bx of segment 4 (3/8).    Plan:  CLD/IVF  Pain/nausea control prn  continue home montelukast. Xanax PRN., amlodipine.   GI recs  SCD/IS/SQH   62-yo female with COPD, HTN, HLD, anxiety, and newly diagnosed metastatic jejunal adenocarcinoma presenting with in referral from oncologist for impending GOO (planned for chemo, has Port in place). S/p EGD 3/7 showing esophagitis. Now s/p laparoscopic gastrojejunostomy, liver bx of segment 4 (3/8).    Plan:  Adv to Reg diet  Pain/nausea control prn  continue home montelukast. Xanax PRN., amlodipine.   GI recs  SCD/IS/SQH

## 2023-03-10 NOTE — DISCHARGE NOTE PROVIDER - NSDCFUADDINST_GEN_ALL_CORE_FT
Please follow up with Dr. Bravo in 1 week. Please call to schedule your appointment.   Follow up with your medical oncologist in 1 week.   You may resume a regular diet.   Please take tylenol 1000mg every 6 hours as needed for pain. You may alternate every 3 hours with ibuprofen.   Please no heavy lifting or straining for 4-6 weeks or until cleared by surgeon.   You may shower, pat but do not scrub the incision sites.     General Discharge Instructions:  Please resume all regular home medications unless specifically advised not to take a particular medication. Also, please take any new medications as prescribed.  Please get plenty of rest, continue to ambulate several times per day, and drink adequate amounts of fluids. Avoid lifting weights greater than 5-10 lbs until you follow-up with your surgeon, who will instruct you further regarding activity restrictions.  Avoid driving or operating heavy machinery while taking pain medications.  Please follow-up with your surgeon and Primary Care Provider (PCP) as advised.  Incision Care:  *Please call your doctor or nurse practitioner if you have increased pain, swelling, redness, or drainage from the incision site.  *Avoid swimming and baths until your follow-up appointment.  *You may shower, and wash surgical incisions with a mild soap and warm water. Gently pat the area dry.  *If you have staples, they will be removed at your follow-up appointment.  *If you have steri-strips, they will fall off on their own. Please remove any remaining strips 7-10 days after surgery.    Warning Signs:  Please call your doctor or nurse practitioner if you experience the following:  *You experience new chest pain, pressure, squeezing or tightness.  *New or worsening cough, shortness of breath, or wheeze.  *If you are vomiting and cannot keep down fluids or your medications.  *You are getting dehydrated due to continued vomiting, diarrhea, or other reasons. Signs of dehydration include dry mouth, rapid heartbeat, or feeling dizzy or faint when standing.  *You see blood or dark/black material when you vomit or have a bowel movement.  *You experience burning when you urinate, have blood in your urine, or experience a discharge.  *Your pain is not improving within 8-12 hours or is not gone within 24 hours. Call or return immediately if your pain is getting worse, changes location, or moves to your chest or back.  *You have shaking chills, or fever greater than 101.5 degrees Fahrenheit or 38 degrees Celsius.  *Any change in your symptoms, or any new symptoms that concern you.

## 2023-03-10 NOTE — DISCHARGE NOTE NURSING/CASE MANAGEMENT/SOCIAL WORK - NSDCPEFALRISK_GEN_ALL_CORE
For information on Fall & Injury Prevention, visit: [email or web address]/[email or web address]-[email or web address]-health-[email or web address]-mobility OR  https://[email or web address]/news/fall-prevention-tips-[email or web address]-avoid-injury OR  https://www.cdc.gov/steadi/patient.html For information on Fall & Injury Prevention, visit: [email or web address]://www.[email or web address]/[email or web address]-[email or web address]-health-[email or web address]-mobility OR  https://www.[email or web address]/news/fall-prevention-tips-[email or web address]-avoid-injury OR  https://www.cdc.gov/steadi/patient.html For information on Fall & Injury Prevention, visit: https://www.[facility]/news/fall-prevention-protects-[email or web address]-maintains-health-and-mobility OR  https://www.[facility]/news/fall-prevention-tips-to-avoid-injury OR  https://www.cdc.gov/steadi/patient.html

## 2023-03-10 NOTE — DISCHARGE NOTE PROVIDER - HOSPITAL COURSE
62-yo female with COPD, HTN, HLD, anxiety, and newly diagnosed metastatic jejunal adenocarcinoma presenting with in referral from oncologist for impending GOO (planned for chemo, has Port in place). S/p EGD 3/7 showing esophagitis. Now s/p laparoscopic gastrojejunostomy, liver bx of segment 4 (3/8). postoperative course umcomplicated. Patient reports pain well controlled, diet advanced slowly, and on day of discharge, patient tolerating regular diet, without nausea or vomiting, with consistent bowel function and pain is well controlled. Patient deemed surgically cleared for discharge.

## 2023-03-10 NOTE — PROGRESS NOTE ADULT - SUBJECTIVE AND OBJECTIVE BOX
INTERVAL HPI/OVERNIGHT EVENTS:    STATUS POST:      POST OPERATIVE DAY #:     SUBJECTIVE:  Flatus: [ ] YES [ ] NO             Bowel Movement: [ ] YES [ ] NO  Pain (0-10):            Pain Control Adequate: [ ] YES [ ] NO  Nausea: [ ] YES [ ] NO            Vomiting: [ ] YES [ ] NO  Diarrhea: [ ] YES [ ] NO         Constipation: [ ] YES [ ] NO     Chest Pain: [ ] YES [ ] NO    SOB:  [ ] YES [ ] NO    MEDICATIONS  (STANDING):  amLODIPine   Tablet 10 milliGRAM(s) Oral daily  heparin   Injectable 5000 Unit(s) SubCutaneous every 8 hours  ketorolac   Injectable 15 milliGRAM(s) IV Push every 6 hours  lactated ringers. 1000 milliLiter(s) (100 mL/Hr) IV Continuous <Continuous>  montelukast 10 milliGRAM(s) Oral daily  pantoprazole  Injectable 40 milliGRAM(s) IV Push daily  QUEtiapine 150 milliGRAM(s) Oral at bedtime  sertraline 50 milliGRAM(s) Oral daily  sucralfate 1 Gram(s) Oral every 12 hours    MEDICATIONS  (PRN):  albuterol    90 MICROgram(s) HFA Inhaler 2 Puff(s) Inhalation every 6 hours PRN Shortness of Breath and/or Wheezing  ALPRAZolam 0.5 milliGRAM(s) Oral three times a day PRN Anxiaty  ondansetron Injectable 4 milliGRAM(s) IV Push every 8 hours PRN Nausea and/or Vomiting      Vital Signs Last 24 Hrs  T(C): 36.8 (10 Mar 2023 04:26), Max: 36.9 (09 Mar 2023 20:33)  T(F): 98.2 (10 Mar 2023 04:26), Max: 98.5 (09 Mar 2023 20:33)  HR: 84 (10 Mar 2023 04:26) (76 - 84)  BP: 157/79 (10 Mar 2023 04:26) (144/82 - 168/99)  BP(mean): 102 (09 Mar 2023 20:33) (102 - 102)  RR: 17 (10 Mar 2023 04:26) (17 - 18)  SpO2: 96% (10 Mar 2023 04:26) (94% - 96%)    Parameters below as of 10 Mar 2023 04:26  Patient On (Oxygen Delivery Method): room air        PHYSICAL EXAM:      Constitutional: A&Ox3    Breasts:    Respiratory: non labored breathing, no respiratory distress    Cardiovascular: NSR, RRR    Gastrointestinal:                 Incision:    Genitourinary:    Extremities: (-) edema                  I&O's Detail    09 Mar 2023 07:01  -  10 Mar 2023 07:00  --------------------------------------------------------  IN:    Lactated Ringers: 700 mL    Lactated Ringers: 1955 mL    Oral Fluid: 600 mL  Total IN: 3255 mL    OUT:    Nasogastric/Oral tube (mL): 2 mL    Voided (mL): 2350 mL  Total OUT: 2352 mL    Total NET: 903 mL      10 Mar 2023 07:01  -  10 Mar 2023 08:17  --------------------------------------------------------  IN:    Lactated Ringers: 100 mL  Total IN: 100 mL    OUT:  Total OUT: 0 mL    Total NET: 100 mL          LABS:                        11.7   8.85  )-----------( 420      ( 10 Mar 2023 08:06 )             35.5     03-09    132<L>  |  94<L>  |  10  ----------------------------<  100<H>  3.6   |  24  |  0.65    Ca    9.9      09 Mar 2023 07:49  Phos  2.7     03-09  Mg     1.6     03-09    TPro  7.2  /  Alb  4.0  /  TBili  0.4  /  DBili  x   /  AST  34  /  ALT  29  /  AlkPhos  333<H>  03-09          RADIOLOGY & ADDITIONAL STUDIES: INTERVAL HPI/OVERNIGHT EVENTS:  ON: eric CLD, +f/+bm, -n/-v, ready to go home tmrw     STATUS POST:  3/8: laparoscopic gastrojejunostomy, liver bx of segment 4    POST OPERATIVE DAY #: 2    SUBJECTIVE:  Patient seen and examined by chief resident and team on AM rounds. Patient reports tolerating clears, -n/-v/+bf. Pain well controlled. Patient expressed that she would like to go home today and feels ready to go. Will adv diet to Regular and if tolerating, d/c home today.     MEDICATIONS  (STANDING):  amLODIPine   Tablet 10 milliGRAM(s) Oral daily  heparin   Injectable 5000 Unit(s) SubCutaneous every 8 hours  ketorolac   Injectable 15 milliGRAM(s) IV Push every 6 hours  lactated ringers. 1000 milliLiter(s) (100 mL/Hr) IV Continuous <Continuous>  montelukast 10 milliGRAM(s) Oral daily  pantoprazole  Injectable 40 milliGRAM(s) IV Push daily  QUEtiapine 150 milliGRAM(s) Oral at bedtime  sertraline 50 milliGRAM(s) Oral daily  sucralfate 1 Gram(s) Oral every 12 hours    MEDICATIONS  (PRN):  albuterol    90 MICROgram(s) HFA Inhaler 2 Puff(s) Inhalation every 6 hours PRN Shortness of Breath and/or Wheezing  ALPRAZolam 0.5 milliGRAM(s) Oral three times a day PRN Anxiaty  ondansetron Injectable 4 milliGRAM(s) IV Push every 8 hours PRN Nausea and/or Vomiting      Vital Signs Last 24 Hrs  T(C): 36.8 (10 Mar 2023 04:26), Max: 36.9 (09 Mar 2023 20:33)  T(F): 98.2 (10 Mar 2023 04:26), Max: 98.5 (09 Mar 2023 20:33)  HR: 84 (10 Mar 2023 04:26) (76 - 84)  BP: 157/79 (10 Mar 2023 04:26) (144/82 - 168/99)  BP(mean): 102 (09 Mar 2023 20:33) (102 - 102)  RR: 17 (10 Mar 2023 04:26) (17 - 18)  SpO2: 96% (10 Mar 2023 04:26) (94% - 96%)    Parameters below as of 10 Mar 2023 04:26  Patient On (Oxygen Delivery Method): room air        PHYSICAL EXAM:  Constitutional: A&Ox3    Respiratory: non labored breathing, no respiratory distress    Cardiovascular: NSR, RRR    Gastrointestinal: Soft, NTND abdomen. Incisions c/d/i. No rebound or guarding.     Extremities: (-) edema                  I&O's Detail    09 Mar 2023 07:01  -  10 Mar 2023 07:00  --------------------------------------------------------  IN:    Lactated Ringers: 700 mL    Lactated Ringers: 1955 mL    Oral Fluid: 600 mL  Total IN: 3255 mL    OUT:    Nasogastric/Oral tube (mL): 2 mL    Voided (mL): 2350 mL  Total OUT: 2352 mL    Total NET: 903 mL      10 Mar 2023 07:01  -  10 Mar 2023 08:17  --------------------------------------------------------  IN:    Lactated Ringers: 100 mL  Total IN: 100 mL    OUT:  Total OUT: 0 mL    Total NET: 100 mL          LABS:                        11.7   8.85  )-----------( 420      ( 10 Mar 2023 08:06 )             35.5     03-09    132<L>  |  94<L>  |  10  ----------------------------<  100<H>  3.6   |  24  |  0.65    Ca    9.9      09 Mar 2023 07:49  Phos  2.7     03-09  Mg     1.6     03-09    TPro  7.2  /  Alb  4.0  /  TBili  0.4  /  DBili  x   /  AST  34  /  ALT  29  /  AlkPhos  333<H>  03-09          RADIOLOGY & ADDITIONAL STUDIES:

## 2023-03-10 NOTE — DISCHARGE NOTE PROVIDER - PROVIDER TOKENS
PROVIDER:[TOKEN:[[id number]],FOLLOWUP:[1 week]] PROVIDER:[TOKEN:[[id number]],FOLLOWUP:[1 week]] PROVIDER:[TOKEN:[[id number]],FOLLOWUP:[1 week]]

## 2023-03-10 NOTE — DISCHARGE NOTE NURSING/CASE MANAGEMENT/SOCIAL WORK - PATIENT PORTAL LINK FT
You can access the FollowMyHealth Patient Portal offered by [facility] by registering at the following website: [email or web address]. By joining [facility]’s FollowMyHealth portal, you will also be able to view your health information using other applications (apps) compatible with our system. You can access the FollowMyHealth Patient Portal offered by [facility] by registering at the following website: [email or web address]. By joining [facility]’s FollowMyHealth portal, you will also be able to view your health information using other applications (apps) compatible with our system. You can access the FollowMyHealth Patient Portal offered by [facility] by registering at the following website: [email or web address]. By joining [facility]’s FollowMyHealth portal, you will also be able to view your health information using other applications (apps) compatible with our system.

## 2023-03-10 NOTE — DISCHARGE NOTE PROVIDER - NSDCCPTREATMENT_GEN_ALL_CORE_FT
PRINCIPAL PROCEDURE  Procedure: Gastrojejunostomy, laparoscopic  Findings and Treatment: s/p laparoscopic gastrojejunostomy, liver bx of segment 4 (3/8).

## 2023-03-10 NOTE — DISCHARGE NOTE PROVIDER - NSDCMRMEDTOKEN_GEN_ALL_CORE_FT
albuterol 90 mcg/inh inhalation powder: 2 puff(s) inhaled every 6 hours  ALPRAZolam 0.5 mg oral tablet: 1 tab(s) orally 3 times a day  amlodipine-benazepril 10 mg-40 mg oral capsule: 1 cap(s) orally once a day  fentaNYL 12 mcg/hr transdermal film, extended release: 1 film(s) transdermal every 72 hours  hydroCHLOROthiazide 12.5 mg oral capsule: 1 cap(s) orally once a day  montelukast 10 mg oral tablet: 1 tab(s) orally once a day  pantoprazole 40 mg oral delayed release tablet: 1 tab(s) orally once a day  QUEtiapine 150 mg oral tablet, extended release: 1 tab(s) orally once a day (in the evening)  sertraline 50 mg oral tablet: 1 tab(s) orally once a day  sucralfate 1 g oral tablet: 1 tab(s) orally once a day

## 2023-03-10 NOTE — DISCHARGE NOTE PROVIDER - CARE PROVIDER_API CALL
Jasper Bravo  SURGERY  [address] [place] [address]  Phone: [phone]  Fax: [phone]  Follow Up Time: 1 week   Jasper Bravo  SURGERY  [address] [place] [address]  Phone: [phone]  Fax: [phone]  Follow Up Time: 1 week   Jasper Bravo  SURGERY  [address] [place] [address]  Phone: [phone]  Fax: [phone]  Follow Up Time: 1 week

## 2023-04-26 NOTE — PRE-ANESTHESIA EVALUATION ADULT - HEART RATE (BEATS/MIN)
----- Message from Ruperto Kelley MD sent at 4/26/2023  1:29 PM CDT -----  Nl CBC. Lead level 15.7 . Needs venous sample and ZPP. HD referral. Reapeat lead level in 3 months-venous sample. Thank you.  
Call placed to patient's mom. Mother informed of elevated capillary lead and need to repeat venous level. Appointment scheduled for Friday. Call placed to Kristen ELKINS [facility] Dept. Message left to inform of elevated capillary lead.    
[age]

## 2023-10-11 NOTE — DIETITIAN INITIAL EVALUATION ADULT - NUTRITION DIAGNOSIS
yes... Doxycycline Pregnancy And Lactation Text: This medication is Pregnancy Category D and not consider safe during pregnancy. It is also excreted in breast milk but is considered safe for shorter treatment courses.

## 2023-11-30 NOTE — ED ADULT NURSE REASSESSMENT NOTE - NS ED NURSE REASSESS COMMENT FT1
Mediport accessed as ordered and PIV placed Right AC. Dr. Rosales aware unable to obtain bloods both access and poor arterial pulses palpated. Awaiting US for bloods

## 2023-11-30 NOTE — ED ADULT NURSE NOTE - NSICDXPASTMEDICALHX_GEN_ALL_CORE_FT
PAST MEDICAL HISTORY:  Adenocarcinoma of small intestine, stage 4     Anxiety     Asthma     HLD (hyperlipidemia)     Hypertension

## 2023-11-30 NOTE — ED PROVIDER NOTE - CARE PLAN
Principal Discharge DX:	Enterocolitis  Secondary Diagnosis:	LUDA (acute kidney injury)  Secondary Diagnosis:	Hypotension   1

## 2023-11-30 NOTE — ED ADULT NURSE NOTE - OBJECTIVE STATEMENT
Pt is a 64yo Female AAOx3 NKDA complaining of weakness, SOB, and abdominal pain 8/10 since Feburary, per pt daughter, Pt PMH Stage 4 cancer of small intestine, and HTN. Pt denies any nausea, vomiting, difficulty urinating, SOB, and headache. Pt endorses BMs only once every couple days with Senna and BeneFiber. Pt lying in stretcher on continuous cardiac monitor. Received patient in 28 with multiple family members. Undressed with 2 staff member with gentle care. Right ACW unaccessed mediport noted. Skin audit = stage 1 upper back bony prominence, Stage 2 coccyx, and stage 1 bilat buttocks. Alleyvyn dressing applied for skin protection with barrier cream. Pt is a 64yo Female AAOx3 NKDA complaining of weakness, SOB, and abdominal pain 8/10 since February, per pt daughter, Pt PMH Stage 4 cancer of small intestine, and HTN. Pt denies any nausea, vomiting, difficulty urinating, SOB, and headache. Pt endorses BMs only once every couple days with Senna and BeneFiber. Pt lying in stretcher on continuous cardiac monitor. Abd distended with veins prominent. Bilat LE edema +3 ankles/feet +2 legs. Primafit placed

## 2023-11-30 NOTE — ED ADULT NURSE NOTE - ED STAT RN HANDOFF DETAILS
w
Report endorsed to oncoming RN. Safety checks compld this shift/Safety rounds completed hourly.  IV sites checked Q2+remains WDL. Meds given as ord with no s/s of adverse RXNs. Fall +skin precs in place. Any issues endorsed to oncoming RN for follow up.

## 2023-11-30 NOTE — ED ADULT NURSE REASSESSMENT NOTE - NS ED NURSE REASSESS COMMENT FT1
pt still has not provided urine specimen despite Primafit in place. Daughter Bliss at bedside aware of need for specimen. Sent to CT via stretcher and reconnected as ordered. Pt resting comfortably at this time. No s/s of pain noted.

## 2023-11-30 NOTE — ED PROVIDER NOTE - OBJECTIVE STATEMENT
63F PMH HTN, small intestine CA stage 4 (diagnosed in Feb 2023, on chemo since April, last dose 11/1, plan to change to new regimen 12/7 as not responding / mets to peritoneal cavity, gastrojejunostomy March 2023 @ [facility] w/ Dr Mckee, last paracentesis for ascites 2 days ago, cancer care w/ [facility]) pw abd pain. Per daughter, pt in good health this AM, walked to restroom unassisted, daughter went to work x hours, upon arrival home, pt appeared weak & w/ pain. Endorses diffuse cramping abd pain. Pt w/o fever, h/a, N/V/D/C, dysuria, foul-smelling urine, LE swelling, CP, SOB, cough. Denies recent travel, sick contacts. Full Code. Pt fed by mouth, blended meals. Pt not on BP medication x1wk.     PMH as above, PSH as above, NKDA, meds as listed.

## 2023-11-30 NOTE — ED ADULT TRIAGE NOTE - BP NONINVASIVE DIASTOLIC (MM HG)
PA aware of BP, morphine held per SUSANNE Covington. additional fluids to be hung per orders. pt denies cp, sob, dizziness, lightheadedness at this time. will continue to monitor 56

## 2023-11-30 NOTE — ED PROVIDER NOTE - PHYSICAL EXAMINATION
GEN: Interactive, cachetic.   HEAD AND NECK: NC/AT. Airway patent. Neck supple.   EYES:  Clear b/l. EOMI. PERRL.   ENT: Dry mucus membranes.   CARDIAC: Regular rate, regular rhythm. No evident pedal edema.    RESP/CHEST: Normal respiratory effort with no use of accessory muscles or retractions. Clear throughout on auscultation.  ABD: Soft, + mild distension, + diffuse TTP.   EXTREMITIES: Moving all extremities with no apparent deformities.   SKIN: Warm, dry, intact normal color. No rash.   NEURO: AOx3, CN II-XII grossly intact, no focal deficits.   PSYCH: Appropriate mood and affect.

## 2023-11-30 NOTE — H&P ADULT - NSHPLABSRESULTS_GEN_ALL_CORE
11.2   17.95 )-----------( 105      ( 30 Nov 2023 16:48 )             31.9   11-30    129<L>  |  96  |  76<H>  ----------------------------<  106<H>  4.5   |  21<L>  |  1.85<H>    Ca    7.8<L>      30 Nov 2023 16:48  Mg     2.0     11-30    TPro  5.2<L>  /  Alb  2.0<L>  /  TBili  0.8  /  DBili  x   /  AST  169<H>  /  ALT  51  /  AlkPhos  599<H>  11-30    Chest x-ray 11/30/23  IMPRESSION:  Right-sided port is new.  There are scattered infiltrates in the left mid lower lung at this time.    CT chest without contrast 11/30/23  IMPRESSION:  There is no pneumonia.  Findings suggestive of bilateral pulmonary metastasis.    CT A/P without contrast 11/30/23  FINDINGS:  LOWER CHEST: Nodular opacities at the lung bases. Partially imaged catheter tip terminating in the right atrium.    LIVER: Nodular liver contour. Numerous hepatic masses, consistent with metastases given history of intestinal cancer. Largest mass is within the left hepatic lobe measures 7.9 x 4.4 cm. A right hepatic lobe mass measures 4.4 x 2.8 cm.  BILE DUCTS: Normal caliber.  GALLBLADDER: Within normal limits.  SPLEEN: Mildly enlarged.  PANCREAS: Diffuse parenchymal atrophy.  ADRENALS: Within normal limits.  KIDNEYS/URETERS: Within normal limits.    BLADDER: Within normal limits.  REPRODUCTIVE ORGANS: Bilobed right adnexal cyst measuring 3.2 cm.    BOWEL: Gastrojejunostomy. No bowel obstruction. Wall thickened loops of small bowel. Wall thickening of the right hemicolon and transverse colon.  PERITONEUM: Large volume ascites.  VESSELS: Atherosclerotic change.  RETROPERITONEUM/LYMPH NODES: Enlarged retroperitoneal/upper abdominal and right pelvic lymph nodes.  ABDOMINAL WALL: Within normal limits.  BONES: Within normal limits.    IMPRESSION:  1.  Hepatic metastatic disease.  2.  Wall-thickened small bowel and ascending/transverse colon suggests enterocolitis. No bowel obstruction.  3.  Large volume ascites.

## 2023-11-30 NOTE — ED ADULT TRIAGE NOTE - CHIEF COMPLAINT QUOTE
BIBA for generalize weakness , fatigue and abdominal pain.  denies nausea, vomiting or diarrhea. history of intestinal cancer. bp 86/56 at triage. BIBA for generalize weakness , fatigue and abdominal pain.  denies nausea, vomiting or diarrhea. history of intestinal cancer on chemo. as per daughter, pt took morphine and xanax this morning.  bp 86/56 at triage.

## 2023-11-30 NOTE — H&P ADULT - NSICDXPASTMEDICALHX_GEN_ALL_CORE_FT
PAST MEDICAL HISTORY:  Adenocarcinoma of small intestine, stage 4     Anxiety     COPD (chronic obstructive pulmonary disease)     HLD (hyperlipidemia)     Hypertension      PAST MEDICAL HISTORY:  Adenocarcinoma of small intestine, stage 4     Anxiety     COPD (chronic obstructive pulmonary disease)     Hypertension

## 2023-11-30 NOTE — ED PROVIDER NOTE - CLINICAL SUMMARY MEDICAL DECISION MAKING FREE TEXT BOX
63F hx stage IV intestinal CA w/ mets pw diffuse cramping abd pain, weakness onset this AM. + hypotensive, otherwise VS stable / WNL. Pt cachetic, frail appearing, pallor. Abd soft, distended, diffuse TTP. Plan for CBC, CMP, lipase, mag, lactate, T&S, UA/C, BCs, CT AP w/ contrast. Give IVF, pain control. Re-eval. Anticipate admission. 63F hx stage IV intestinal CA w/ mets pw diffuse cramping abd pain, weakness onset this AM. + hypotensive, otherwise VS stable / WNL. Pt cachetic, frail appearing. Abd soft, distended, diffuse TTP. Plan for CBC, CMP, lipase, mag, lactate, T&S, UA/C, BCs, CT AP w/ contrast. Give IVF, pain control. Re-eval. Anticipate admission.  W/u significant for: + leukocytosis to 17.9, Cr 1.85, lactate 2.1 (daughter w/ lab results from 11/24 on phone: prior WBC 16, Cr 1.5) CT chest / AP w/ pulmonary & hepatic mets, enterocolitis.   On re-eval, pt w/ clinical improvement. Will admit to Tele (d/w Dr Ramsay). Pt, daughter updated to results, admission. They understand / agree w/ this plan.

## 2023-11-30 NOTE — ED ADULT NURSE REASSESSMENT NOTE - NS ED NURSE REASSESS COMMENT FT1
Albumin administration delayed for consent. Awaiting admitting doctor to come to the ED for consent.

## 2023-11-30 NOTE — H&P ADULT - HISTORY OF PRESENT ILLNESS
Veronica Napoles is a 63 year old female with PMHx of stage IV duodenal CA (s/p gastrojejunostomy in 3/2023, on chemotherapy since 4/2023) *** who presented to the ED on 11/30/23 for complaints of lethargy and hypotension.      In the ED, afebrile, HR as elevated as 104, BP as low as 86/56. WBC 17.95K, hgb 11.2, plts 105K, sodium 129, bicarb 21, BUN 76, Cr 1.85, albumin 2.0. Lactic acid 2.1. CT chest without PNA but with evidence of pulmonary metastases. CT A/P with hepatic metastases, wall-thickened  Veronica Napoles is a 63 year old female with PMHx of HTN, anxiety, COPD, esophagitis, and stage IV duodenal CA (s/p gastrojejunostomy in 3/2023, on chemotherapy since 4/2023) who presented to the ED on 11/30/23 for complaints of lethargy and hypotension.    History primarily obtained from daughter, Milargo at bedside. Daughter reports she changed patient into her pajamas this morning before she left for work around 9:45 AM. Patient was also up to the bathroom three times by herself. When daughter got home around 1 PM, patient was lethargic and described as incoherent. Blood pressure was low when daughter checked. Called EMS to bring to the ER for further evaluation. Of note, patient has been consuming soft pureed foods and ice cream with protein powder. Daughter is worried that patient has been steadily loosing weight and requested nutrition consultation.    Of note, patient started chemotherapy on April 2023 with FOLOFOX and CEA was being trended. Initially downtrended so oncologist stated treatment was working. Recently got bloodwork with  in November 2023 so plan is to switch treatment to irinotecan, leucovorin, 5-FU, and Mvasi immunotherapy on 12/7/23. Last chemotherapy session was on 11/1/23 and supposed to receive every two weeks. Also received Neupogen with chemotherapy infusion. However, given recurrent abdominal ascites, patient has required three therapeutic paracenteses within the past two weeks. This resulted in inability to attend chemotherapy session due to frequent reaccumulation of ascites.     In the ED, afebrile, HR as elevated as 104, BP as low as 86/56. WBC 17.95K, hgb 11.2, plts 105K, sodium 129, bicarb 21, BUN 76, Cr 1.85, albumin 2.0, alkphos 599, , ALT 51. Lactic acid 2.1. CT chest without PNA but with evidence of pulmonary metastases. CT A/P with hepatic metastases, wall-thickened  small bowel and ascending/transverse colon suggests enterocolitis. No bowel obstruction. Large volume ascites. Received 2L NS bolus and morphine 2 mg IV.

## 2023-11-30 NOTE — H&P ADULT - NSHPPHYSICALEXAM_GEN_ALL_CORE
T(C): 37.2 (11-30-23 @ 14:45), Max: 37.2 (11-30-23 @ 14:45)  HR: 103 (11-30-23 @ 19:24) (91 - 104)  BP: 100/74 (11-30-23 @ 19:24) (85/72 - 109/79)  RR: 14 (11-30-23 @ 19:24) (13 - 18)  SpO2: 97% (11-30-23 @ 17:00) (96% - 99%)    CONSTITUTIONAL: Well groomed, cachetic, temporal wasting noted  EYES: PERRLA and symmetric, EOMI  ENMT: Oral mucosa with dry membranes  RESP: No respiratory distress, no use of accessory muscles, diminished breath sounds b/l  CV: tachycardic, R. chest wall port without surrounding erythema  GI: Soft, NT, distended

## 2023-11-30 NOTE — H&P ADULT - ASSESSMENT
Veronica Napoles is a 63 year old female with PMHx of HTN, anxiety, COPD, esophagitis, and stage IV duodenal CA (s/p gastrojejunostomy in 3/2023, on chemotherapy since 4/2023) who presented to the ED on 11/30/23 for complaints of lethargy and hypotension and admitted for abdominal ascites and elevated creatinine on CKD.    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 permanent paradrain given quick reaccumulation    Elevated creatinine on CKD?, likely etiology prerenal secondary to decreased PO intake  Hyponatremia likely secondary to above  Cr 1.85 on admission, outpatient labs reveal Cr 1.61 (on 11/24/23), sodium 129 on admission  S/p 2L NS bolus in the ED  Avoid nephrotoxins, renally dose meds  Encourage PO hydration  Monitor renal function    Sepsis, possibly secondary to enterocolitis  Differential includes viral vs. bacterial  Admits to abdominal cramping (not new), denies nausea, vomiting, or diarrhea   and WBC 17.95K on admission, leukocytosis is multifactorial given recently received Neupogen and outpatient labs reveal WBC 16.4K (on 11/24/23)  CT A/P with wall-thickened small bowel and ascending/transverse colon suggests enterocolitis  Will hold off on antibiotics at this time given asymptomatic  Monitor fever curve and WBC trend    Elevated alkaline phosphatase and transaminitis, suspect secondary to hepatic metastases  Alkphos 599, , ALT 51 on admission  CT A/P with nodular liver contour. Numerous hepatic masses, consistent with metastases given history of intestinal cancer. Largest mass is within the left hepatic lobe measures 7.9 x 4.4 cm. A right hepatic lobe mass measures 4.4 x 2.8 cm  Avoid hepatotoxic agents  Monitor LFTs and f/u hepatitis panel    Hypoalbuminemia  In pt with failure to thrive  Albumin 2.0 on admission  Currently eating soft pureed foods only  Nutrition services consulted, please call daughterMilagro ([phone]) when nutritionist is performing evaluation as per daughter request      Chronic medical conditions:   HTN: PTA amlodipine-benzapril and HCTZ held due to borderline hypotension at this time and antihypertensives stopped about a week ago as per daughter  COPD: PTA albuterol PRN, montelukast  Anxiety: PTA alprazolam PRN, quetiapine  Esophagitis: as seen on EGD in 3/2023, PTA sucralfate, pantoprazole  Stage IV duodenal CA: metastases to liver and lungs, follows outpatient with [facility], plan for new treatment (as listed above in HPI) on 12/7/23    Medication reconciliation completed using discharge med rec from [facility] on 3/10/23 as patient does not have her medication list with her. Please confirm in AM. Plan of care discussed with daughter and  at bedside. Veronica Napoles is a 63 year old female with PMHx of HTN, anxiety, COPD, esophagitis, and stage IV duodenal CA (s/p gastrojejunostomy in 3/2023, on chemotherapy since 4/2023) who presented to the ED on 11/30/23 for complaints of lethargy and hypotension and admitted for abdominal ascites and elevated creatinine on CKD.    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 permanent paradrain given quick reaccumulation    Elevated creatinine on CKD?, likely etiology prerenal secondary to decreased PO intake  Hyponatremia and NAGMA likely secondary to above  BUN 76 and Cr 1.85 on admission, outpatient labs reveal Cr 1.61 (on 11/24/23), sodium 129 on admission  S/p 2L NS bolus in the ED  Avoid nephrotoxins, renally dose meds  Encourage PO hydration  Monitor renal function    Sepsis, possibly secondary to enterocolitis  Differential includes viral vs. bacterial  Admits to abdominal cramping (not new), denies nausea, vomiting, or diarrhea   and WBC 17.95K on admission, leukocytosis is multifactorial given recently received Neupogen and outpatient labs reveal WBC 16.4K (on 11/24/23)  CT A/P with wall-thickened small bowel and ascending/transverse colon suggests enterocolitis  Will hold off on antibiotics at this time given asymptomatic  F/u blood cultures, urine culture  Monitor fever curve and WBC trend    Lactic acidosis secondary to above, resolved  Lactic acid 2.1 on admission  S/p 2L NS bolus in the ED    Elevated alkaline phosphatase and transaminitis, suspect secondary to hepatic metastases  Alkphos 599, , ALT 51 on admission  CT A/P with nodular liver contour. Numerous hepatic masses, consistent with metastases given history of intestinal cancer. Largest mass is within the left hepatic lobe measures 7.9 x 4.4 cm. A right hepatic lobe mass measures 4.4 x 2.8 cm  Avoid hepatotoxic agents  Monitor LFTs and f/u hepatitis panel    Hypoalbuminemia  In pt with failure to thrive  Albumin 2.0 on admission  Currently eating soft pureed foods only  Nutrition services consulted, please call daughter, Milagro ([phone]) when nutritionist is performing evaluation as per daughter request      Chronic medical conditions:   HTN: PTA amlodipine-benzapril and HCTZ held due to borderline hypotension at this time and antihypertensives stopped about a week ago as per daughter  COPD: PTA albuterol PRN, montelukast  Anxiety: PTA alprazolam PRN, quetiapine  Esophagitis: as seen on EGD in 3/2023, PTA sucralfate, pantoprazole  Stage IV duodenal CA: metastases to liver and lungs, follows outpatient with [facility], plan for new treatment (as listed above in HPI) on 12/7/23    Medication reconciliation completed using discharge med rec from [facility] on 3/10/23 as patient does not have her medication list with her. Please confirm in AM. Plan of care discussed with daughter and  at bedside.

## 2023-11-30 NOTE — ED ADULT NURSE REASSESSMENT NOTE - NS ED NURSE REASSESS COMMENT FT1
Pt received from TREVER Ruiz. Updated pt and daughter on plan of care. No complaints at this time. Safety maintained, on cardiac monitor. Pt received from TREVER Ruiz. AOx3, on cardiac monitor. Updated pt and daughter on plan of care. No complaints at this time. Safety maintained.

## 2023-11-30 NOTE — ED ADULT NURSE NOTE - NSFALLHARMRISKINTERV_ED_ALL_ED
Assistance OOB with selected safe patient handling equipment if applicable/Assistance with ambulation/Communicate risk of Fall with Harm to all staff, patient, and family/Monitor gait and stability/Provide visual cue: red socks, yellow wristband, yellow gown, etc/Reinforce activity limits and safety measures with patient and family/Bed in lowest position, wheels locked, appropriate side rails in place/Call bell, personal items and telephone in reach/Instruct patient to call for assistance before getting out of bed/chair/stretcher/Non-slip footwear applied when patient is off stretcher/[place] to call system/Physically safe environment - no spills, clutter or unnecessary equipment/Purposeful Proactive Rounding/Room/bathroom lighting operational, light cord in reach

## 2023-11-30 NOTE — ED ADULT NURSE NOTE - CHIEF COMPLAINT QUOTE
BIBA for generalize weakness , fatigue and abdominal pain.  denies nausea, vomiting or diarrhea. history of intestinal cancer on chemo. as per daughter, pt took morphine and xanax this morning.  bp 86/56 at triage.

## 2023-12-01 NOTE — PROGRESS NOTE ADULT - ASSESSMENT
Veronica Napoles is a 63 year old female with PMHx of HTN, anxiety, COPD, esophagitis, and stage IV duodenal CA (s/p gastrojejunostomy in 3/2023, on chemotherapy since 4/2023) who presented to the ED on 11/30/23 for complaints of lethargy and hypotension and admitted for abdominal ascites and elevated creatinine on CKD.    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 permanent paradrain given quick reaccumulation    Elevated creatinine on CKD?, likely etiology prerenal secondary to decreased PO intake  Hyponatremia and NAGMA likely secondary to above  BUN 76 and Cr 1.85 on admission, outpatient labs reveal Cr 1.61 (on 11/24/23), sodium 129 on admission  S/p 2L NS bolus in the ED  Avoid nephrotoxins, renally dose meds  Encourage PO hydration  Monitor renal function    Sepsis, possibly secondary to enterocolitis  Differential includes viral vs. bacterial  Admits to abdominal cramping (not new), denies nausea, vomiting, or diarrhea   and WBC 17.95K on admission, leukocytosis is multifactorial given recently received Neupogen and outpatient labs reveal WBC 16.4K (on 11/24/23)  CT A/P with wall-thickened small bowel and ascending/transverse colon suggests enterocolitis  Will hold off on antibiotics at this time given asymptomatic  F/u blood cultures, urine culture  Monitor fever curve and WBC trend    Lactic acidosis secondary to above, resolved  Lactic acid 2.1 on admission  S/p 2L NS bolus in the ED    Elevated alkaline phosphatase and transaminitis, suspect secondary to hepatic metastases  Alkphos 599, , ALT 51 on admission  CT A/P with nodular liver contour. Numerous hepatic masses, consistent with metastases given history of intestinal cancer. Largest mass is within the left hepatic lobe measures 7.9 x 4.4 cm. A right hepatic lobe mass measures 4.4 x 2.8 cm  Avoid hepatotoxic agents  Monitor LFTs and f/u hepatitis panel    Hypoalbuminemia  In pt with failure to thrive  Albumin 2.0 on admission  Currently eating soft pureed foods only  Nutrition services consulted, please call daughter, Milagro ([phone]) when nutritionist is performing evaluation as per daughter request      Chronic medical conditions:   HTN: PTA amlodipine-benzapril and HCTZ held due to borderline hypotension at this time and antihypertensives stopped about a week ago as per daughter  COPD: PTA albuterol PRN, montelukast  Anxiety: PTA alprazolam PRN, quetiapine  Esophagitis: as seen on EGD in 3/2023, PTA sucralfate, pantoprazole  Stage IV duodenal CA: metastases to liver and lungs, follows outpatient [facility], plan for new treatment (as listed above in HPI) on 12/7/23    Medication reconciliation completed using discharge med rec from [facility] on 3/10/23 as patient does not have her medication list with her. Please confirm in AM. Plan of care discussed with daughter and  at bedside. Veronica Napoles is a 63 year old female with PMHx of HTN, anxiety, COPD, esophagitis, and stage IV duodenal CA (s/p gastrojejunostomy in 3/2023, on chemotherapy since 4/2023) who presented to the ED on 11/30/23 for complaints of lethargy and hypotension and admitted for abdominal ascites and elevated creatinine on CKD.    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 permanent paradrain given quick reaccumulation    Elevated creatinine on CKD?, likely etiology prerenal secondary to decreased PO intake  Hyponatremia and NAGMA likely secondary to above  BUN 76 and Cr 1.85 on admission, outpatient labs reveal Cr 1.61 (on 11/24/23), sodium 129 on admission  S/p 2L NS bolus in the ED  Avoid nephrotoxins, renally dose meds  Encourage PO hydration  Monitor renal function    Sepsis, possibly secondary to enterocolitis  Differential includes viral vs. bacterial  Admits to abdominal cramping (not new), denies nausea, vomiting, or diarrhea   and WBC 17.95K on admission, leukocytosis is multifactorial given recently received Neupogen and outpatient labs reveal WBC 16.4K (on 11/24/23)  CT A/P with wall-thickened small bowel and ascending/transverse colon suggests enterocolitis  Will hold off on antibiotics at this time given asymptomatic  F/u blood cultures, urine culture  Monitor fever curve and WBC trend    Lactic acidosis secondary to above, resolved  Lactic acid 2.1 on admission  S/p 2L NS bolus in the ED    Elevated alkaline phosphatase and transaminitis, suspect secondary to hepatic metastases  Alkphos 599, , ALT 51 on admission  CT A/P with nodular liver contour. Numerous hepatic masses, consistent with metastases given history of intestinal cancer. Largest mass is within the left hepatic lobe measures 7.9 x 4.4 cm. A right hepatic lobe mass measures 4.4 x 2.8 cm  Avoid hepatotoxic agents  Monitor LFTs and f/u hepatitis panel    Hypoalbuminemia  In pt with failure to thrive  Albumin 2.0 on admission  Currently eating soft pureed foods only  Nutrition services consulted, please call daughter, Milagro ([phone]) when nutritionist is performing evaluation as per daughter request      Chronic medical conditions:   HTN: PTA amlodipine-benzapril and HCTZ held due to borderline hypotension at this time and antihypertensives stopped about a week ago as per daughter  COPD: PTA albuterol PRN, montelukast  Anxiety: PTA alprazolam PRN, quetiapine  Esophagitis: as seen on EGD in 3/2023, PTA sucralfate, pantoprazole  Stage IV duodenal CA: metastases to liver and lungs, follows outpatient [facility], plan for new treatment (as listed above in HPI) on 12/7/23    Medication reconciliation completed using discharge med rec from [facility] on 3/10/23 as patient does not have her medication list with her. Please confirm in AM. Plan of care discussed with daughter and  at bedside.    Plan: IR paracentesis on Monday. Limited abdominal sonogram ordered.  Veronica Napoles is a 63 year old female with PMHx of HTN, anxiety, COPD, esophagitis, and stage IV duodenal CA (s/p gastrojejunostomy in 3/2023, on chemotherapy since 4/2023) who presented to the ED on 11/30/23 for complaints of lethargy and hypotension and admitted for abdominal ascites and elevated creatinine on CKD.    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 permanent paradrain given quick reaccumulation    Elevated creatinine on CKD?, likely etiology prerenal secondary to decreased PO intake  Hyponatremia and NAGMA likely secondary to above  BUN 76 and Cr 1.85 on admission, outpatient labs reveal Cr 1.61 (on 11/24/23), sodium 129 on admission  S/p 2L NS bolus in the ED  Avoid nephrotoxins, renally dose meds  Encourage PO hydration  Monitor renal function    Sepsis, possibly secondary to enterocolitis  Differential includes viral vs. bacterial  Admits to abdominal cramping (not new), denies nausea, vomiting, or diarrhea   and WBC 17.95K on admission, leukocytosis is multifactorial given recently received Neupogen and outpatient labs reveal WBC 16.4K (on 11/24/23)  CT A/P with wall-thickened small bowel and ascending/transverse colon suggests enterocolitis  Will hold off on antibiotics at this time given asymptomatic  F/u blood cultures, urine culture  Monitor fever curve and WBC trend    Lactic acidosis secondary to above, resolved  Lactic acid 2.1 on admission  S/p 2L NS bolus in the ED    Elevated alkaline phosphatase and transaminitis, suspect secondary to hepatic metastases  Alkphos 599, , ALT 51 on admission  CT A/P with nodular liver contour. Numerous hepatic masses, consistent with metastases given history of intestinal cancer. Largest mass is within the left hepatic lobe measures 7.9 x 4.4 cm. A right hepatic lobe mass measures 4.4 x 2.8 cm  Avoid hepatotoxic agents  Monitor LFTs and f/u hepatitis panel    Hypoalbuminemia  In pt with failure to thrive  Albumin 2.0 on admission  Currently eating soft pureed foods only  Nutrition services consulted, please call daughter, Milagro ([phone]) when nutritionist is performing evaluation as per daughter request      Chronic medical conditions:   HTN: PTA amlodipine-benzapril and HCTZ held due to borderline hypotension at this time and antihypertensives stopped about a week ago as per daughter  COPD: PTA albuterol PRN, montelukast  Anxiety: PTA alprazolam PRN, quetiapine  Esophagitis: as seen on EGD in 3/2023, PTA sucralfate, pantoprazole  Stage IV duodenal CA: metastases to liver and lungs, follows outpatient [facility], plan for new treatment (as listed above in HPI) on 12/7/23    Medication reconciliation completed using discharge med rec from [facility] on 3/10/23 as patient does not have her medication list with her. Please confirm in AM. Plan of care discussed with daughter and  at bedside.    Plan: IR paracentesis on Monday. Limited abdominal sonogram ordered. IR consult placed.

## 2023-12-01 NOTE — PROGRESS NOTE ADULT - SUBJECTIVE AND OBJECTIVE BOX
INTERVAL HPI/OVERNIGHT EVENTS:  Pt seen and examined at bedside.     Allergies/Intolerance: No Known Allergies      MEDICATIONS  (STANDING):  albumin human 25% IVPB 50 milliLiter(s) IV Intermittent every 6 hours  bisacodyl 5 milliGRAM(s) Oral at bedtime  montelukast 10 milliGRAM(s) Oral daily  pantoprazole    Tablet 40 milliGRAM(s) Oral before breakfast  QUEtiapine 100 milliGRAM(s) Oral at bedtime  senna 1 Tablet(s) Oral daily  sertraline 50 milliGRAM(s) Oral daily  sucralfate 1 Gram(s) Oral <User Schedule>    MEDICATIONS  (PRN):  acetaminophen     Tablet .. 650 milliGRAM(s) Oral every 6 hours PRN Temp greater or equal to 38C (100.4F), Mild Pain (1 - 3)  albuterol    90 MICROgram(s) HFA Inhaler 2 Puff(s) Inhalation every 6 hours PRN Shortness of Breath and/or Wheezing  ALPRAZolam 0.5 milliGRAM(s) Oral three times a day PRN anxiety  aluminum hydroxide/magnesium hydroxide/simethicone Suspension 30 milliLiter(s) Oral every 4 hours PRN Dyspepsia  melatonin 3 milliGRAM(s) Oral at bedtime PRN Insomnia  ondansetron Injectable 4 milliGRAM(s) IV Push every 8 hours PRN Nausea and/or Vomiting  QUEtiapine 50 milliGRAM(s) Oral daily PRN anxiety        ROS: all systems reviewed and wnl      PHYSICAL EXAMINATION:  Vital Signs Last 24 Hrs  T(C): 36.9 (01 Dec 2023 03:55), Max: 37.2 (30 Nov 2023 14:45)  T(F): 98.5 (01 Dec 2023 03:55), Max: 98.9 (30 Nov 2023 14:45)  HR: 97 (01 Dec 2023 03:55) (91 - 106)  BP: 108/76 (01 Dec 2023 03:55) (85/72 - 109/79)  BP(mean): 80 (01 Dec 2023 01:45) (80 - 88)  RR: 16 (01 Dec 2023 03:55) (12 - 18)  SpO2: 98% (01 Dec 2023 03:55) (93% - 99%)    Parameters below as of 01 Dec 2023 03:55  Patient On (Oxygen Delivery Method): room air      CAPILLARY BLOOD GLUCOSE      POCT Blood Glucose.: 106 mg/dL (30 Nov 2023 14:46)        GENERAL: stable, no fevers.   NECK: supple, No JVD  CHEST/LUNG: clear to auscultation bilaterally; no rales, rhonchi, or wheezing b/l  HEART: normal S1, S2  ABDOMEN: BS+, soft, ND, NT   EXTREMITIES:  pulses palpable; no clubbing, cyanosis, or edema b/l LEs  SKIN: no rashes or lesions      LABS:                        11.4   21.30 )-----------( 100      ( 01 Dec 2023 07:30 )             32.8     12-01    131<L>  |  99  |  73<H>  ----------------------------<  88  4.4   |  15<L>  |  1.65<H>    Ca    8.4<L>      01 Dec 2023 07:30  Mg     2.0     11-30    TPro  5.4<L>  /  Alb  2.3<L>  /  TBili  1.1  /  DBili  0.5<H>  /  AST  142<H>  /  ALT  47  /  AlkPhos  525<H>  12-01      Urinalysis Basic - ( 01 Dec 2023 07:30 )    Color: x / Appearance: x / SG: x / pH: x  Gluc: 88 mg/dL / Ketone: x  / Bili: x / Urobili: x   Blood: x / Protein: x / Nitrite: x   Leuk Esterase: x / RBC: x / WBC x   Sq Epi: x / Non Sq Epi: x / Bacteria: x

## 2023-12-01 NOTE — CHART NOTE - NSCHARTNOTEFT_GEN_A_CORE
EVENT: Received telephone call from RN that pt is very nauseated.    HPI: Veronica Napoles is a 63 year old female with PMHx of HTN, anxiety, COPD, esophagitis, and stage IV duodenal CA (s/p gastrojejunostomy in 3/2023, on chemotherapy since 4/2023) who presented to the ED on 11/30/23 for complaints of lethargy and hypotension and admitted for abdominal ascites and elevated creatinine on CKD.    SUBJECTIVE: "I'm very nauseated"    OBJECTIVE:  Vital Signs Last 24 Hrs  T(C): 36.3 (01 Dec 2023 18:42), Max: 37 (01 Dec 2023 11:05)  T(F): 97.4 (01 Dec 2023 18:42), Max: 98.6 (01 Dec 2023 11:05)  HR: 84 (01 Dec 2023 18:42) (84 - 109)  BP: 117/80 (01 Dec 2023 18:42) (88/75 - 117/80)  BP(mean): 80 (01 Dec 2023 01:45) (80 - 88)  RR: 18 (01 Dec 2023 18:42) (12 - 18)  SpO2: 97% (01 Dec 2023 18:42) (93% - 99%)    Parameters below as of 01 Dec 2023 11:05  Patient On (Oxygen Delivery Method): room air      FOCUSED PHYSICAL EXAM:  Neuro: awake, alert, oriented x 3. No neuro deficit  Cardiovascular: Pulses +2 B/L in lower and upper extremities, HR regular, BP stable, No edema.  Respiratory: Respirations regular, unlabored, breath sounds clear B/L.   GI: Abdomen soft, non-tender, positive bowel sounds.  : no bladder distention noted. No complaints at this time.  Skin: Dry, intact, no bruising, no diaphoresis.    LABS:                        11.4   21.30 )-----------( 100      ( 01 Dec 2023 07:30 )             32.8     12-01    131<L>  |  99  |  73<H>  ----------------------------<  88  4.4   |  15<L>  |  1.65<H>    Ca    8.4<L>      01 Dec 2023 07:30  Mg     2.0     11-30    TPro  5.4<L>  /  Alb  2.3<L>  /  TBili  1.1  /  DBili  0.5<H>  /  AST  142<H>  /  ALT  47  /  AlkPhos  525<H>  12-01      EKG:   IMAGING:    ASSESSMENT/PROBLEM: Nausea most likely 2/2 to chemotherapy      PLAN:   1. Zofran 4 mg, I EVENT: Received telephone call from RN that pt is very nauseated.    HPI: Veronica Napoles is a 63 year old female with PMHx of HTN, anxiety, COPD, esophagitis, and stage IV duodenal CA (s/p gastrojejunostomy in 3/2023, on chemotherapy since 4/2023) who presented to the ED on 11/30/23 for complaints of lethargy and hypotension and admitted for abdominal ascites and elevated creatinine on CKD.    SUBJECTIVE: "I'm very nauseated"    OBJECTIVE:  Vital Signs Last 24 Hrs  T(C): 36.3 (01 Dec 2023 18:42), Max: 37 (01 Dec 2023 11:05)  T(F): 97.4 (01 Dec 2023 18:42), Max: 98.6 (01 Dec 2023 11:05)  HR: 84 (01 Dec 2023 18:42) (84 - 109)  BP: 117/80 (01 Dec 2023 18:42) (88/75 - 117/80)  BP(mean): 80 (01 Dec 2023 01:45) (80 - 88)  RR: 18 (01 Dec 2023 18:42) (12 - 18)  SpO2: 97% (01 Dec 2023 18:42) (93% - 99%)    Parameters below as of 01 Dec 2023 11:05  Patient On (Oxygen Delivery Method): room air      FOCUSED PHYSICAL EXAM:  Neuro: awake, alert, oriented x 3. No neuro deficit  Cardiovascular: Pulses +2 B/L in lower and upper extremities, HR regular, BP stable, No edema.  Respiratory: Respirations regular, unlabored, breath sounds clear B/L.   GI: Abdomen soft, non-tender, positive bowel sounds.  : no bladder distention noted. No complaints at this time.  Skin: Dry, intact, no bruising, no diaphoresis.    LABS:                        11.4   21.30 )-----------( 100      ( 01 Dec 2023 07:30 )             32.8     12-01    131<L>  |  99  |  73<H>  ----------------------------<  88  4.4   |  15<L>  |  1.65<H>    Ca    8.4<L>      01 Dec 2023 07:30  Mg     2.0     11-30    TPro  5.4<L>  /  Alb  2.3<L>  /  TBili  1.1  /  DBili  0.5<H>  /  AST  142<H>  /  ALT  47  /  AlkPhos  525<H>  12-01      EKG:   IMAGING:    ASSESSMENT/PROBLEM: Nausea most likely 2/2 to chemotherapy      PLAN:   1. Zofran 4 mg, IVP ordered  2. Monitor response to treatment  3. Cont present care/treatment  4. Supportive care

## 2023-12-01 NOTE — PATIENT PROFILE ADULT - FALL HARM RISK - HARM RISK INTERVENTIONS

## 2023-12-01 NOTE — PATIENT PROFILE ADULT - FUNCTIONAL ASSESSMENT - DAILY ACTIVITY 3.
Pt ambulated to br,unable to void.  Encouraged to drink fluids,iv fluids continue.   2 = A lot of assistance

## 2023-12-02 NOTE — DIETITIAN INITIAL EVALUATION ADULT - ETIOLOGY
Inadequate energy /protein intake ; increased energy/protein needs related to Stage IV duodenal Cancer w/ mets

## 2023-12-02 NOTE — DIETITIAN INITIAL EVALUATION ADULT - PERTINENT LABORATORY DATA
12-02    131<L>  |  x   |  x   ----------------------------<  x   x    |  x   |  1.59<H>    Ca    8.4<L>      01 Dec 2023 07:30  Mg     2.0     11-30    TPro  x   /  Alb  x   /  TBili  1.3<H>  /  DBili  x   /  AST  x   /  ALT  x   /  AlkPhos  x   12-02

## 2023-12-02 NOTE — DIETITIAN INITIAL EVALUATION ADULT - PERTINENT MEDS FT
MEDICATIONS  (STANDING):  chlorhexidine 2% Cloths 1 Application(s) Topical daily  montelukast 10 milliGRAM(s) Oral daily  pantoprazole    Tablet 40 milliGRAM(s) Oral before breakfast  polyethylene glycol 3350 17 Gram(s) Oral two times a day  QUEtiapine 50 milliGRAM(s) Oral at bedtime  senna 2 Tablet(s) Oral at bedtime  sertraline 50 milliGRAM(s) Oral daily  sucralfate 1 Gram(s) Oral <User Schedule>    MEDICATIONS  (PRN):  acetaminophen     Tablet .. 650 milliGRAM(s) Oral every 6 hours PRN Temp greater or equal to 38C (100.4F), Mild Pain (1 - 3)  albuterol    90 MICROgram(s) HFA Inhaler 2 Puff(s) Inhalation every 6 hours PRN Shortness of Breath and/or Wheezing  ALPRAZolam 0.5 milliGRAM(s) Oral three times a day PRN anxiety  HYDROmorphone   Tablet 4 milliGRAM(s) Oral every 6 hours PRN Moderate Pain (4 - 6)  melatonin 3 milliGRAM(s) Oral at bedtime PRN Insomnia  ondansetron Injectable 4 milliGRAM(s) IV Push every 6 hours PRN Nausea and/or Vomiting

## 2023-12-02 NOTE — DIETITIAN INITIAL EVALUATION ADULT - OBTAIN CURRENT WEIGHT
PT requesting a call back at [phone] RE: Chills, Fluctuating Fever and slight cough - Negative Covid Test   yes

## 2023-12-02 NOTE — DIETITIAN INITIAL EVALUATION ADULT - NSICDXPASTMEDICALHX_GEN_ALL_CORE_FT
PAST MEDICAL HISTORY:  Adenocarcinoma of small intestine, stage 4     Anxiety     COPD (chronic obstructive pulmonary disease)     Hypertension

## 2023-12-02 NOTE — DIETITIAN INITIAL EVALUATION ADULT - REASON INDICATOR FOR ASSESSMENT
nutrition services ; MST score 2 or > (recent wt loss, unsure of wt loss, eating poorly w/ decreased appetite).

## 2023-12-02 NOTE — DIETITIAN INITIAL EVALUATION ADULT - IDEAL BODY WEIGHT (LBS)
Detail Level: Detailed Depth Of Biopsy: dermis Was A Bandage Applied: Yes Size Of Lesion In Cm: 0 Biopsy Type: H and E Biopsy Method: Dermablade Anesthesia Type: 1% lidocaine with epinephrine Anesthesia Volume In Cc (Will Not Render If 0): 0.5 Hemostasis: Monsel's and Electrocautery Wound Care: Petrolatum Dressing: Band-Aid Destruction After The Procedure: No Type Of Destruction Used: Curettage Curettage Text: The wound bed was treated with curettage after the biopsy was performed. Cryotherapy Text: The wound bed was treated with cryotherapy after the biopsy was performed. Electrodesiccation Text: The wound bed was treated with electrodesiccation after the biopsy was performed. 130 Electrodesiccation And Curettage Text: The wound bed was treated with electrodesiccation and curettage after the biopsy was performed. Silver Nitrate Text: The wound bed was treated with silver nitrate after the biopsy was performed. Lab: -[id number] Consent: Written consent was obtained and risks were reviewed including but not limited to scarring, infection, bleeding, scabbing, incomplete removal, nerve damage and allergy to anesthesia. Post-Care Instructions: I reviewed with the patient in detail post-care instructions. Patient is to keep the biopsy site dry overnight, and then apply bacitracin twice daily until healed. Patient may apply hydrogen peroxide soaks to remove any crusting. Notification Instructions: Patient will be notified of biopsy results. However, patient instructed to call the office if not contacted within 2 weeks. Billing Type: [employer] Information: Selecting Yes will display possible errors in your note based on the variables you have selected. This validation is only offered as a suggestion for you. PLEASE NOTE THAT THE VALIDATION TEXT WILL BE REMOVED WHEN YOU FINALIZE YOUR NOTE. IF YOU WANT TO FAX A PRELIMINARY NOTE YOU WILL NEED TO TOGGLE THIS TO 'NO' IF YOU DO NOT WANT IT IN YOUR FAXED NOTE.

## 2023-12-02 NOTE — PROGRESS NOTE ADULT - ASSESSMENT
Veronica Napoles is a 63 year old female with PMHx of HTN, anxiety, COPD, esophagitis, and stage IV duodenal CA (s/p gastrojejunostomy in 3/2023, on chemotherapy since 4/2023) who presented to the ED on 11/30/23 for complaints of lethargy and hypotension and admitted for abdominal ascites and elevated creatinine on CKD.    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 permanent paradrain given quick reaccumulation  Planned for Monday     Elevated creatinine on CKD?, likely etiology prerenal secondary to decreased PO intake  Hyponatremia and NAGMA likely secondary to above  BUN 76 and Cr 1.85 on admission, outpatient labs reveal Cr 1.61 (on 11/24/23), sodium 129 on admission  S/p 2L NS bolus in the ED  Avoid nephrotoxins, renally dose meds  Encourage PO hydration  Monitor renal function    Sepsis, possibly secondary to enterocolitis  Differential includes viral vs. bacterial  Admits to abdominal cramping (not new), denies nausea, vomiting, or diarrhea   and WBC 17.95K on admission, leukocytosis is multifactorial given recently received Neupogen and outpatient labs reveal WBC 16.4K (on 11/24/23)  CT A/P with wall-thickened small bowel and ascending/transverse colon suggests enterocolitis  Will hold off on antibiotics at this time given asymptomatic  F/u blood cultures, urine culture  Monitor fever curve and WBC trend    Lactic acidosis secondary to above, resolved  Lactic acid 2.1 on admission  S/p 2L NS bolus in the ED    Elevated alkaline phosphatase and transaminitis, suspect secondary to hepatic metastases  Alkphos 599, , ALT 51 on admission  CT A/P with nodular liver contour. Numerous hepatic masses, consistent with metastases given history of intestinal cancer. Largest mass is within the left hepatic lobe measures 7.9 x 4.4 cm. A right hepatic lobe mass measures 4.4 x 2.8 cm  Avoid hepatotoxic agents  Monitor LFTs     Hypoalbuminemia  In pt with failure to thrive  Albumin 2.0 on admission  Currently eating soft pureed foods only  Nutrition services consulted      Chronic medical conditions:   HTN: PTA amlodipine-benzapril and HCTZ held due to borderline hypotension at this time and antihypertensives stopped about a week ago as per daughter  COPD: PTA albuterol PRN, montelukast  Anxiety: PTA alprazolam PRN, quetiapine  Esophagitis: as seen on EGD in 3/2023, PTA sucralfate, pantoprazole  Stage IV duodenal CA: metastases to liver and lungs, follows outpatient with [facility], plan for new treatment (as listed above in HPI) on 12/7/23    Medication reconciliation completed using discharge med rec from [facility] on 3/10/23 as patient does not have her medication list with her.    Plan: IR paracentesis on Monday. Limited abdominal sonogram ordered. IR consult placed.

## 2023-12-02 NOTE — DIETITIAN NUTRITION RISK NOTIFICATION - TREATMENT: THE FOLLOWING DIET HAS BEEN RECOMMENDED
Diet, Soft and Bite Sized:   Low Sodium (12-02-23 @ 12:08) [Pending Verification By Attending]  Diet, Pureed (11-30-23 @ 20:37) [Active]

## 2023-12-02 NOTE — DIETITIAN INITIAL EVALUATION ADULT - OTHER INFO
Pt seen on medical floor, adm w/ abd ascites, elevated creat on CKD ? sepsis , possibly due to enterocolitis. Pt w/ stage IV duodenal CA, w/ hepatic and pulmonary metastases. s/p gastrojejunostomy 3/20/23. Pt prefers tea, dislikes ensure -> preferences provided to diet office. Pt is willing to try [employer] standard.  at bedside providing some information as pt was lethargic. Denies V/D/C/Chewing/Swallowing issues, No food allergies , (+) wt loss x 8 months before starting chemo. Pt lives with her  and daughter whom do the food shopping/cooking. Pt reports her height as 5'6" and UBW to be 140 #. Pt requests a change in her diet as she will not east the pureed food. Will provide soft and Bite sized as this is what she consumes at home. BMI < 19 on adm.

## 2023-12-02 NOTE — PROGRESS NOTE ADULT - SUBJECTIVE AND OBJECTIVE BOX
Patient is a 63y old  Female who presents with a chief complaint of ENTEROCOLITIS; LUDA ; HYPOTENSION     (02 Dec 2023 11:47)      INTERVAL HPI/OVERNIGHT EVENTS: overnight no acute events. Family upset para cannot be done until Monday. Informed them that IR is only here MWF. Patient denies any pain or shortness of breath. Spoke with daughter over bedside.     MEDICATIONS  (STANDING):  chlorhexidine 2% Cloths 1 Application(s) Topical daily  montelukast 10 milliGRAM(s) Oral daily  pantoprazole    Tablet 40 milliGRAM(s) Oral before breakfast  polyethylene glycol 3350 17 Gram(s) Oral two times a day  QUEtiapine 50 milliGRAM(s) Oral at bedtime  senna 2 Tablet(s) Oral at bedtime  sertraline 50 milliGRAM(s) Oral daily  sucralfate 1 Gram(s) Oral <User Schedule>    MEDICATIONS  (PRN):  acetaminophen     Tablet .. 650 milliGRAM(s) Oral every 6 hours PRN Temp greater or equal to 38C (100.4F), Mild Pain (1 - 3)  albuterol    90 MICROgram(s) HFA Inhaler 2 Puff(s) Inhalation every 6 hours PRN Shortness of Breath and/or Wheezing  ALPRAZolam 0.5 milliGRAM(s) Oral three times a day PRN anxiety  HYDROmorphone   Tablet 4 milliGRAM(s) Oral every 6 hours PRN Moderate Pain (4 - 6)  melatonin 3 milliGRAM(s) Oral at bedtime PRN Insomnia  ondansetron Injectable 4 milliGRAM(s) IV Push every 6 hours PRN Nausea and/or Vomiting      Allergies    No Known Allergies    Intolerances      ROS  10 point ROS negative, unless stated otherwise.    Vital Signs Last 24 Hrs  T(C): 36.3 (02 Dec 2023 15:36), Max: 36.9 (01 Dec 2023 23:33)  T(F): 97.4 (02 Dec 2023 15:36), Max: 98.5 (01 Dec 2023 23:33)  HR: 89 (02 Dec 2023 15:36) (84 - 99)  BP: 126/92 (02 Dec 2023 15:36) (100/68 - 126/92)  BP(mean): --  RR: 18 (02 Dec 2023 15:36) (16 - 18)  SpO2: 100% (02 Dec 2023 15:36) (96% - 100%)    Parameters below as of 02 Dec 2023 04:44  Patient On (Oxygen Delivery Method): room air        PHYSICAL EXAM:  GENERAL: NAD, thin, frail female   NECK: supple, No JVD  CHEST/LUNG: clear to auscultation bilaterally; no rales, rhonchi, or wheezing b/l  HEART: normal S1, S2  ABDOMEN: BS+, distended, soft   EXTREMITIES:  pulses palpable; no clubbing, cyanosis, or edema b/l LEs  SKIN: no rashes or lesions      LABS:                        11.4   21.30 )-----------( 100      ( 01 Dec 2023 07:30 )             32.8     12-02    131<L>  |  x   |  x   ----------------------------<  x   x    |  x   |  1.59<H>    Ca    8.4<L>      01 Dec 2023 07:30  Mg     2.0     11-30    TPro  x   /  Alb  x   /  TBili  1.3<H>  /  DBili  x   /  AST  x   /  ALT  x   /  AlkPhos  x   12-02    PT/INR - ( 02 Dec 2023 07:00 )   PT: 13.5 sec;   INR: 1.14 ratio           Urinalysis Basic - ( 01 Dec 2023 17:00 )    Color: Yellow / Appearance: Clear / SG: >1.030 / pH: x  Gluc: x / Ketone: Negative mg/dL  / Bili: Negative / Urobili: 1.0 mg/dL   Blood: x / Protein: Negative mg/dL / Nitrite: Negative   Leuk Esterase: Negative / RBC: x / WBC x   Sq Epi: x / Non Sq Epi: x / Bacteria: x      CAPILLARY BLOOD GLUCOSE          RADIOLOGY & ADDITIONAL TESTS:    Imaging Personally Reviewed:  [ X] YES  [ ] NO    Consultant(s) Notes Reviewed:  [ X] YES  [ ] NO    Care Discussed with Consultants/Other Providers [X ] YES  [ ] NO

## 2023-12-03 NOTE — PROGRESS NOTE ADULT - ASSESSMENT
Veronica Napoles is a 63 year old female with PMHx of HTN, anxiety, COPD, esophagitis, and stage IV duodenal CA (s/p gastrojejunostomy in 3/2023, on chemotherapy since 4/2023) who presented to the ED on 11/30/23 for complaints of lethargy and hypotension and admitted for abdominal ascites and elevated creatinine on CKD.    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 permanent paradrain given quick reaccumulation  Planned for Monday     Elevated creatinine on CKD?, likely etiology prerenal secondary to decreased PO intake  Hyponatremia and NAGMA likely secondary to above  BUN 76 and Cr 1.85 on admission, outpatient labs reveal Cr 1.61 (on 11/24/23), sodium 129 on admission  S/p 2L NS bolus in the ED  Avoid nephrotoxins, renally dose meds  Encourage PO hydration  Monitor renal function    Sepsis, possibly secondary to enterocolitis  Differential includes viral vs. bacterial  Admits to abdominal cramping (not new), denies nausea, vomiting, or diarrhea   and WBC 17.95K on admission, leukocytosis is multifactorial given recently received Neupogen and outpatient labs reveal WBC 16.4K (on 11/24/23)  CT A/P with wall-thickened small bowel and ascending/transverse colon suggests enterocolitis  Will hold off on antibiotics at this time given asymptomatic  F/u blood cultures, urine culture  Monitor fever curve and WBC trend    Lactic acidosis secondary to above, resolved  Lactic acid 2.1 on admission  S/p 2L NS bolus in the ED    Elevated alkaline phosphatase and transaminitis, suspect secondary to hepatic metastases  Alkphos 599, , ALT 51 on admission  CT A/P with nodular liver contour. Numerous hepatic masses, consistent with metastases given history of intestinal cancer. Largest mass is within the left hepatic lobe measures 7.9 x 4.4 cm. A right hepatic lobe mass measures 4.4 x 2.8 cm  Avoid hepatotoxic agents  Monitor LFTs     Hypoalbuminemia  In pt with failure to thrive  Albumin 2.0 on admission  Currently eating soft pureed foods only  Nutrition services consulted      Chronic medical conditions:   HTN: PTA amlodipine-benzapril and HCTZ held due to borderline hypotension at this time and antihypertensives stopped about a week ago as per daughter  COPD: PTA albuterol PRN, montelukast  Anxiety: PTA alprazolam PRN, quetiapine  Esophagitis: as seen on EGD in 3/2023, PTA sucralfate, pantoprazole  Stage IV duodenal CA: metastases to liver and lungs, follows outpatient with [facility], plan for new treatment (as listed above in HPI) on 12/7/23    Medication reconciliation completed using discharge med rec from [facility] on 3/10/23 as patient does not have her medication list with her.    Plan: IR paracentesis on Monday. Limited abdominal sonogram ordered. IR consult placed.  Veronica Napoles is a 63 year old female with PMHx of HTN, anxiety, COPD, esophagitis, and stage IV duodenal CA (s/p gastrojejunostomy in 3/2023, on chemotherapy since 4/2023) who presented to the ED on 11/30/23 for complaints of lethargy and hypotension and admitted for abdominal ascites and elevated creatinine on CKD.    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 permanent [place] given quick reaccumulation  Planned for Monday     Elevated creatinine on CKD?, likely etiology prerenal secondary to decreased PO intake  Hyponatremia and NAGMA likely secondary to above  BUN 76 and Cr 1.85 on admission, outpatient labs reveal Cr 1.61 (on 11/24/23), sodium 129 on admission  S/p 2L NS bolus in the ED  Avoid nephrotoxins, renally dose meds  Encourage PO hydration  Monitor renal function    Sepsis, possibly secondary to enterocolitis  Differential includes viral vs. bacterial  Admits to abdominal cramping (not new), denies nausea, vomiting, or diarrhea   and WBC 17.95K on admission, leukocytosis is multifactorial given recently received Neupogen and outpatient labs reveal WBC 16.4K (on 11/24/23)  CT A/P with wall-thickened small bowel and ascending/transverse colon suggests enterocolitis  Will hold off on antibiotics at this time given asymptomatic  F/u blood cultures, urine culture  Monitor fever curve and WBC trend    Lactic acidosis secondary to above, resolved  Lactic acid 2.1 on admission  S/p 2L NS bolus in the ED    Elevated alkaline phosphatase and transaminitis, suspect secondary to hepatic metastases  Alkphos 599, , ALT 51 on admission  CT A/P with nodular liver contour. Numerous hepatic masses, consistent with metastases given history of intestinal cancer. Largest mass is within the left hepatic lobe measures 7.9 x 4.4 cm. A right hepatic lobe mass measures 4.4 x 2.8 cm  Avoid hepatotoxic agents  Monitor LFTs     Hypoalbuminemia  In pt with failure to thrive  Albumin 2.0 on admission  Currently eating soft pureed foods only  Nutrition services consulted      Chronic medical conditions:   HTN: PTA amlodipine-benzapril and HCTZ held due to borderline hypotension at this time and antihypertensives stopped about a week ago as per daughter  COPD: PTA albuterol PRN, montelukast  Anxiety: PTA alprazolam PRN, quetiapine  Esophagitis: as seen on EGD in 3/2023, PTA sucralfate, pantoprazole  Stage IV duodenal CA: metastases to liver and lungs, follows outpatient with [facility], plan for new treatment (as listed above in HPI) on 12/7/23    Medication reconciliation completed using discharge med rec from [facility] on 3/10/23 as patient does not have her medication list with her.    Plan: IR paracentesis on Monday. Limited abdominal sonogram ordered. IR consult placed.  Veronica Napoles is a 63 year old female with PMHx of HTN, anxiety, COPD, esophagitis, and stage IV duodenal CA (s/p gastrojejunostomy in 3/2023, on chemotherapy since 4/2023) who presented to the ED on 11/30/23 for complaints of lethargy and hypotension and admitted for abdominal ascites and elevated creatinine on CKD.    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 permanent [place] given quick reaccumulation  Planned for Monday     Elevated creatinine on CKD?, likely etiology prerenal secondary to decreased PO intake  Hyponatremia and NAGMA likely secondary to above  BUN 76 and Cr 1.85 on admission, outpatient labs reveal Cr 1.61 (on 11/24/23), sodium 129 on admission  S/p 2L NS bolus in the ED  Avoid nephrotoxins, renally dose meds  Encourage PO hydration  Monitor renal function    Sepsis, possibly secondary to enterocolitis  Differential includes viral vs. bacterial  Admits to abdominal cramping (not new), denies nausea, vomiting, or diarrhea   and WBC 17.95K on admission, leukocytosis is multifactorial given recently received Neupogen and outpatient labs reveal WBC 16.4K (on 11/24/23)  CT A/P with wall-thickened small bowel and ascending/transverse colon suggests enterocolitis  Will hold off on antibiotics at this time given asymptomatic  F/u blood cultures, urine culture  Monitor fever curve and WBC trend    Lactic acidosis secondary to above, resolved  Lactic acid 2.1 on admission  S/p 2L NS bolus in the ED    Elevated alkaline phosphatase and transaminitis, suspect secondary to hepatic metastases  Alkphos 599, , ALT 51 on admission  CT A/P with nodular liver contour. Numerous hepatic masses, consistent with metastases given history of intestinal cancer. Largest mass is within the left hepatic lobe measures 7.9 x 4.4 cm. A right hepatic lobe mass measures 4.4 x 2.8 cm  Avoid hepatotoxic agents  Monitor LFTs     Hypoalbuminemia  In pt with failure to thrive  Albumin 2.0 on admission  Currently eating soft pureed foods only  Nutrition services consulted      Chronic medical conditions:   HTN: PTA amlodipine-benzapril and HCTZ held due to borderline hypotension at this time and antihypertensives stopped about a week ago as per daughter  COPD: PTA albuterol PRN, montelukast  Anxiety: PTA alprazolam PRN, quetiapine  Esophagitis: as seen on EGD in 3/2023, PTA sucralfate, pantoprazole  Stage IV duodenal CA: metastases to liver and lungs, follows outpatient with [facility], plan for new treatment (as listed above in HPI) on 12/7/23    Medication reconciliation completed using discharge med rec from [facility] on 3/10/23 as patient does not have her medication list with her.    Plan: IR paracentesis on Monday. Limited abdominal sonogram ordered. IR consult placed.  Veronica Napoles is a 63 year old female with PMHx of HTN, anxiety, COPD, esophagitis, and stage IV duodenal CA (s/p gastrojejunostomy in 3/2023, on chemotherapy since 4/2023) who presented to the ED on 11/30/23 for complaints of lethargy and hypotension and admitted for abdominal ascites and elevated creatinine on CKD.      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 permanent [place] given quick reaccumulation.    Paracentesis planned for Monday     Elevated creatinine on CKD?, likely etiology prerenal secondary to decreased PO intake  Hyponatremia and NAGMA likely secondary to above  BUN 76 and Cr 1.85 on admission, outpatient labs reveal Cr 1.61 (on 11/24/23), sodium 129 on admission  Monitor renal function    Sepsis, possibly secondary to enterocolitis  Differential includes viral vs. bacterial  Admits to abdominal cramping (not new), denies nausea, vomiting, or diarrhea   and WBC 17.95K on admission, leukocytosis is multifactorial given recently received Neupogen and outpatient labs reveal WBC 16.4K (on 11/24/23)  CT A/P with wall-thickened small bowel and ascending/transverse colon suggests enterocolitis  Will hold off on antibiotics at this time given asymptomatic  F/u blood cultures, urine culture  Monitor fever curve and WBC trend    Elevated alkaline phosphatase and transaminitis, suspect secondary to hepatic metastases  Alkphos 599, , ALT 51 on admission  CT A/P with nodular liver contour. Numerous hepatic masses, consistent with metastases given history of   Stage IV Duodenal cancer. Largest mass is within the left hepatic lobe measures 7.9 x 4.4 cm. A right hepatic lobe mass measures 4.4 x 2.8 cm.       Plan: IR paracentesis on Monday. Limited abdominal sonogram ordered. IR consult placed.     Discharge home after paracentesis.   Veronica Napoles is a 63 year old female with PMHx of HTN, anxiety, COPD, esophagitis, and stage IV duodenal CA (s/p gastrojejunostomy in 3/2023, on chemotherapy since 4/2023) who presented to the ED on 11/30/23 for complaints of lethargy and hypotension and admitted for abdominal ascites and elevated creatinine on CKD.      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 permanent [place] given quick reaccumulation.    Paracentesis planned for Monday     Elevated creatinine on CKD?, likely etiology prerenal secondary to decreased PO intake  Hyponatremia and NAGMA likely secondary to above  BUN 76 and Cr 1.85 on admission, outpatient labs reveal Cr 1.61 (on 11/24/23), sodium 129 on admission  Monitor renal function    Sepsis, possibly secondary to enterocolitis  Differential includes viral vs. bacterial  Admits to abdominal cramping (not new), denies nausea, vomiting, or diarrhea   and WBC 17.95K on admission, leukocytosis is multifactorial given recently received Neupogen and outpatient labs reveal WBC 16.4K (on 11/24/23)  CT A/P with wall-thickened small bowel and ascending/transverse colon suggests enterocolitis  Will hold off on antibiotics at this time given asymptomatic  F/u blood cultures, urine culture  Monitor fever curve and WBC trend    Elevated alkaline phosphatase and transaminitis, suspect secondary to hepatic metastases  Alkphos 599, , ALT 51 on admission.  CT A/P with nodular liver contour. Numerous hepatic masses, consistent with metastases given history of   Stage IV Duodenal cancer. Largest mass is within the left hepatic lobe measures 7.9 x 4.4 cm. A right hepatic lobe mass measures 4.4 x 2.8 cm.       Plan: IR paracentesis on Monday. Limited abdominal sonogram ordered. IR consult placed.     Outside Oncologist is Dr. Stanley Mckee at [phone].   Will call in AM to define primary malignancy.    Veronica Napoles is a 63 year old female with PMHx of HTN, anxiety, COPD, esophagitis, and stage IV duodenal CA (s/p gastrojejunostomy in 3/2023, on chemotherapy since 4/2023) who presented to the ED on 11/30/23 for complaints of lethargy and hypotension and admitted for abdominal ascites and elevated creatinine on CKD.      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 permanent [place] given quick reaccumulation.    Paracentesis planned for Monday     Elevated creatinine on CKD?, likely etiology prerenal secondary to decreased PO intake  Hyponatremia and NAGMA likely secondary to above  BUN 76 and Cr 1.85 on admission, outpatient labs reveal Cr 1.61 (on 11/24/23), sodium 129 on admission  Monitor renal function    Sepsis, possibly secondary to enterocolitis  Differential includes viral vs. bacterial  Admits to abdominal cramping (not new), denies nausea, vomiting, or diarrhea   and WBC 17.95K on admission, leukocytosis is multifactorial given recently received Neupogen and outpatient labs reveal WBC 16.4K (on 11/24/23)  CT A/P with wall-thickened small bowel and ascending/transverse colon suggests enterocolitis  Will hold off on antibiotics at this time given asymptomatic  F/u blood cultures, urine culture  Monitor fever curve and WBC trend    Elevated alkaline phosphatase and transaminitis, suspect secondary to hepatic metastases  Alkphos 599, , ALT 51 on admission.  CT A/P with nodular liver contour. Numerous hepatic masses, consistent with metastases given history of   Stage IV Duodenal cancer. Largest mass is within the left hepatic lobe measures 7.9 x 4.4 cm. A right hepatic lobe mass measures 4.4 x 2.8 cm.       Plan: IR paracentesis on Monday. Limited abdominal sonogram ordered. IR consult placed.     Outside Oncologist is Dr. Stanley Mckee at [phone].   Will call in AM to define primary malignancy.    Veronica Napoles is a 63 year old female with PMHx of HTN, anxiety, COPD, esophagitis, and stage IV duodenal CA (s/p gastrojejunostomy in 3/2023, on chemotherapy since 4/2023) who presented to the ED on 11/30/23 for complaints of lethargy and hypotension and admitted for abdominal ascites and elevated creatinine on CKD.      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 permanent [place] given quick reaccumulation.    Paracentesis planned for Monday     Elevated creatinine on CKD?, likely etiology prerenal secondary to decreased PO intake  Hyponatremia and NAGMA likely secondary to above  BUN 76 and Cr 1.85 on admission, outpatient labs reveal Cr 1.61 (on 11/24/23), sodium 129 on admission  Monitor renal function    Sepsis, possibly secondary to enterocolitis  Differential includes viral vs. bacterial  Admits to abdominal cramping (not new), denies nausea, vomiting, or diarrhea   and WBC 17.95K on admission, leukocytosis is multifactorial given recently received Neupogen and outpatient labs reveal WBC 16.4K (on 11/24/23)  CT A/P with wall-thickened small bowel and ascending/transverse colon suggests enterocolitis  Will hold off on antibiotics at this time given asymptomatic  F/u blood cultures, urine culture  Monitor fever curve and WBC trend    Elevated alkaline phosphatase and transaminitis, suspect secondary to hepatic metastases  Alkphos 599, , ALT 51 on admission.  CT A/P with nodular liver contour. Numerous hepatic masses, consistent with metastases given history of   Stage IV Duodenal cancer. Largest mass is within the left hepatic lobe measures 7.9 x 4.4 cm. A right hepatic lobe mass measures 4.4 x 2.8 cm.       Plan: IR paracentesis on Monday. Limited abdominal sonogram ordered. IR consult placed.     Outside Oncologist is Dr. Stanley Mckee at [phone].   Will call in AM to define primary malignancy.

## 2023-12-03 NOTE — PROGRESS NOTE ADULT - SUBJECTIVE AND OBJECTIVE BOX
INTERVAL HPI/OVERNIGHT EVENTS:  Pt seen and examined at bedside.     Allergies/Intolerance: No Known Allergies      MEDICATIONS  (STANDING):  chlorhexidine 2% Cloths 1 Application(s) Topical daily  montelukast 10 milliGRAM(s) Oral daily  pantoprazole    Tablet 40 milliGRAM(s) Oral before breakfast  polyethylene glycol 3350 17 Gram(s) Oral two times a day  QUEtiapine 50 milliGRAM(s) Oral at bedtime  senna 2 Tablet(s) Oral at bedtime  sertraline 50 milliGRAM(s) Oral daily  sucralfate 1 Gram(s) Oral <User Schedule>    MEDICATIONS  (PRN):  acetaminophen     Tablet .. 650 milliGRAM(s) Oral every 6 hours PRN Temp greater or equal to 38C (100.4F), Mild Pain (1 - 3)  albuterol    90 MICROgram(s) HFA Inhaler 2 Puff(s) Inhalation every 6 hours PRN Shortness of Breath and/or Wheezing  ALPRAZolam 0.5 milliGRAM(s) Oral three times a day PRN anxiety  HYDROmorphone   Tablet 4 milliGRAM(s) Oral every 6 hours PRN Moderate Pain (4 - 6)  melatonin 3 milliGRAM(s) Oral at bedtime PRN Insomnia  ondansetron Injectable 4 milliGRAM(s) IV Push every 6 hours PRN Nausea and/or Vomiting        ROS: all systems reviewed and wnl      PHYSICAL EXAMINATION:  Vital Signs Last 24 Hrs  T(C): 36.4 (03 Dec 2023 05:09), Max: 36.7 (02 Dec 2023 23:14)  T(F): 97.6 (03 Dec 2023 05:09), Max: 98.1 (02 Dec 2023 23:14)  HR: 81 (03 Dec 2023 05:09) (81 - 99)  BP: 113/81 (03 Dec 2023 05:09) (113/81 - 126/92)  BP(mean): --  RR: 18 (03 Dec 2023 05:09) (16 - 18)  SpO2: 98% (03 Dec 2023 05:09) (97% - 100%)    Parameters below as of 03 Dec 2023 05:09  Patient On (Oxygen Delivery Method): room air      CAPILLARY BLOOD GLUCOSE          12-02 @ 07:01  -  12-03 @ 07:00  --------------------------------------------------------  IN: 820 mL / OUT: 400 mL / NET: 420 mL        GENERAL: in bed, stable ascites, no SOB or fevers.    NECK: supple, No JVD  CHEST/LUNG: clear to auscultation bilaterally; no rales, rhonchi, or wheezing b/l  HEART: normal S1, S2  ABDOMEN: BS+, soft, ND, NT   EXTREMITIES:  pulses palpable; no clubbing, cyanosis, or edema b/l LEs    LABS:                        12.4   26.40 )-----------( 107      ( 03 Dec 2023 06:20 )             36.1     12-03    131<L>  |  99  |  70<H>  ----------------------------<  119<H>  4.4   |  19<L>  |  1.73<H>    Ca    8.7      03 Dec 2023 06:20    TPro  x   /  Alb  x   /  TBili  1.3<H>  /  DBili  x   /  AST  x   /  ALT  x   /  AlkPhos  x   12-02    PT/INR - ( 02 Dec 2023 07:00 )   PT: 13.5 sec;   INR: 1.14 ratio           Urinalysis Basic - ( 03 Dec 2023 06:20 )    Color: x / Appearance: x / SG: x / pH: x  Gluc: 119 mg/dL / Ketone: x  / Bili: x / Urobili: x   Blood: x / Protein: x / Nitrite: x   Leuk Esterase: x / RBC: x / WBC x   Sq Epi: x / Non Sq Epi: x / Bacteria: x

## 2023-12-04 NOTE — CONSULT NOTE ADULT - SUBJECTIVE AND OBJECTIVE BOX
Interventional Radiology    Evaluate for Procedure: paracentesis    HPI: 63y Female with PMHx of HTN, anxiety, COPD, esophagitis, and stage IV duodenal CA (s/p gastrojejunostomy in 3/2023, on chemotherapy since 4/2023) who presented to the ED on 11/30/23 for complaints of lethargy and hypotension and admitted for abdominal ascites and elevated creatinine on CKD. IR consulted for paracentesis.     Allergies: No Known Allergies    Medications (Abx/Cardiac/Anticoagulation/Blood Products)      Data:    T(C): 36.2  HR: 98  BP: 107/77  RR: 18  SpO2: 96%    -WBC 26.40 / HgB 12.4 / Hct 36.1 / Plt 107  -Na 131 / Cl 99 / BUN 70 / Glucose 119  -K 4.4 / CO2 19 / Cr 1.73  -ALT -- / Alk Phos -- / T.Bili --  -INR 1.14 / PTT --    Radiology: Reviewed    Assessment/Plan:   -63y Female with PMHx of HTN, anxiety, COPD, esophagitis, and stage IV duodenal CA (s/p gastrojejunostomy in 3/2023, on chemotherapy since 4/2023) who presented to the ED on 11/30/23 for complaints of lethargy and hypotension and admitted for abdominal ascites and elevated creatinine on CKD. IR consulted for paracentesis.       - case reviewed and approved for dx/tx paracentesis  - IR procedure order placed  - STAT labs in AM (cbc,coags, bmp, T&S)  - not on AC  - does not need to be NPO  - d/w primary team

## 2023-12-04 NOTE — PROGRESS NOTE ADULT - SUBJECTIVE AND OBJECTIVE BOX
INTERVAL HPI/OVERNIGHT EVENTS:  Pt seen and examined at bedside.     Allergies/Intolerance: No Known Allergies      MEDICATIONS  (STANDING):  albumin human 25% IVPB 50 milliLiter(s) IV Intermittent every 30 minutes  chlorhexidine 2% Cloths 1 Application(s) Topical daily  montelukast 10 milliGRAM(s) Oral daily  pantoprazole    Tablet 40 milliGRAM(s) Oral before breakfast  polyethylene glycol 3350 17 Gram(s) Oral two times a day  QUEtiapine 50 milliGRAM(s) Oral at bedtime  senna 2 Tablet(s) Oral at bedtime  sertraline 50 milliGRAM(s) Oral daily  sucralfate 1 Gram(s) Oral <User Schedule>    MEDICATIONS  (PRN):  acetaminophen     Tablet .. 650 milliGRAM(s) Oral every 6 hours PRN Temp greater or equal to 38C (100.4F), Mild Pain (1 - 3)  albuterol    90 MICROgram(s) HFA Inhaler 2 Puff(s) Inhalation every 6 hours PRN Shortness of Breath and/or Wheezing  ALPRAZolam 0.5 milliGRAM(s) Oral three times a day PRN anxiety  HYDROmorphone   Tablet 4 milliGRAM(s) Oral every 6 hours PRN Moderate Pain (4 - 6)  melatonin 3 milliGRAM(s) Oral at bedtime PRN Insomnia        ROS: all systems reviewed and wnl      PHYSICAL EXAMINATION:  Vital Signs Last 24 Hrs  T(C): 36.3 (04 Dec 2023 10:45), Max: 36.9 (03 Dec 2023 23:27)  T(F): 97.4 (04 Dec 2023 10:45), Max: 98.5 (03 Dec 2023 23:27)  HR: 101 (04 Dec 2023 10:45) (77 - 101)  BP: 117/84 (04 Dec 2023 10:45) (103/74 - 117/84)  BP(mean): --  RR: 16 (04 Dec 2023 10:45) (16 - 18)  SpO2: 100% (04 Dec 2023 10:45) (96% - 100%)    Parameters below as of 04 Dec 2023 09:50  Patient On (Oxygen Delivery Method): room air      CAPILLARY BLOOD GLUCOSE          12-03 @ 07:01  -  12-04 @ 07:00  --------------------------------------------------------  IN: 480 mL / OUT: 0 mL / NET: 480 mL    12-04 @ 07:01  -  12-04 @ 10:48  --------------------------------------------------------  IN: 0 mL / OUT: 4700 mL / NET: -4700 mL        GENERAL: in bed, comfortable, stable ascites, no pain.    NECK: supple, No JVD  CHEST/LUNG: clear to auscultation bilaterally; no rales, rhonchi, or wheezing b/l  HEART: normal S1, S2  ABDOMEN: BS+, soft, ND, NT   EXTREMITIES:  pulses palpable; no clubbing, cyanosis, or edema b/l LEs  SKIN: no rashes or lesions      LABS:                        12.4   26.40 )-----------( 107      ( 03 Dec 2023 06:20 )             36.1     12-03    131<L>  |  99  |  70<H>  ----------------------------<  119<H>  4.4   |  19<L>  |  1.73<H>    Ca    8.7      03 Dec 2023 06:20        Urinalysis Basic - ( 03 Dec 2023 06:20 )    Color: x / Appearance: x / SG: x / pH: x  Gluc: 119 mg/dL / Ketone: x  / Bili: x / Urobili: x   Blood: x / Protein: x / Nitrite: x   Leuk Esterase: x / RBC: x / WBC x   Sq Epi: x / Non Sq Epi: x / Bacteria: x

## 2023-12-04 NOTE — PROGRESS NOTE ADULT - ASSESSMENT
Veronica Napoles is a 63 year old female with PMHx of HTN, anxiety, COPD, esophagitis, and stage IV duodenal CA (s/p gastrojejunostomy in 3/2023, on chemotherapy since 4/2023) who presented to the ED on 11/30/23 for complaints of lethargy and hypotension and admitted for abdominal ascites and elevated creatinine on CKD.      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 permanent paradrain given quick reaccumulation.    Paracentesis planned for Monday     Elevated creatinine on CKD?, likely etiology prerenal secondary to decreased PO intake  Hyponatremia and NAGMA likely secondary to above  BUN 76 and Cr 1.85 on admission, outpatient labs reveal Cr 1.61 (on 11/24/23), sodium 129 on admission  Monitor renal function    Sepsis, possibly secondary to enterocolitis  Differential includes viral vs. bacterial  Admits to abdominal cramping (not new), denies nausea, vomiting, or diarrhea   and WBC 17.95K on admission, leukocytosis is multifactorial given recently received Neupogen and outpatient labs reveal WBC 16.4K (on 11/24/23)  CT A/P with wall-thickened small bowel and ascending/transverse colon suggests enterocolitis  Will hold off on antibiotics at this time given asymptomatic  F/u blood cultures, urine culture  Monitor fever curve and WBC trend    Elevated alkaline phosphatase and transaminitis, suspect secondary to hepatic metastases  Alkphos 599, , ALT 51 on admission.  CT A/P with nodular liver contour. Numerous hepatic masses, consistent with metastases given history of   Stage IV Duodenal cancer. Largest mass is within the left hepatic lobe measures 7.9 x 4.4 cm. A right hepatic lobe mass measures 4.4 x 2.8 cm.       Plan: IR paracentesis on Monday. Limited abdominal sonogram ordered. IR consult placed.     Outside Oncologist is Dr. Stanley Mckee at [phone].   Will call in AM to define primary malignancy.    Veronica Napoles is a 63 year old female with PMHx of HTN, anxiety, COPD, esophagitis, and stage IV duodenal CA (s/p gastrojejunostomy in 3/2023, on chemotherapy since 4/2023) who presented to the ED on 11/30/23 for complaints of lethargy and hypotension and admitted for abdominal ascites and elevated creatinine on CKD.      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 permanent paradrain given quick reaccumulation.    Paracentesis planned for Monday     Elevated creatinine on CKD?, likely etiology prerenal secondary to decreased PO intake  Hyponatremia and NAGMA likely secondary to above  BUN 76 and Cr 1.85 on admission, outpatient labs reveal Cr 1.61 (on 11/24/23), sodium 129 on admission  Monitor renal function    Sepsis, possibly secondary to enterocolitis  Differential includes viral vs. bacterial  Admits to abdominal cramping (not new), denies nausea, vomiting, or diarrhea   and WBC 17.95K on admission, leukocytosis is multifactorial given recently received Neupogen and outpatient labs reveal WBC 16.4K (on 11/24/23)  CT A/P with wall-thickened small bowel and ascending/transverse colon suggests enterocolitis  Will hold off on antibiotics at this time given asymptomatic  F/u blood cultures, urine culture  Monitor fever curve and WBC trend    Elevated alkaline phosphatase and transaminitis, suspect secondary to hepatic metastases  Alkphos 599, , ALT 51 on admission.  CT A/P with nodular liver contour. Numerous hepatic masses, consistent with metastases given history of   Stage IV Duodenal cancer. Largest mass is within the left hepatic lobe measures 7.9 x 4.4 cm. A right hepatic lobe mass measures 4.4 x 2.8 cm.       Plan: IR paracentesis on Monday. Limited abdominal sonogram ordered. IR consult placed.     Outside Oncologist is Dr. Stanley Mckee at [phone].   Will call in AM to define primary malignancy.    Veronica Napoles is a 63 year old female with PMHx of HTN, anxiety, COPD, esophagitis, and stage IV duodenal CA (s/p gastrojejunostomy in 3/2023, on chemotherapy since 4/2023) who presented to the ED on 11/30/23 for complaints of lethargy and hypotension and admitted for abdominal ascites and elevated creatinine on CKD.      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      Elevated creatinine on CKD?, likely etiology prerenal secondary to decreased PO intake  Hyponatremia and NAGMA likely secondary to above  BUN 76 and Cr 1.85 on admission, outpatient labs reveal Cr 1.61 (on 11/24/23), sodium 129 on admission  Monitor renal function    Sepsis, possibly secondary to enterocolitis  Differential includes viral vs. bacterial  Admits to abdominal cramping (not new), denies nausea, vomiting, or diarrhea   and WBC 17.95K on admission, leukocytosis is multifactorial given recently received Neupogen and outpatient labs reveal WBC 16.4K (on 11/24/23)  CT A/P with wall-thickened small bowel and ascending/transverse colon suggests enterocolitis  Will hold off on antibiotics at this time given asymptomatic  F/u blood cultures, urine culture,     Monitor fever curve and WBC trend off ABX for now, no abdominal pain, BM normal.     Elevated alkaline phosphatase and transaminitis, suspect secondary to hepatic metastases  Alkphos 599, , ALT 51 on admission.  CT A/P with nodular liver contour. Numerous hepatic masses, consistent with metastases given history of   Stage IV Duodenal cancer. Largest mass is within the left hepatic lobe measures 7.9 x 4.4 cm. A right hepatic lobe mass measures 4.4 x 2.8 cm.       Outside Oncologist is Dr. Stanley Mckee at [phone].   Will call in AM to define primary malignancy.    Veronica Napoles is a 63 year old female with PMHx of HTN, anxiety, COPD, esophagitis, and stage IV duodenal CA (s/p gastrojejunostomy in 3/2023, on chemotherapy since 4/2023) who presented to the ED on 11/30/23 for complaints of lethargy and hypotension and admitted for abdominal ascites and elevated creatinine on CKD.      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      Elevated creatinine on CKD?, likely etiology prerenal secondary to decreased PO intake  Hyponatremia and NAGMA likely secondary to above  BUN 76 and Cr 1.85 on admission, outpatient labs reveal Cr 1.61 (on 11/24/23), sodium 129 on admission  Monitor renal function    Sepsis, possibly secondary to enterocolitis  Differential includes viral vs. bacterial  Admits to abdominal cramping (not new), denies nausea, vomiting, or diarrhea   and WBC 17.95K on admission, leukocytosis is multifactorial given recently received Neupogen and outpatient labs reveal WBC 16.4K (on 11/24/23)  CT A/P with wall-thickened small bowel and ascending/transverse colon suggests enterocolitis  Will hold off on antibiotics at this time given asymptomatic  F/u blood cultures, urine culture,     Monitor fever curve and WBC trend off ABX for now, no abdominal pain, BM normal.     Elevated alkaline phosphatase and transaminitis, suspect secondary to hepatic metastases  Alkphos 599, , ALT 51 on admission.  CT A/P with nodular liver contour. Numerous hepatic masses, consistent with metastases given history of   Stage IV Duodenal cancer. Largest mass is within the left hepatic lobe measures 7.9 x 4.4 cm. A right hepatic lobe mass measures 4.4 x 2.8 cm.       Outside Oncologist is Dr. Stanley Mckee at [phone].   Will call in AM to define primary malignancy.    Veronica Napoles is a 63 year old female with PMHx of HTN, anxiety, COPD, esophagitis, and stage IV duodenal CA (s/p gastrojejunostomy in 3/2023, on chemotherapy since 4/2023) who presented to the ED on 11/30/23 for complaints of lethargy and hypotension and admitted for abdominal ascites and elevated creatinine on CKD.      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      Elevated creatinine on CKD?, likely etiology prerenal secondary to decreased PO intake  Hyponatremia and NAGMA likely secondary to above  BUN 76 and Cr 1.85 on admission, outpatient labs reveal Cr 1.61 (on 11/24/23), sodium 129 on admission  Monitor renal function    Elevated alkaline phosphatase and transaminitis, suspect secondary to hepatic metastases  Alkphos 599, , ALT 51 on admission.  CT A/P with nodular liver contour. Numerous hepatic masses, consistent with metastases given history of   Stage IV Duodenal cancer. Largest mass is within the left hepatic lobe measures 7.9 x 4.4 cm. A right hepatic lobe mass measures 4.4 x 2.8 cm.       Outside Oncologist is Dr. Stanley Mckee at [phone] was called twice, no answer.   Will call in AM to define primary malignancy and next Oncology plan if any.     Poor PO intake overall, calorie count in place, if fails, family considering NGT.     Veronica Napoles is a 63 year old female with PMHx of HTN, anxiety, COPD, esophagitis, and stage IV duodenal CA (s/p gastrojejunostomy in 3/2023, on chemotherapy since 4/2023) who presented to the ED on 11/30/23 for complaints of lethargy and hypotension and admitted for abdominal ascites and elevated creatinine on CKD.      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      Elevated creatinine on CKD?, likely etiology prerenal secondary to decreased PO intake  Hyponatremia and NAGMA likely secondary to above  BUN 76 and Cr 1.85 on admission, outpatient labs reveal Cr 1.61 (on 11/24/23), sodium 129 on admission  Monitor renal function    Elevated alkaline phosphatase and transaminitis, suspect secondary to hepatic metastases  Alkphos 599, , ALT 51 on admission.  CT A/P with nodular liver contour. Numerous hepatic masses, consistent with metastases given history of   Stage IV Duodenal cancer. Largest mass is within the left hepatic lobe measures 7.9 x 4.4 cm. A right hepatic lobe mass measures 4.4 x 2.8 cm.       Outside Oncologist is Dr. Stanley Mckee at [phone] was called twice, no answer.   Will call in AM to define primary malignancy and next Oncology plan if any.     Poor PO intake overall, calorie count in place, if fails, family considering NGT.     Veronica Napoles is a 63 year old female with PMHx of HTN, anxiety, COPD, esophagitis, and stage IV duodenal CA (s/p gastrojejunostomy in 3/2023, on chemotherapy since 4/2023) who presented to the ED on 11/30/23 for complaints of lethargy and hypotension and admitted for abdominal ascites and elevated creatinine on CKD.      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      Elevated creatinine on CKD?, likely etiology prerenal secondary to decreased PO intake  Hyponatremia and NAGMA likely secondary to above  BUN 76 and Cr 1.85 on admission, outpatient labs reveal Cr 1.61 (on 11/24/23), sodium 129 on admission  Monitor renal function    Elevated alkaline phosphatase and transaminitis, suspect secondary to hepatic metastases  Alkphos 599, , ALT 51 on admission.  CT A/P with nodular liver contour. Numerous hepatic masses, consistent with metastases given history of   Stage IV Duodenal cancer. Largest mass is within the left hepatic lobe measures 7.9 x 4.4 cm. A right hepatic lobe mass measures 4.4 x 2.8 cm.       Outside Oncologist is Dr. Stanley Mckee at [phone] was called twice, no answer.  Will need to discuss with primary Oncologist regarding prognosis.     Will call in AM to define primary malignancy and next Oncology plan if any vs Hospice. 2 Day calorie count in place, question NGT if prognosis is poor.      Family considering temporary NGT. Will ask Palliative Care consult.      Veronica Napoles is a 63 year old female with PMHx of HTN, anxiety, COPD, esophagitis, and stage IV duodenal CA (s/p gastrojejunostomy in 3/2023, on chemotherapy since 4/2023) who presented to the ED on 11/30/23 for complaints of lethargy and hypotension and admitted for abdominal ascites and elevated creatinine on CKD.      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      Elevated creatinine on CKD?, likely etiology prerenal secondary to decreased PO intake  Hyponatremia and NAGMA likely secondary to above  BUN 76 and Cr 1.85 on admission, outpatient labs reveal Cr 1.61 (on 11/24/23), sodium 129 on admission  Monitor renal function    Elevated alkaline phosphatase and transaminitis, suspect secondary to hepatic metastases  Alkphos 599, , ALT 51 on admission.  CT A/P with nodular liver contour. Numerous hepatic masses, consistent with metastases given history of   Stage IV Duodenal cancer. Largest mass is within the left hepatic lobe measures 7.9 x 4.4 cm. A right hepatic lobe mass measures 4.4 x 2.8 cm.       Outside Oncologist is Dr. Stanley Mckee at [phone] was called twice, no answer.  Will need to discuss with primary Oncologist regarding prognosis.     Will call in AM to define primary malignancy and next Oncology plan if any vs Hospice. 2 Day calorie count in place, question NGT if prognosis is poor.      Family considering temporary NGT. Will ask Palliative Care consult.      Veronica Napoles is a 63 year old female with PMHx of HTN, anxiety, COPD, esophagitis, and stage IV duodenal CA (s/p gastrojejunostomy in 3/2023, on chemotherapy since 4/2023) who presented to the ED on 11/30/23 for complaints of lethargy and hypotension and admitted for abdominal ascites and elevated creatinine on CKD.      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      Elevated creatinine on CKD?, likely etiology prerenal secondary to decreased PO intake  Hyponatremia and NAGMA likely secondary to above  BUN 76 and Cr 1.85 on admission, outpatient labs reveal Cr 1.61 (on 11/24/23), sodium 129 on admission  Monitor renal function    Elevated alkaline phosphatase and transaminitis, suspect secondary to hepatic metastases  Alkphos 599, , ALT 51 on admission.  CT A/P with nodular liver contour. Numerous hepatic masses, consistent with metastases given history of   Stage IV Duodenal cancer. Largest mass is within the left hepatic lobe measures 7.9 x 4.4 cm. A right hepatic lobe mass measures 4.4 x 2.8 cm.       Plan:  Outside Oncologist is Dr. Stanley Mckee at [phone] was called twice, no answer.  Will need to discuss with primary Oncologist regarding prognosis.  Will call again in AM to define primary malignancy and next Oncology   plan if any vs hospice. 2 Day calorie count in place, question NGT if prognosis is poor.      Family considering temporary NGT. Will ask Palliative Care consult.   Had 4.7 liters of ascitic fluid removed yesterday.    Veronica Napoles is a 63 year old female with PMHx of HTN, anxiety, COPD, esophagitis, and stage IV duodenal CA (s/p gastrojejunostomy in 3/2023, on chemotherapy since 4/2023) who presented to the ED on 11/30/23 for complaints of lethargy and hypotension and admitted for abdominal ascites and elevated creatinine on CKD.      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      Elevated creatinine on CKD?, likely etiology prerenal secondary to decreased PO intake  Hyponatremia and NAGMA likely secondary to above  BUN 76 and Cr 1.85 on admission, outpatient labs reveal Cr 1.61 (on 11/24/23), sodium 129 on admission  Monitor renal function    Elevated alkaline phosphatase and transaminitis, suspect secondary to hepatic metastases  Alkphos 599, , ALT 51 on admission.  CT A/P with nodular liver contour. Numerous hepatic masses, consistent with metastases given history of   Stage IV Duodenal cancer. Largest mass is within the left hepatic lobe measures 7.9 x 4.4 cm. A right hepatic lobe mass measures 4.4 x 2.8 cm.       Plan:  Outside Oncologist is Dr. Stanley Mckee at [phone] was called twice, no answer.  Will need to discuss with primary Oncologist regarding prognosis.  Will call again in AM to define primary malignancy and next Oncology   plan if any vs hospice. 2 Day calorie count in place, question NGT if prognosis is poor.      Family considering temporary NGT. Will ask Palliative Care consult.   Had 4.7 liters of ascitic fluid removed yesterday.    Veronica Napoles is a 63 year old female with PMHx of HTN, anxiety, COPD, esophagitis, and stage IV duodenal CA (s/p gastrojejunostomy in 3/2023, on chemotherapy since 4/2023) who presented to the ED on 11/30/23 for complaints of lethargy and hypotension and admitted for abdominal ascites and elevated creatinine on CKD.      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      Elevated creatinine on CKD?, likely etiology prerenal secondary to decreased PO intake  Hyponatremia and NAGMA likely secondary to above  BUN 76 and Cr 1.85 on admission, outpatient labs reveal Cr 1.61 (on 11/24/23), sodium 129 on admission  Monitor renal function    Elevated alkaline phosphatase and transaminitis, suspect secondary to hepatic metastases  Alkphos 599, , ALT 51 on admission.  CT A/P with nodular liver contour. Numerous hepatic masses, consistent with metastases given history of   Stage IV Duodenal cancer. Largest mass is within the left hepatic lobe measures 7.9 x 4.4 cm. A right hepatic lobe mass measures 4.4 x 2.8 cm.       Plan:  Outside Oncologist is Dr. Stanley Mckee at [phone] was called twice, no answer.  Will need to discuss with primary Oncologist regarding prognosis.  Will call again in AM to define primary malignancy and next Oncology   plan if any vs hospice. 2 Day calorie count in place, question NGT if prognosis is poor.      Note: WBC count is elevated from recent Neupogen.      Family considering temporary NGT. Will ask Palliative Care consult.   Had 4.7 liters of ascitic fluid removed yesterday.  Spoke to Oncology this AM, will see as consult.     Veronica Napoles is a 63 year old female with PMHx of HTN, anxiety, COPD, esophagitis, and stage IV duodenal CA (s/p gastrojejunostomy in 3/2023, on chemotherapy since 4/2023) who presented to the ED on 11/30/23 for complaints of lethargy and hypotension and admitted for abdominal ascites and elevated creatinine on CKD.      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      Elevated creatinine on CKD?, likely etiology prerenal secondary to decreased PO intake  Hyponatremia and NAGMA likely secondary to above  BUN 76 and Cr 1.85 on admission, outpatient labs reveal Cr 1.61 (on 11/24/23), sodium 129 on admission  Monitor renal function    Elevated alkaline phosphatase and transaminitis, suspect secondary to hepatic metastases  Alkphos 599, , ALT 51 on admission.  CT A/P with nodular liver contour. Numerous hepatic masses, consistent with metastases given history of   Stage IV Duodenal cancer. Largest mass is within the left hepatic lobe measures 7.9 x 4.4 cm. A right hepatic lobe mass measures 4.4 x 2.8 cm.       Plan:  Outside Oncologist is Dr. Stanley Mckee at [phone] was called twice, no answer.  Will need to discuss with primary Oncologist regarding prognosis.  Will call again in AM to define primary malignancy and next Oncology   plan if any vs hospice. 2 Day calorie count in place, question NGT if prognosis is poor.      Note: WBC count is elevated from recent Neupogen.      Family considering temporary NGT. Will ask Palliative Care consult.   Had 4.7 liters of ascitic fluid removed yesterday.  Spoke to Oncology this AM, will see as consult.

## 2023-12-05 NOTE — PROGRESS NOTE ADULT - ASSESSMENT
BONY SHEPARD is a 63y Female presenting with presenting with Abdominal ascites, elevated creatinine on CKD?, sepsis, possibly secondary to enterocolitis, hx of mets duodenal ca    # Recurrent abd ascites   - s/p paracentesis yesterday  - monitor fluid volume     # Malnutrition   - nutrition consulted    # Stage IV duodenal CA  - oncology on board   - recommending palliative care   - primary oncologist Dr. Stanley Mckee cell  (spoke with Dr. Escobedo and will reach out to family)    transfer center states Dr. Mckee rounds at [facility] and [facility], on hold at this time  pending palliative care for goals of care vs hospice  BONY SHEPARD is a 63y Female presenting with presenting with Abdominal ascites, elevated creatinine on CKD?, sepsis, possibly secondary to enterocolitis, hx of mets duodenal ca    # Recurrent abd ascites   - s/p paracentesis yesterday  - monitor fluid volume     # Malnutrition   - nutrition consulted    # Stage IV duodenal CA  - oncology on board   - recommending palliative care   - primary oncologist Dr. Stanley Mckee cell  (spoke with Dr. Escobedo and will reach out to family)    transfer center states Dr. Mckee rounds at [facility] and [facility], on hold at this time  pending palliative care for goals of care vs hospice

## 2023-12-05 NOTE — CHART NOTE - NSCHARTNOTEFT_GEN_A_CORE
2-Day Calorie Count collected. (12/03-12/04)    Diet, Minced and Moist + [place] Standard 1.0 nutritional supplement x 2/day (12-03-23 @ 13:17) [Active]    Pt consuming 0% of foods on 12/03 and 12/04 except for some dessert on both days. Per calorie count pt did consume 100% of [place] Standard 1.0 nutritional supplement x 2/day which provides 650 calories, 32 grams protein on both days. Confirmed with diet office to make sure pt continues to receive nutritional supplement.    Per initial assessment pt with severe malnutrition. s/p paracentesis 12/04 with 4.7L removed.     Continue with minced and moist + [place] Standard 1.0 nutritional supplement x 2/day. Preferences were taken and relayed to diet office.   Continue to provide assistance and encouragement with PO intake.  Consider palliative consult.    RD remains available. 2-Day Calorie Count collected. (12/03-12/04)    Diet, Minced and Moist + Kites Standard 1.0 nutritional supplement x 2/day (12-03-23 @ 13:17) [Active]    Pt consuming 0% of foods on 12/03 and 12/04 except for some dessert on both days. Per calorie count pt did consume 100% of Kites Standard 1.0 nutritional supplement x 2/day which provides 650 calories, 32 grams protein on both days. Confirmed with diet office to make sure pt continues to receive nutritional supplement.    Per initial assessment pt with severe malnutrition. s/p paracentesis 12/04 with 4.7L removed.     Continue with minced and moist + Kites Standard 1.0 nutritional supplement x 2/day. Preferences were taken and relayed to diet office.   Continue to provide assistance and encouragement with PO intake.  Consider palliative consult.    RD remains available.

## 2023-12-05 NOTE — CONSULT NOTE ADULT - ASSESSMENT
63 year old female with PMHx of HTN, anxiety, COPD, esophagitis, and stage IV duodenal CA (s/p gastrojejunostomy in 3/2023, on chemotherapy since 4/2023) who presented to the ED on 11/30/23 for complaints of lethargy and hypotension.     #Metastatic duodenal cancer   - Per patient's daughter patient was diagnosed in February 2023 when she presented with abdominal pain, and is followed by Dr. Stanley Mckee, oncologist from [facility]  - Started on FOLFOX chemotherapy since April 2023 with CEA response   - Recently changed chemotherapy regimen to start FOLFIRI plus Mvasi for disease progression   - Last chemotherapy was on 11/1   - Recurrent ascites requiring multiple paracenteses over the past weeks   - s/p therapeutic abdominal ascites with IR, >4L ascites drained with some improvement of symptoms   - please obtain oncology records if possible; patient's daughter to reach out to Dr. Mckee to discuss treatment plans   - No plans for inpatient chemotherapy and patient to f/u with Dr. Mckee as outpatient     #GOC   - Palliative care consultation recommended, for assistance with managing multiple symptoms (pain, nausea, decreased appetite)  - Ongoing goals of care conversations given patient's clinical and performance status  - PT/OT evaluation     # Enterocolitis   - Infectious workup and treatment per primary team     Plans discussed with the patient at bedside. Please call with questions, will continue to follow.    Jf Concepcion MD  Heme/Onc attending   [employer] or [phone]       63 year old female with PMHx of HTN, anxiety, COPD, esophagitis, and stage IV duodenal CA (s/p gastrojejunostomy in 3/2023, on chemotherapy since 4/2023) who presented to the ED on 11/30/23 for complaints of lethargy and hypotension.     #Metastatic duodenal cancer   - Per patient's daughter patient was diagnosed in February 2023 when she presented with abdominal pain, and is followed by Dr. Stanley Mckee, oncologist from [facility]  - Started on FOLFOX chemotherapy since April 2023 with CEA response   - Recently changed chemotherapy regimen to start FOLFIRI plus Mvasi for disease progression   - Last chemotherapy was on 11/1   - Recurrent ascites requiring multiple paracenteses over the past weeks   - s/p therapeutic abdominal ascites with IR, >4L ascites drained with some improvement of symptoms   - please obtain oncology records if possible; patient's daughter to reach out to Dr. Mckee to discuss treatment plans   - No plans for inpatient chemotherapy and patient to f/u with Dr. Mckee as outpatient     #GOC   - Palliative care consultation recommended, for assistance with managing multiple symptoms (pain, nausea, decreased appetite)  - Ongoing goals of care conversations given patient's clinical and performance status  - PT/OT evaluation     # Enterocolitis   - Infectious workup and treatment per primary team     Plans discussed with the patient at bedside. Please call with questions, will continue to follow.    Jf Concepcion MD  Heme/Onc attending   [employer] or [phone]

## 2023-12-05 NOTE — PROGRESS NOTE ADULT - SUBJECTIVE AND OBJECTIVE BOX
Patient is a 63y old  Female who presents with a chief complaint of Abdominal ascites, elevated creatinine on CKD?, sepsis, possibly secondary to enterocolitis (05 Dec 2023 13:20)    INTERVAL HPI/OVERNIGHT EVENTS:    spoke with daughter at bedside.. patient still with loss of appetite    MEDICATIONS  (STANDING):  chlorhexidine 2% Cloths 1 Application(s) Topical daily  heparin   Injectable 5000 Unit(s) SubCutaneous every 12 hours  montelukast 10 milliGRAM(s) Oral daily  pantoprazole    Tablet 40 milliGRAM(s) Oral before breakfast  polyethylene glycol 3350 17 Gram(s) Oral two times a day  QUEtiapine 50 milliGRAM(s) Oral at bedtime  senna 2 Tablet(s) Oral at bedtime  sertraline 50 milliGRAM(s) Oral daily  sucralfate 1 Gram(s) Oral <User Schedule>    MEDICATIONS  (PRN):  acetaminophen     Tablet .. 650 milliGRAM(s) Oral every 6 hours PRN Temp greater or equal to 38C (100.4F), Mild Pain (1 - 3)  albuterol    90 MICROgram(s) HFA Inhaler 2 Puff(s) Inhalation every 6 hours PRN Shortness of Breath and/or Wheezing  ALPRAZolam 0.5 milliGRAM(s) Oral three times a day PRN anxiety  HYDROmorphone   Tablet 4 milliGRAM(s) Oral every 6 hours PRN Moderate Pain (4 - 6)  melatonin 3 milliGRAM(s) Oral at bedtime PRN Insomnia  ondansetron Injectable 4 milliGRAM(s) IV Push every 6 hours PRN Nausea and/or Vomiting    Allergies    No Known Allergies    Intolerances      REVIEW OF SYSTEMS:  All other systems reviewed and are negative    Vital Signs Last 24 Hrs  T(C): 36.2 (05 Dec 2023 16:22), Max: 36.7 (04 Dec 2023 23:29)  T(F): 97.2 (05 Dec 2023 16:22), Max: 98 (04 Dec 2023 23:29)  HR: 95 (05 Dec 2023 16:22) (95 - 106)  BP: 102/75 (05 Dec 2023 16:22) (93/66 - 111/67)  BP(mean): --  RR: 18 (05 Dec 2023 16:22) (18 - 20)  SpO2: 95% (05 Dec 2023 16:22) (95% - 99%)    Parameters below as of 05 Dec 2023 16:22  Patient On (Oxygen Delivery Method): room air      Daily     Daily Weight in k (05 Dec 2023 04:27)  I&O's Summary    04 Dec 2023 07:01  -  05 Dec 2023 07:00  --------------------------------------------------------  IN: 0 mL / OUT: 4700 mL / NET: -4700 mL      CAPILLARY BLOOD GLUCOSE        PHYSICAL EXAM:  GENERAL: NAD, well-groomed, cachetic   HEAD:  Atraumatic, Normocephalic  EYES: EOMI, PERRLA, conjunctiva and sclera clear  ENMT: No tonsillar erythema, exudates, or enlargement; Moist mucous membranes, Good dentition, No lesions  NECK: Supple, No JVD, Normal thyroid  NERVOUS SYSTEM:  Alert & Oriented X3, Good concentration; Motor Strength 5/5 B/L upper and lower extremities; DTRs 2+ intact and symmetric  CHEST/LUNG: Clear to percussion bilaterally; No rales, rhonchi, wheezing, or rubs  HEART: Regular rate and rhythm; No murmurs, rubs, or gallops  ABDOMEN: Soft, Nontender, Nondistended; Bowel sounds present  EXTREMITIES:  2+ Peripheral Pulses, No clubbing, cyanosis, or edema  LYMPH: No lymphadenopathy noted  SKIN: No rashes or lesions    Labs                      Culture - Body Fluid with Gram Stain (collected 04 Dec 2023 09:27)  Source: Peritoneal Peritoneal Fluid  Gram Stain (prelim) (05 Dec 2023 10:26):    polymorphonuclear leukocytes seen    No organisms seen    by cytocentrifuge  Preliminary Report (05 Dec 2023 10:26):    No growth

## 2023-12-05 NOTE — CONSULT NOTE ADULT - SUBJECTIVE AND OBJECTIVE BOX
Oncology Consult Note    HPI:  Veronica Napoles is a 63 year old female with PMHx of HTN, anxiety, COPD, esophagitis, and stage IV duodenal CA (s/p gastrojejunostomy in 3/2023, on chemotherapy since 4/2023) who presented to the ED on 11/30/23 for complaints of lethargy and hypotension.    History primarily obtained from daughter. Daughter reports she changed patient into her pajamas this morning before she left for work around 9:45 AM. Patient was also up to the bathroom three times by herself. When daughter got home around 1 PM, patient was lethargic and described as incoherent. Blood pressure was low when daughter checked. Called EMS to bring to the ER for further evaluation. Of note, patient has been consuming soft pureed foods and ice cream with protein powder. Daughter is worried that patient has been steadily loosing weight and requested nutrition consultation.    Of note, patient started chemotherapy on April 2023 with FOLOFOX and CEA was being trended. Initially downtrended so oncologist stated treatment was working. Recently got bloodwork with  in November 2023 so plan is to switch treatment to irinotecan, leucovorin, 5-FU, and Mvasi immunotherapy on 12/7/23. Last chemotherapy session was on 11/1/23 and supposed to receive every two weeks. Also received Neupogen with chemotherapy infusion. However, given recurrent abdominal ascites, patient has required three therapeutic paracenteses within the past two weeks. This resulted in inability to attend chemotherapy session due to frequent reaccumulation of ascites.     In the ED, afebrile, HR as elevated as 104, BP as low as 86/56. WBC 17.95K, hgb 11.2, plts 105K, sodium 129, bicarb 21, BUN 76, Cr 1.85, albumin 2.0, alkphos 599, , ALT 51. Lactic acid 2.1. CT chest without PNA but with evidence of pulmonary metastases. CT A/P with hepatic metastases, wall-thickened  small bowel and ascending/transverse colon suggests enterocolitis. No bowel obstruction. Large volume ascites. Received 2L NS bolus and morphine 2 mg IV.  (30 Nov 2023 20:51)    The patient underwent drainage large volume ascites (>4L) with IR yesterday (12/4/23) with IR.  Oncology consulted for further recommendations.     Allergies    No Known Allergies    Intolerances        MEDICATIONS  (STANDING):  chlorhexidine 2% Cloths 1 Application(s) Topical daily  heparin   Injectable 5000 Unit(s) SubCutaneous every 12 hours  montelukast 10 milliGRAM(s) Oral daily  pantoprazole    Tablet 40 milliGRAM(s) Oral before breakfast  polyethylene glycol 3350 17 Gram(s) Oral two times a day  QUEtiapine 50 milliGRAM(s) Oral at bedtime  senna 2 Tablet(s) Oral at bedtime  sertraline 50 milliGRAM(s) Oral daily  sucralfate 1 Gram(s) Oral <User Schedule>    MEDICATIONS  (PRN):  acetaminophen     Tablet .. 650 milliGRAM(s) Oral every 6 hours PRN Temp greater or equal to 38C (100.4F), Mild Pain (1 - 3)  albuterol    90 MICROgram(s) HFA Inhaler 2 Puff(s) Inhalation every 6 hours PRN Shortness of Breath and/or Wheezing  ALPRAZolam 0.5 milliGRAM(s) Oral three times a day PRN anxiety  HYDROmorphone   Tablet 4 milliGRAM(s) Oral every 6 hours PRN Moderate Pain (4 - 6)  melatonin 3 milliGRAM(s) Oral at bedtime PRN Insomnia      PAST MEDICAL & SURGICAL HISTORY:  Hypertension      Anxiety      Adenocarcinoma of small intestine, stage 4      COPD (chronic obstructive pulmonary disease)      No significant past surgical history          FAMILY HISTORY:  FH: myocardial infarction  Mother, Father, and Brother        SOCIAL HISTORY: No EtOH, no tobacco    REVIEW OF SYSTEMS:    CONSTITUTIONAL: No weakness, fevers or chills  EYES/ENT: No visual changes;  No vertigo or throat pain   NECK: No pain or stiffness  RESPIRATORY: No cough, wheezing, hemoptysis; No shortness of breath  CARDIOVASCULAR: No chest pain or palpitations  GASTROINTESTINAL: No abdominal or epigastric pain. No nausea, vomiting, or hematemesis; No diarrhea or constipation. No melena or hematochezia.  GENITOURINARY: No dysuria, frequency or hematuria  NEUROLOGICAL: No numbness or weakness  SKIN: No itching, burning, rashes, or lesions   All other review of systems is negative unless indicated above.        T(F): 97.5 (12-05-23 @ 10:24), Max: 98 (12-04-23 @ 23:29)  HR: 101 (12-05-23 @ 10:24)  BP: 107/75 (12-05-23 @ 10:24)  RR: 20 (12-05-23 @ 10:24)  SpO2: 96% (12-05-23 @ 10:24)  Wt(kg): --    GENERAL: NAD, well-developed  HEAD:  Atraumatic, Normocephalic  EYES: EOMI, PERRLA, conjunctiva and sclera clear  NECK: Supple, No JVD  CHEST/LUNG: Clear to auscultation bilaterally; No wheeze  HEART: Regular rate and rhythm; No murmurs, rubs, or gallops  ABDOMEN: Soft, Nontender, Nondistended; Bowel sounds present  EXTREMITIES:  2+ Peripheral Pulses, No clubbing, cyanosis, or edema  NEUROLOGY: non-focal  SKIN: No rashes or lesions      < from: CT Abdomen and Pelvis w/ IV Cont (11.30.23 @ 18:09) >    ACC: [id number] EXAM:  CT ABDOMEN AND PELVIS IC   ORDERED BY: GARO DIAZ     PROCEDURE DATE:  11/30/2023          INTERPRETATION:  CLINICAL INFORMATION: Intestinal cancer with metastatic   disease. Abdominal pain and weakness.    COMPARISON: None.    CONTRAST/COMPLICATIONS:  IV Contrast: Omnipaque 350  96 cc administered   04 cc discarded  Oral Contrast: NONE  Complications: None reported at time of study completion    PROCEDURE:  CT of the Abdomen and Pelvis was performed.  Sagittal and coronal reformats were performed.    FINDINGS:  LOWER CHEST: Nodular opacities at the lung bases. Partially imaged   catheter tip terminating in the right atrium.    LIVER: Nodular liver contour. Numerous hepatic masses, consistent with   metastases given history of intestinal cancer. Largest mass is within the   left hepatic lobe measures 7.9 x 4.4 cm. A right hepatic lobe mass   measures 4.4 x 2.8 cm.  BILE DUCTS: Normal caliber.  GALLBLADDER: Within normal limits.  SPLEEN: Mildly enlarged.  PANCREAS: Diffuse parenchymal atrophy.  ADRENALS: Within normal limits.  KIDNEYS/URETERS: Within normal limits.    BLADDER: Within normal limits.  REPRODUCTIVE ORGANS: Bilobed right adnexal cyst measuring 3.2 cm.    BOWEL: Gastrojejunostomy. No bowel obstruction. Wall thickened loops of   small bowel. Wall thickening of the right hemicolon and transverse colon.  PERITONEUM: Large volume ascites.  VESSELS: Atherosclerotic change.  RETROPERITONEUM/LYMPH NODES: Enlarged retroperitoneal/upper abdominal and   right pelvic lymph nodes.  ABDOMINAL WALL: Within normal limits.  BONES: Within normal limits.    IMPRESSION:  1.  Hepatic metastatic disease.  2.  Wall-thickened small bowel and ascending/transverse colon suggests   enterocolitis. No bowel obstruction.  3.  Large volume ascites.      --- End of Report ---            GAMALIEL SHIPLEY DO; Attending Radiologist  This document has been electronically signed. Nov 30 2023  6:34PM    < end of copied text >  < from: CT Chest No Cont (11.30.23 @ 18:08) >    ACC: [id number] EXAM:  CT CHEST   ORDERED BY: GARO DIAZ     PROCEDURE DATE:  11/30/2023          INTERPRETATION:  Clinical information: Evaluate for pneumonia.    CT scan of the chest was obtained without administration of intravenous   contrast.    Few lymph nodes are present in the pretracheal space.    Heart is normal in size. Right-sided Mediport catheter is noted in place.   Calcification of coronary arteries is noted. No pericardial effusion is   noted.    No endobronchial lesions are noted. Numerous less than 0.7 cm nodules are   noted within both lungs. No pleural effusions are noted.    For interpretation of the abdominal contents, please see report of the CT   scan of the abdomen performed on the same day.    Degenerative changes of the spine are noted.    IMPRESSION: There is no pneumonia.    Findings suggestive of bilateral pulmonary metastasis.    --- End of Report ---            MICHELE CLARK MD; Attending Radiologist  This document has been electronically signed. Nov 30 2023  6:25PM    < end of copied text >  < from: US Abdomen Limited (12.04.23 @ 09:05) >    ACC: [id number] EXAM:  US ABDOMEN LIMITED   ORDERED BY: RICHARD MYERS     PROCEDURE DATE:  12/04/2023          INTERPRETATION:  CLINICAL INFORMATION: Evaluate ascites for paracentesis    COMPARISON: CT abdomen and pelvis 11/30/2023.    TECHNIQUE: Limited ultrasound of the abdomen to evaluate for ascites.    There is a large amount of ascites in all 4 quadrants, similar to recent   CT    IMPRESSION:  Large volume ascites.        --- End of Report ---            BERE GONSALEZ MD; Attending Radiologist  This document has been electronically signed. Dec  4 2023  9:12AM    < end of copied text >                 Oncology Consult Note    HPI:  Veronica Napoles is a 63 year old female with PMHx of HTN, anxiety, COPD, esophagitis, and stage IV duodenal CA (s/p gastrojejunostomy in 3/2023, on chemotherapy since 4/2023) who presented to the ED on 11/30/23 for complaints of lethargy and hypotension.    History primarily obtained from daughter. Daughter reports she changed patient into her pajamas this morning before she left for work around 9:45 AM. Patient was also up to the bathroom three times by herself. When daughter got home around 1 PM, patient was lethargic and described as incoherent. Blood pressure was low when daughter checked. Called EMS to bring to the ER for further evaluation. Of note, patient has been consuming soft pureed foods and ice cream with protein powder. Daughter is worried that patient has been steadily loosing weight and requested nutrition consultation.    Of note, patient started chemotherapy on April 2023 with FOLOFOX and CEA was being trended. Initially downtrended so oncologist stated treatment was working. Recently got bloodwork with  in November 2023 so plan is to switch treatment to irinotecan, leucovorin, 5-FU, and Mvasi immunotherapy on 12/7/23. Last chemotherapy session was on 11/1/23 and supposed to receive every two weeks. Also received Neupogen with chemotherapy infusion. However, given recurrent abdominal ascites, patient has required three therapeutic paracenteses within the past two weeks. This resulted in inability to attend chemotherapy session due to frequent reaccumulation of ascites.     In the ED, afebrile, HR as elevated as 104, BP as low as 86/56. WBC 17.95K, hgb 11.2, plts 105K, sodium 129, bicarb 21, BUN 76, Cr 1.85, albumin 2.0, alkphos 599, , ALT 51. Lactic acid 2.1. CT chest without PNA but with evidence of pulmonary metastases. CT A/P with hepatic metastases, wall-thickened  small bowel and ascending/transverse colon suggests enterocolitis. No bowel obstruction. Large volume ascites. Received 2L NS bolus and morphine 2 mg IV.  (30 Nov 2023 20:51)    The patient underwent drainage large volume ascites (>4L) with IR yesterday (12/4/23) with IR.  Oncology consulted for further recommendations.     Allergies    No Known Allergies    Intolerances        MEDICATIONS  (STANDING):  chlorhexidine 2% Cloths 1 Application(s) Topical daily  heparin   Injectable 5000 Unit(s) SubCutaneous every 12 hours  montelukast 10 milliGRAM(s) Oral daily  pantoprazole    Tablet 40 milliGRAM(s) Oral before breakfast  polyethylene glycol 3350 17 Gram(s) Oral two times a day  QUEtiapine 50 milliGRAM(s) Oral at bedtime  senna 2 Tablet(s) Oral at bedtime  sertraline 50 milliGRAM(s) Oral daily  sucralfate 1 Gram(s) Oral <User Schedule>    MEDICATIONS  (PRN):  acetaminophen     Tablet .. 650 milliGRAM(s) Oral every 6 hours PRN Temp greater or equal to 38C (100.4F), Mild Pain (1 - 3)  albuterol    90 MICROgram(s) HFA Inhaler 2 Puff(s) Inhalation every 6 hours PRN Shortness of Breath and/or Wheezing  ALPRAZolam 0.5 milliGRAM(s) Oral three times a day PRN anxiety  HYDROmorphone   Tablet 4 milliGRAM(s) Oral every 6 hours PRN Moderate Pain (4 - 6)  melatonin 3 milliGRAM(s) Oral at bedtime PRN Insomnia      PAST MEDICAL & SURGICAL HISTORY:  Hypertension      Anxiety      Adenocarcinoma of small intestine, stage 4      COPD (chronic obstructive pulmonary disease)      No significant past surgical history          FAMILY HISTORY:  FH: myocardial infarction  Mother, Father, and Brother        SOCIAL HISTORY: No EtOH, no tobacco    REVIEW OF SYSTEMS:    CONSTITUTIONAL: No weakness, fevers or chills  EYES/ENT: No visual changes;  No vertigo or throat pain   NECK: No pain or stiffness  RESPIRATORY: No cough, wheezing, hemoptysis; No shortness of breath  CARDIOVASCULAR: No chest pain or palpitations  GASTROINTESTINAL: No abdominal or epigastric pain. No nausea, vomiting, or hematemesis; No diarrhea or constipation. No melena or hematochezia.  GENITOURINARY: No dysuria, frequency or hematuria  NEUROLOGICAL: No numbness or weakness  SKIN: No itching, burning, rashes, or lesions   All other review of systems is negative unless indicated above.        T(F): 97.5 (12-05-23 @ 10:24), Max: 98 (12-04-23 @ 23:29)  HR: 101 (12-05-23 @ 10:24)  BP: 107/75 (12-05-23 @ 10:24)  RR: 20 (12-05-23 @ 10:24)  SpO2: 96% (12-05-23 @ 10:24)  Wt(kg): --    GENERAL: NAD, well-developed  HEAD:  Atraumatic, Normocephalic  EYES: EOMI, PERRLA, conjunctiva and sclera clear  NECK: Supple, No JVD  CHEST/LUNG: Clear to auscultation bilaterally; No wheeze  HEART: Regular rate and rhythm; No murmurs, rubs, or gallops  ABDOMEN: Soft, Nontender, Nondistended; Bowel sounds present  EXTREMITIES:  2+ Peripheral Pulses, No clubbing, cyanosis, or edema  NEUROLOGY: non-focal  SKIN: No rashes or lesions      < from: CT Abdomen and Pelvis w/ IV Cont (11.30.23 @ 18:09) >    ACC: [id number] EXAM:  CT ABDOMEN AND PELVIS IC   ORDERED BY: GARO DIAZ     PROCEDURE DATE:  11/30/2023          INTERPRETATION:  CLINICAL INFORMATION: Intestinal cancer with metastatic   disease. Abdominal pain and weakness.    COMPARISON: None.    CONTRAST/COMPLICATIONS:  IV Contrast: Omnipaque 350  96 cc administered   04 cc discarded  Oral Contrast: NONE  Complications: None reported at time of study completion    PROCEDURE:  CT of the Abdomen and Pelvis was performed.  Sagittal and coronal reformats were performed.    FINDINGS:  LOWER CHEST: Nodular opacities at the lung bases. Partially imaged   catheter tip terminating in the right atrium.    LIVER: Nodular liver contour. Numerous hepatic masses, consistent with   metastases given history of intestinal cancer. Largest mass is within the   left hepatic lobe measures 7.9 x 4.4 cm. A right hepatic lobe mass   measures 4.4 x 2.8 cm.  BILE DUCTS: Normal caliber.  GALLBLADDER: Within normal limits.  SPLEEN: Mildly enlarged.  PANCREAS: Diffuse parenchymal atrophy.  ADRENALS: Within normal limits.  KIDNEYS/URETERS: Within normal limits.    BLADDER: Within normal limits.  REPRODUCTIVE ORGANS: Bilobed right adnexal cyst measuring 3.2 cm.    BOWEL: Gastrojejunostomy. No bowel obstruction. Wall thickened loops of   small bowel. Wall thickening of the right hemicolon and transverse colon.  PERITONEUM: Large volume ascites.  VESSELS: Atherosclerotic change.  RETROPERITONEUM/LYMPH NODES: Enlarged retroperitoneal/upper abdominal and   right pelvic lymph nodes.  ABDOMINAL WALL: Within normal limits.  BONES: Within normal limits.    IMPRESSION:  1.  Hepatic metastatic disease.  2.  Wall-thickened small bowel and ascending/transverse colon suggests   enterocolitis. No bowel obstruction.  3.  Large volume ascites.      --- End of Report ---            GAMALIEL SHIPLEY DO; Attending Radiologist  This document has been electronically signed. Nov 30 2023  6:34PM    < end of copied text >  < from: CT Chest No Cont (11.30.23 @ 18:08) >    ACC: [id number] EXAM:  CT CHEST   ORDERED BY: GARO DIAZ     PROCEDURE DATE:  11/30/2023          INTERPRETATION:  Clinical information: Evaluate for pneumonia.    CT scan of the chest was obtained without administration of intravenous   contrast.    Few lymph nodes are present in the pretracheal space.    Heart is normal in size. Right-sided Mediport catheter is noted in place.   Calcification of coronary arteries is noted. No pericardial effusion is   noted.    No endobronchial lesions are noted. Numerous less than 0.7 cm nodules are   noted within both lungs. No pleural effusions are noted.    For interpretation of the abdominal contents, please see report of the CT   scan of the abdomen performed on the same day.    Degenerative changes of the spine are noted.    IMPRESSION: There is no pneumonia.    Findings suggestive of bilateral pulmonary metastasis.    --- End of Report ---            MICHELE CLARK MD; Attending Radiologist  This document has been electronically signed. Nov 30 2023  6:25PM    < end of copied text >  < from: US Abdomen Limited (12.04.23 @ 09:05) >    ACC: [id number] EXAM:  US ABDOMEN LIMITED   ORDERED BY: RICHARD MYERS     PROCEDURE DATE:  12/04/2023          INTERPRETATION:  CLINICAL INFORMATION: Evaluate ascites for paracentesis    COMPARISON: CT abdomen and pelvis 11/30/2023.    TECHNIQUE: Limited ultrasound of the abdomen to evaluate for ascites.    There is a large amount of ascites in all 4 quadrants, similar to recent   CT    IMPRESSION:  Large volume ascites.        --- End of Report ---            BERE GONSALEZ MD; Attending Radiologist  This document has been electronically signed. Dec  4 2023  9:12AM    < end of copied text >                 Oncology Consult Note    HPI:  Veronica Napoles is a 63 year old female with PMHx of HTN, anxiety, COPD, esophagitis, and stage IV duodenal CA (s/p gastrojejunostomy in 3/2023, on chemotherapy since 4/2023) who presented to the ED on 11/30/23 for complaints of lethargy and hypotension.    History primarily obtained from daughter. Daughter reports she changed patient into her pajamas this morning before she left for work around 9:45 AM. Patient was also up to the bathroom three times by herself. When daughter got home around 1 PM, patient was lethargic and described as incoherent. Blood pressure was low when daughter checked. Called EMS to bring to the ER for further evaluation. Of note, patient has been consuming soft pureed foods and ice cream with protein powder. Daughter is worried that patient has been steadily losing weight.    Of note, patient started chemotherapy on April 2023 with FOLOFOX and CEA was being trended. Initially downtrended so oncologist stated treatment was working. Recently got bloodwork with  in November 2023 so plan is to switch treatment to irinotecan, leucovorin, 5-FU, and Mvasi immunotherapy on 12/7/23. Last chemotherapy session was on 11/1/23 and supposed to receive every two weeks. Also received Neupogen with chemotherapy infusion. However, given recurrent abdominal ascites, patient has required three therapeutic paracenteses within the past two weeks. This resulted in inability to attend chemotherapy session due to frequent reaccumulation of ascites.     In the ED, afebrile, HR as elevated as 104, BP as low as 86/56. WBC 17.95K, hgb 11.2, plts 105K, sodium 129, bicarb 21, BUN 76, Cr 1.85, albumin 2.0, alkphos 599, , ALT 51. Lactic acid 2.1. CT chest without PNA but with evidence of pulmonary metastases. CT A/P with hepatic metastases, wall-thickened  small bowel and ascending/transverse colon suggests enterocolitis. No bowel obstruction. Large volume ascites. Received 2L NS bolus and morphine 2 mg IV.  (30 Nov 2023 20:51)    The patient underwent drainage large volume ascites (>4L) with IR yesterday (12/4/23) with IR. She is feeling better overall with the drainage, but still feeling very weak, tired, with abdominal discomfort and minimal appetite.  Oncology consulted for further recommendations.         Allergies    No Known Allergies    Intolerances        MEDICATIONS  (STANDING):  chlorhexidine 2% Cloths 1 Application(s) Topical daily  heparin   Injectable 5000 Unit(s) SubCutaneous every 12 hours  montelukast 10 milliGRAM(s) Oral daily  pantoprazole    Tablet 40 milliGRAM(s) Oral before breakfast  polyethylene glycol 3350 17 Gram(s) Oral two times a day  QUEtiapine 50 milliGRAM(s) Oral at bedtime  senna 2 Tablet(s) Oral at bedtime  sertraline 50 milliGRAM(s) Oral daily  sucralfate 1 Gram(s) Oral <User Schedule>    MEDICATIONS  (PRN):  acetaminophen     Tablet .. 650 milliGRAM(s) Oral every 6 hours PRN Temp greater or equal to 38C (100.4F), Mild Pain (1 - 3)  albuterol    90 MICROgram(s) HFA Inhaler 2 Puff(s) Inhalation every 6 hours PRN Shortness of Breath and/or Wheezing  ALPRAZolam 0.5 milliGRAM(s) Oral three times a day PRN anxiety  HYDROmorphone   Tablet 4 milliGRAM(s) Oral every 6 hours PRN Moderate Pain (4 - 6)  melatonin 3 milliGRAM(s) Oral at bedtime PRN Insomnia      PAST MEDICAL & SURGICAL HISTORY:  Hypertension      Anxiety      Adenocarcinoma of small intestine, stage 4      COPD (chronic obstructive pulmonary disease)      No significant past surgical history          FAMILY HISTORY:  FH: myocardial infarction  Mother, Father, and Brother        SOCIAL HISTORY: No EtOH, no tobacco    REVIEW OF SYSTEMS:    CONSTITUTIONAL: No weakness, fevers or chills  EYES/ENT: No visual changes;  No vertigo or throat pain   NECK: No pain or stiffness  RESPIRATORY: No cough, wheezing, hemoptysis; No shortness of breath  CARDIOVASCULAR: No chest pain or palpitations  GASTROINTESTINAL: No abdominal or epigastric pain. No nausea, vomiting, or hematemesis; No diarrhea or constipation. No melena or hematochezia.  GENITOURINARY: No dysuria, frequency or hematuria  NEUROLOGICAL: No numbness or weakness  SKIN: No itching, burning, rashes, or lesions   All other review of systems is negative unless indicated above.        T(F): 97.5 (12-05-23 @ 10:24), Max: 98 (12-04-23 @ 23:29)  HR: 101 (12-05-23 @ 10:24)  BP: 107/75 (12-05-23 @ 10:24)  RR: 20 (12-05-23 @ 10:24)  SpO2: 96% (12-05-23 @ 10:24)  Wt(kg): --    GENERAL: NAD, well-developed  HEAD:  Atraumatic, Normocephalic  EYES: EOMI, PERRLA, conjunctiva and sclera clear  NECK: Supple, No JVD  CHEST/LUNG: Clear to auscultation bilaterally; No wheeze  HEART: Regular rate and rhythm; No murmurs, rubs, or gallops  ABDOMEN: Soft, Nontender, Nondistended; Bowel sounds present  EXTREMITIES:  2+ Peripheral Pulses, No clubbing, cyanosis, or edema  NEUROLOGY: non-focal  SKIN: No rashes or lesions      < from: CT Abdomen and Pelvis w/ IV Cont (11.30.23 @ 18:09) >    ACC: [id number] EXAM:  CT ABDOMEN AND PELVIS IC   ORDERED BY: GARO DIAZ     PROCEDURE DATE:  11/30/2023          INTERPRETATION:  CLINICAL INFORMATION: Intestinal cancer with metastatic   disease. Abdominal pain and weakness.    COMPARISON: None.    CONTRAST/COMPLICATIONS:  IV Contrast: Omnipaque 350  96 cc administered   04 cc discarded  Oral Contrast: NONE  Complications: None reported at time of study completion    PROCEDURE:  CT of the Abdomen and Pelvis was performed.  Sagittal and coronal reformats were performed.    FINDINGS:  LOWER CHEST: Nodular opacities at the lung bases. Partially imaged   catheter tip terminating in the right atrium.    LIVER: Nodular liver contour. Numerous hepatic masses, consistent with   metastases given history of intestinal cancer. Largest mass is within the   left hepatic lobe measures 7.9 x 4.4 cm. A right hepatic lobe mass   measures 4.4 x 2.8 cm.  BILE DUCTS: Normal caliber.  GALLBLADDER: Within normal limits.  SPLEEN: Mildly enlarged.  PANCREAS: Diffuse parenchymal atrophy.  ADRENALS: Within normal limits.  KIDNEYS/URETERS: Within normal limits.    BLADDER: Within normal limits.  REPRODUCTIVE ORGANS: Bilobed right adnexal cyst measuring 3.2 cm.    BOWEL: Gastrojejunostomy. No bowel obstruction. Wall thickened loops of   small bowel. Wall thickening of the right hemicolon and transverse colon.  PERITONEUM: Large volume ascites.  VESSELS: Atherosclerotic change.  RETROPERITONEUM/LYMPH NODES: Enlarged retroperitoneal/upper abdominal and   right pelvic lymph nodes.  ABDOMINAL WALL: Within normal limits.  BONES: Within normal limits.    IMPRESSION:  1.  Hepatic metastatic disease.  2.  Wall-thickened small bowel and ascending/transverse colon suggests   enterocolitis. No bowel obstruction.  3.  Large volume ascites.      --- End of Report ---            GAMALIEL SHIPLEY DO; Attending Radiologist  This document has been electronically signed. Nov 30 2023  6:34PM    < end of copied text >  < from: CT Chest No Cont (11.30.23 @ 18:08) >    ACC: [id number] EXAM:  CT CHEST   ORDERED BY: GARO DIAZ     PROCEDURE DATE:  11/30/2023          INTERPRETATION:  Clinical information: Evaluate for pneumonia.    CT scan of the chest was obtained without administration of intravenous   contrast.    Few lymph nodes are present in the pretracheal space.    Heart is normal in size. Right-sided Mediport catheter is noted in place.   Calcification of coronary arteries is noted. No pericardial effusion is   noted.    No endobronchial lesions are noted. Numerous less than 0.7 cm nodules are   noted within both lungs. No pleural effusions are noted.    For interpretation of the abdominal contents, please see report of the CT   scan of the abdomen performed on the same day.    Degenerative changes of the spine are noted.    IMPRESSION: There is no pneumonia.    Findings suggestive of bilateral pulmonary metastasis.    --- End of Report ---            MICHELE CLARK MD; Attending Radiologist  This document has been electronically signed. Nov 30 2023  6:25PM    < end of copied text >  < from: US Abdomen Limited (12.04.23 @ 09:05) >    ACC: [id number] EXAM:  US ABDOMEN LIMITED   ORDERED BY: RICHARD MYERS     PROCEDURE DATE:  12/04/2023          INTERPRETATION:  CLINICAL INFORMATION: Evaluate ascites for paracentesis    COMPARISON: CT abdomen and pelvis 11/30/2023.    TECHNIQUE: Limited ultrasound of the abdomen to evaluate for ascites.    There is a large amount of ascites in all 4 quadrants, similar to recent   CT    IMPRESSION:  Large volume ascites.        --- End of Report ---            BERE GONSALEZ MD; Attending Radiologist  This document has been electronically signed. Dec  4 2023  9:12AM    < end of copied text >                 Oncology Consult Note    HPI:  Veronica Napoles is a 63 year old female with PMHx of HTN, anxiety, COPD, esophagitis, and stage IV duodenal CA (s/p gastrojejunostomy in 3/2023, on chemotherapy since 4/2023) who presented to the ED on 11/30/23 for complaints of lethargy and hypotension.    History primarily obtained from daughter. Daughter reports she changed patient into her pajamas this morning before she left for work around 9:45 AM. Patient was also up to the bathroom three times by herself. When daughter got home around 1 PM, patient was lethargic and described as incoherent. Blood pressure was low when daughter checked. Called EMS to bring to the ER for further evaluation. Of note, patient has been consuming soft pureed foods and ice cream with protein powder. Daughter is worried that patient has been steadily losing weight.    Of note, patient started chemotherapy on April 2023 with FOLOFOX and CEA was being trended. Initially downtrended so oncologist stated treatment was working. Recently got bloodwork with  in November 2023 so plan is to switch treatment to irinotecan, leucovorin, 5-FU, and Mvasi immunotherapy on 12/7/23. Last chemotherapy session was on 11/1/23 and supposed to receive every two weeks. Also received Neupogen with chemotherapy infusion. However, given recurrent abdominal ascites, patient has required three therapeutic paracenteses within the past two weeks. This resulted in inability to attend chemotherapy session due to frequent reaccumulation of ascites.     In the ED, afebrile, HR as elevated as 104, BP as low as 86/56. WBC 17.95K, hgb 11.2, plts 105K, sodium 129, bicarb 21, BUN 76, Cr 1.85, albumin 2.0, alkphos 599, , ALT 51. Lactic acid 2.1. CT chest without PNA but with evidence of pulmonary metastases. CT A/P with hepatic metastases, wall-thickened  small bowel and ascending/transverse colon suggests enterocolitis. No bowel obstruction. Large volume ascites. Received 2L NS bolus and morphine 2 mg IV.  (30 Nov 2023 20:51)    The patient underwent drainage large volume ascites (>4L) with IR yesterday (12/4/23) with IR. She is feeling better overall with the drainage, but still feeling very weak, tired, with abdominal discomfort and minimal appetite.  Oncology consulted for further recommendations.         Allergies    No Known Allergies    Intolerances        MEDICATIONS  (STANDING):  chlorhexidine 2% Cloths 1 Application(s) Topical daily  heparin   Injectable 5000 Unit(s) SubCutaneous every 12 hours  montelukast 10 milliGRAM(s) Oral daily  pantoprazole    Tablet 40 milliGRAM(s) Oral before breakfast  polyethylene glycol 3350 17 Gram(s) Oral two times a day  QUEtiapine 50 milliGRAM(s) Oral at bedtime  senna 2 Tablet(s) Oral at bedtime  sertraline 50 milliGRAM(s) Oral daily  sucralfate 1 Gram(s) Oral <User Schedule>    MEDICATIONS  (PRN):  acetaminophen     Tablet .. 650 milliGRAM(s) Oral every 6 hours PRN Temp greater or equal to 38C (100.4F), Mild Pain (1 - 3)  albuterol    90 MICROgram(s) HFA Inhaler 2 Puff(s) Inhalation every 6 hours PRN Shortness of Breath and/or Wheezing  ALPRAZolam 0.5 milliGRAM(s) Oral three times a day PRN anxiety  HYDROmorphone   Tablet 4 milliGRAM(s) Oral every 6 hours PRN Moderate Pain (4 - 6)  melatonin 3 milliGRAM(s) Oral at bedtime PRN Insomnia      PAST MEDICAL & SURGICAL HISTORY:  Hypertension      Anxiety      Adenocarcinoma of small intestine, stage 4      COPD (chronic obstructive pulmonary disease)      No significant past surgical history          FAMILY HISTORY:  FH: myocardial infarction  Mother, Father, and Brother        SOCIAL HISTORY: No EtOH, no tobacco    REVIEW OF SYSTEMS:    CONSTITUTIONAL: No weakness, fevers or chills  EYES/ENT: No visual changes;  No vertigo or throat pain   NECK: No pain or stiffness  RESPIRATORY: No cough, wheezing, hemoptysis; No shortness of breath  CARDIOVASCULAR: No chest pain or palpitations  GASTROINTESTINAL: No abdominal or epigastric pain. No nausea, vomiting, or hematemesis; No diarrhea or constipation. No melena or hematochezia.  GENITOURINARY: No dysuria, frequency or hematuria  NEUROLOGICAL: No numbness or weakness  SKIN: No itching, burning, rashes, or lesions   All other review of systems is negative unless indicated above.        T(F): 97.5 (12-05-23 @ 10:24), Max: 98 (12-04-23 @ 23:29)  HR: 101 (12-05-23 @ 10:24)  BP: 107/75 (12-05-23 @ 10:24)  RR: 20 (12-05-23 @ 10:24)  SpO2: 96% (12-05-23 @ 10:24)  Wt(kg): --    GENERAL: NAD, well-developed  HEAD:  Atraumatic, Normocephalic  EYES: EOMI, PERRLA, conjunctiva and sclera clear  NECK: Supple, No JVD  CHEST/LUNG: Clear to auscultation bilaterally; No wheeze  HEART: Regular rate and rhythm; No murmurs, rubs, or gallops  ABDOMEN: Soft, Nontender, Nondistended; Bowel sounds present  EXTREMITIES:  2+ Peripheral Pulses, No clubbing, cyanosis, or edema  NEUROLOGY: non-focal  SKIN: No rashes or lesions      < from: CT Abdomen and Pelvis w/ IV Cont (11.30.23 @ 18:09) >    ACC: [id number] EXAM:  CT ABDOMEN AND PELVIS IC   ORDERED BY: GARO DIAZ     PROCEDURE DATE:  11/30/2023          INTERPRETATION:  CLINICAL INFORMATION: Intestinal cancer with metastatic   disease. Abdominal pain and weakness.    COMPARISON: None.    CONTRAST/COMPLICATIONS:  IV Contrast: Omnipaque 350  96 cc administered   04 cc discarded  Oral Contrast: NONE  Complications: None reported at time of study completion    PROCEDURE:  CT of the Abdomen and Pelvis was performed.  Sagittal and coronal reformats were performed.    FINDINGS:  LOWER CHEST: Nodular opacities at the lung bases. Partially imaged   catheter tip terminating in the right atrium.    LIVER: Nodular liver contour. Numerous hepatic masses, consistent with   metastases given history of intestinal cancer. Largest mass is within the   left hepatic lobe measures 7.9 x 4.4 cm. A right hepatic lobe mass   measures 4.4 x 2.8 cm.  BILE DUCTS: Normal caliber.  GALLBLADDER: Within normal limits.  SPLEEN: Mildly enlarged.  PANCREAS: Diffuse parenchymal atrophy.  ADRENALS: Within normal limits.  KIDNEYS/URETERS: Within normal limits.    BLADDER: Within normal limits.  REPRODUCTIVE ORGANS: Bilobed right adnexal cyst measuring 3.2 cm.    BOWEL: Gastrojejunostomy. No bowel obstruction. Wall thickened loops of   small bowel. Wall thickening of the right hemicolon and transverse colon.  PERITONEUM: Large volume ascites.  VESSELS: Atherosclerotic change.  RETROPERITONEUM/LYMPH NODES: Enlarged retroperitoneal/upper abdominal and   right pelvic lymph nodes.  ABDOMINAL WALL: Within normal limits.  BONES: Within normal limits.    IMPRESSION:  1.  Hepatic metastatic disease.  2.  Wall-thickened small bowel and ascending/transverse colon suggests   enterocolitis. No bowel obstruction.  3.  Large volume ascites.      --- End of Report ---            GAMALIEL SHIPLEY DO; Attending Radiologist  This document has been electronically signed. Nov 30 2023  6:34PM    < end of copied text >  < from: CT Chest No Cont (11.30.23 @ 18:08) >    ACC: [id number] EXAM:  CT CHEST   ORDERED BY: GARO DIAZ     PROCEDURE DATE:  11/30/2023          INTERPRETATION:  Clinical information: Evaluate for pneumonia.    CT scan of the chest was obtained without administration of intravenous   contrast.    Few lymph nodes are present in the pretracheal space.    Heart is normal in size. Right-sided Mediport catheter is noted in place.   Calcification of coronary arteries is noted. No pericardial effusion is   noted.    No endobronchial lesions are noted. Numerous less than 0.7 cm nodules are   noted within both lungs. No pleural effusions are noted.    For interpretation of the abdominal contents, please see report of the CT   scan of the abdomen performed on the same day.    Degenerative changes of the spine are noted.    IMPRESSION: There is no pneumonia.    Findings suggestive of bilateral pulmonary metastasis.    --- End of Report ---            MICHELE CLARK MD; Attending Radiologist  This document has been electronically signed. Nov 30 2023  6:25PM    < end of copied text >  < from: US Abdomen Limited (12.04.23 @ 09:05) >    ACC: [id number] EXAM:  US ABDOMEN LIMITED   ORDERED BY: RICHARD MYERS     PROCEDURE DATE:  12/04/2023          INTERPRETATION:  CLINICAL INFORMATION: Evaluate ascites for paracentesis    COMPARISON: CT abdomen and pelvis 11/30/2023.    TECHNIQUE: Limited ultrasound of the abdomen to evaluate for ascites.    There is a large amount of ascites in all 4 quadrants, similar to recent   CT    IMPRESSION:  Large volume ascites.        --- End of Report ---            BERE GONSALEZ MD; Attending Radiologist  This document has been electronically signed. Dec  4 2023  9:12AM    < end of copied text >

## 2023-12-06 NOTE — CHART NOTE - NSCHARTNOTEFT_GEN_A_CORE
RD Chart Note    Palliative care asked RD to speak with family about providing pt with additional nutrition supplements. Per RD's discussion with pt's family, they would like to pt's diet consistency to be changed from minced and moist to pureed, as they feel she is not able to eat the minced and moist consistency and it is not soft enough. Family states pt not drinking much of Kardium Standard 1.0 nutrition supplement; family amenable to additional nutrition supplements, including LPS nutrition supplement, Orgain (plant-based) nutrition shake, and Ensure Clear supplements for additional nutrition, considering pt not eating meals. RD remains available and will follow up as per protocol. RD Chart Note    Palliative care asked RD to speak with family about providing pt with additional nutrition supplements. Per RD's discussion with pt's family, they would like to pt's diet consistency to be changed from minced and moist to pureed, as they feel she is not able to eat the minced and moist consistency and it is not soft enough. Family states pt not drinking much of [employer] Standard 1.0 nutrition supplement; family amenable to additional nutrition supplements, including LPS nutrition supplement, Orgain (plant-based) nutrition shake, and Ensure Clear supplements for additional nutrition, considering pt not eating meals. RD remains available and will follow up as per protocol. RD Chart Note    Palliative care asked RD to speak with family about providing pt with additional nutrition supplements. Per RD's discussion with pt's family, they would like to pt's diet consistency to be changed from minced and moist to pureed, as they feel she is not able to eat the minced and moist consistency and it is not soft enough. Family states pt not drinking EsLife Standard 1.0 nutrition supplement; will discontinue per request. Family has been bringing in Ensure Clear supplements which pt taking sips of. Communicated to family that we will provide Ensure Clear supplement during pt's admission. Family amenable to additional nutrition supplements, including LPS nutrition supplement (2 LPS per meal), Orgain plant-based nutrition shake (in place of Ensure Plant Based nutrition supplement) with each meal, and as previous mentioned, Ensure Clear supplements with each meal, for additional nutrition, considering pt not eating meals. Will continue with EsLife Standard 1.0 nutrition supplement x 2/day. RD remains available and will follow up as per protocol. RD Chart Note    Palliative care asked RD to speak with family about providing pt with additional nutrition supplements. Per RD's discussion with pt's family, they would like to pt's diet consistency to be changed from minced and moist to pureed, as they feel she is not able to eat the minced and moist consistency and it is not soft enough. Family states pt not drinking GalaDo Standard 1.0 nutrition supplement; will discontinue per request. Family has been bringing in Ensure Clear supplements which pt taking sips of. Communicated to family that we will provide Ensure Clear supplement during pt's admission. Family amenable to additional nutrition supplements, including LPS nutrition supplement (2 LPS per meal), Orgain plant-based nutrition shake (in place of Ensure Plant Based nutrition supplement) with each meal, and as previous mentioned, Ensure Clear supplements with each meal, for additional nutrition, considering pt not eating meals. Will continue with GalaDo Standard 1.0 nutrition supplement x 2/day. RD remains available and will follow up as per protocol. RD Chart Note    Palliative care asked RD to speak with family about providing pt with additional nutrition supplements. Per RD's discussion with pt's family, they would like to pt's diet consistency to be changed from minced and moist to pureed, as they feel she is not able to eat the minced and moist consistency and it is not soft enough. Family states pt not drinking [employer] Standard 1.0 nutrition supplement; will discontinue per request. Family has been bringing in Ensure Clear supplements which pt taking sips of. Communicated to family that we will provide Ensure Clear supplement during pt's admission. Family amenable to additional nutrition supplements, including LPS nutrition supplement (2 LPS per meal), Orgain plant-based nutrition shake (in place of Ensure Plant Based nutrition supplement) with each meal, and as previous mentioned, Ensure Clear supplements with each meal, for additional nutrition, considering pt not eating meals. RD remains available and will follow up as per protocol. RD Chart Note    Palliative care asked RD to speak with family about providing pt with additional nutrition supplements. Per RD's discussion with pt's family, they would like to pt's diet consistency to be changed from minced and moist to pureed, as they feel she is not able to eat the minced and moist consistency and it is not soft enough. Family states pt not drinking [employer] Standard 1.0 nutrition supplement; will discontinue per request. Family has been bringing in Ensure Clear supplements which pt taking sips of. Communicated to family that we will provide Ensure Clear supplement during pt's admission. Family amenable to additional nutrition supplements, including LPS nutrition supplement (2 LPS per meal), Orgain plant-based nutrition shake (in place of Ensure Plant Based nutrition supplement) with each meal, and as previous mentioned, Ensure Clear supplements with each meal, for additional nutrition, considering pt not eating meals. RD remains available and will follow up as per protocol.

## 2023-12-06 NOTE — CONSULT NOTE ADULT - SUBJECTIVE AND OBJECTIVE BOX
HPI:  Veronica Napoles is a 63 year old female with PMHx of HTN, anxiety, COPD, esophagitis, and stage IV duodenal CA (s/p gastrojejunostomy in 3/2023, on chemotherapy since 4/2023) who presented to the ED on 11/30/23 for complaints of lethargy and hypotension.    History primarily obtained from daughter, Milagro at bedside. Daughter reports she changed patient into her pajamas this morning before she left for work around 9:45 AM. Patient was also up to the bathroom three times by herself. When daughter got home around 1 PM, patient was lethargic and described as incoherent. Blood pressure was low when daughter checked. Called EMS to bring to the ER for further evaluation. Of note, patient has been consuming soft pureed foods and ice cream with protein powder. Daughter is worried that patient has been steadily loosing weight and requested nutrition consultation.    Of note, patient started chemotherapy on April 2023 with FOLOFOX and CEA was being trended. Initially downtrended so oncologist stated treatment was working. Recently got bloodwork with  in November 2023 so plan is to switch treatment to irinotecan, leucovorin, 5-FU, and Mvasi immunotherapy on 12/7/23. Last chemotherapy session was on 11/1/23 and supposed to receive every two weeks. Also received Neupogen with chemotherapy infusion. However, given recurrent abdominal ascites, patient has required three therapeutic paracenteses within the past two weeks. This resulted in inability to attend chemotherapy session due to frequent reaccumulation of ascites.     In the ED, afebrile, HR as elevated as 104, BP as low as 86/56. WBC 17.95K, hgb 11.2, plts 105K, sodium 129, bicarb 21, BUN 76, Cr 1.85, albumin 2.0, alkphos 599, , ALT 51. Lactic acid 2.1. CT chest without PNA but with evidence of pulmonary metastases. CT A/P with hepatic metastases, wall-thickened  small bowel and ascending/transverse colon suggests enterocolitis. No bowel obstruction. Large volume ascites. Received 2L NS bolus and morphine 2 mg IV.  (30 Nov 2023 20:51)    PERTINENT PM/SXH:   Hypertension    Anxiety    Asthma    HLD (hyperlipidemia)    Adenocarcinoma of small intestine, stage 4    COPD without exacerbation    COPD (chronic obstructive pulmonary disease)      No significant past surgical history      FAMILY HISTORY:  FH: myocardial infarction  Mother, Father, and Brother      ITEMS NOT CHECKED ARE NOT PRESENT    SOCIAL HISTORY:   Significant other/partner:  [ ]  Children: yes [ ]  Mosque/Spirituality: Scientologist  Substance hx:  [ ]   Tobacco hx:  [ ]   Alcohol hx: [ ]   Home Opioid hx:  [ ] I-Stop Reference No: Reference #: [id number]    Practitioner Count: 3  Pharmacy Count: 1  Current Opioid Prescriptions: 2  Current Benzodiazepine Prescriptions: 0  Current Stimulant Prescriptions: 0      Patient Demographic Information (PDI)       PDI	First Name	Last Name	Birth Date	Gender	Street Address	[facility]	Zip Code  ROBERTA Napoles	1960	Female	[address]	[place]	[address]    Prescription Information      PDI Filter:    PDI	My Rx	Current Rx	Drug Type	Rx Written	Rx Dispensed	Drug	Quantity	Days Supply	Prescriber Name	Prescriber ALYX #	Payment Method	Dispenser  A	N	Y	O	11/24/2023	11/25/2023	hydromorphone 4 mg tablet	60	15	Andrew Claudio	[id number]	Medicaid	[id number]  A	N	Y	O	11/17/2023	11/19/2023	morphine sulf er 15 mg tablet	60	30	Andrew Claudio	[id number]	Medicaid	[facility]  A	N	N	O	10/30/2023	10/30/2023	hydromorphone 4 mg tablet	60	15	Andrew Claudio	[id number]	Medicaid	[facility]  A	N	N	B	10/26/2023	10/28/2023	alprazolam 0.5 mg tablet	90	15	Vikki Zaragoza	[id number]	Medicaid	[facility]  A	N	N	O	10/09/2023	10/21/2023	morphine sulf er 15 mg tablet	60	30	Andrew Claudio	[id number]	Medicaid	[facility]  A	N	N	O	10/09/2023	10/10/2023	hydromorphone 4 mg tablet	60	20	Andrew Claudio	[id number]	Medicaid	[facility]  A	N	N	B	09/25/2023	09/26/2023	alprazolam 0.5 mg tablet	90	15	Renny Espinoza	[id number]	Medicaid	[facility]  A	N	N	O	09/12/2023	09/15/2023	morphine sulf er 15 mg tablet	60	30	Zanartu, Andrew	[id number]	Medicaid	[facility]  A	N	N	O	09/12/2023	09/12/2023	hydromorphone 2 mg tablet	60	20	Zanartu, Andrew	[id number]	Medicaid	[facility]  A	N	N	B	08/24/2023	08/28/2023	alprazolam 0.5 mg tablet	90	15	Vikki [id number]	Medicaid	[facility]  A	N	N	O	08/16/2023	08/17/2023	morphine sulf er 15 mg tablet	60	30	Zanartu, Andrew	[id number]	Medicaid	[facility]  A	N	N	O	08/03/2023	08/04/2023	hydromorphone 2 mg tablet	60	20	Zanartu, Andrew	[id number]	Medicaid	[facility]  A	N	N	B	07/28/2023	07/28/2023	alprazolam 0.5 mg tablet	90	15	Vikki Zaragoza	[id number]	Medicaid	[facility]  A	N	N	O	07/06/2023	07/12/2023	morphine sulf er 15 mg tablet	60	30	Zanartu, Andrew	[id number]	Medicaid	[facility]  A	N	N	O	07/06/2023	07/07/2023	hydromorphone 2 mg tablet	60	20	Zanartu, Andrew	[id number]	Medicaid	[facility]  A	N	N	B	06/27/2023	06/27/2023	alprazolam 0.5 mg tablet	90	15	Vikki Zaragoza	[id number]	Medicaid	[facility]  A	N	N	O	06/13/2023	06/13/2023	morphine sulf er 15 mg tablet	60	30	Zanartu, Andrew	[id number]	Medicaid	[facility]  A	N	N	O	06/06/2023	06/06/2023	hydromorphone 2 mg tablet	60	20	Zanartu, Andrew	[id number]	[id number]  A	N	N	B	05/25/2023	05/25/2023	alprazolam 0.5 mg tablet	90	15	[facility]	[id number]	Medicaid	[facility]  A	N	N	O	05/02/2023	05/02/2023	morphine sulf er 15 mg tablet	60	30	Andrew Claudio	[id number]	Medicaid	[facility]  A	N	N	O	05/02/2023	05/02/2023	hydromorphone 2 mg tablet	60	20	Andrew Claudio	[id number]	Medicaid	[facility]  A	N	N	B	04/10/2023	04/22/2023	alprazolam 0.5 mg tablet	90	15	Vikki Zaragoza	[id number]	Medicaid	[facility]  A	N	N	O	04/14/2023	04/18/2023	diphenoxylate-atropine 2.5-0.025 mg tablet	240	60	Stanley Mckee MD	[id number]	Medicaid	[facility]  A	N	N	B	04/14/2023	04/14/2023	lorazepam 0.5 mg tablet	90	30	Stanley Mckee MD	[id number]	Medicaid	[facility]  A	N	N	B	04/05/2023	04/07/2023	alprazolam 0.5 mg tablet	90	15	Vikki Zaragoza	[id number]	Medicaid	[facility]  A	N	N	O	03/14/2023	03/14/2023	fentanyl 25 mcg/hr patch	10	30	Andrew Claudio	[id number]	Medicaid	[facility]  A	N	N	B	03/13/2023	03/13/2023	alprazolam 0.5 mg tablet	90	15	Vikki Zaragoza	[id number]	Medicaid	[facility]  A	N	N	O	03/02/2023	03/02/2023	fentanyl 12 mcg/hr patch	10	30	Andrew Claudio	[id number]	Medicaid	[facility]  A	N	N	B	02/10/2023	02/11/2023	alprazolam 0.5 mg tablet	90	15	Vikki Zaragoza	[id number]	Medicaid	[facility]  A	N	N	B	01/18/2023	01/18/2023	alprazolam 0.5 mg tablet	90	15	Vikki Zaragoza	[id number]	Medicaid	[facility]  A	N	N	B	11/22/2022	12/08/2022	alprazolam 0.5 mg tablet	90	15	Vikki Zaragoza	[id number]	Medicaid	[facility]  Living Situation: [ ]Home  [ ]Long term care  [ ]Rehab [ ]Other    ADVANCE DIRECTIVES:    DNR  MOLST  [ ]  Living Will  [ ]   DECISION MAKER(s):  [ ] Health Care Proxy(s)  [ x] Surrogate(s)  [ ] Guardian           Name(s): Phone Number(s): Mino Parson [phone]    BASELINE (I)ADL(s) (prior to admission):  [place]: [ ]Total  [ ] Moderate [ ]Dependent    Allergies    No Known Allergies    Intolerances    MEDICATIONS  (STANDING):  chlorhexidine 2% Cloths 1 Application(s) Topical daily  heparin   Injectable 5000 Unit(s) SubCutaneous every 12 hours  montelukast 10 milliGRAM(s) Oral daily  pantoprazole    Tablet 40 milliGRAM(s) Oral before breakfast  polyethylene glycol 3350 17 Gram(s) Oral two times a day  QUEtiapine 50 milliGRAM(s) Oral at bedtime  senna 2 Tablet(s) Oral at bedtime  sertraline 50 milliGRAM(s) Oral daily  sucralfate 1 Gram(s) Oral <User Schedule>    MEDICATIONS  (PRN):  acetaminophen     Tablet .. 650 milliGRAM(s) Oral every 6 hours PRN Temp greater or equal to 38C (100.4F), Mild Pain (1 - 3)  albuterol    90 MICROgram(s) HFA Inhaler 2 Puff(s) Inhalation every 6 hours PRN Shortness of Breath and/or Wheezing  ALPRAZolam 0.5 milliGRAM(s) Oral three times a day PRN anxiety  HYDROmorphone   Tablet 4 milliGRAM(s) Oral every 6 hours PRN Moderate Pain (4 - 6)  melatonin 3 milliGRAM(s) Oral at bedtime PRN Insomnia  ondansetron Injectable 4 milliGRAM(s) IV Push every 6 hours PRN Nausea and/or Vomiting    PRESENT SYMPTOMS: [ ]Unable to obtain due to poor mentation   Source if other than patient:  [ ]Family   [ ]Team     Pain: [ ] yes [ ] no  QOL impact -   Location -                    Aggravating factors -  Quality -  Radiation -  Timing-  Severity (0-10 scale):  Minimal acceptable level (0-10 scale):     PAIN AD Score:     [email or web address]/[email or web address].pdf (press ctrl +  left click to view)    Dyspnea:                           [ ]Mild [ ]Moderate [ ]Severe  Anxiety:                             [ ]Mild [ ]Moderate [ ]Severe  Fatigue:                             [ ]Mild [ ]Moderate [ ]Severe  Nausea:                             [ ]Mild [ ]Moderate [ ]Severe  Loss of appetite:              [ ]Mild [ ]Moderate [ ]Severe  Constipation:                    [ ]Mild [ ]Moderate [ ]Severe    Other Symptoms:  [ ]All other review of systems negative     Karnofsky Performance Score/Palliative Performance Status Version 2:         %    http://npcrc.org/files/news/palliative_performance_scale_ppsv2.pdf  PHYSICAL EXAM:  Vital Signs Last 24 Hrs  T(C): 36.4 (06 Dec 2023 11:17), Max: 36.6 (06 Dec 2023 04:51)  T(F): 97.6 (06 Dec 2023 11:17), Max: 97.9 (06 Dec 2023 04:51)  HR: 80 (06 Dec 2023 11:17) (80 - 103)  BP: 107/76 (06 Dec 2023 11:17) (102/75 - 127/93)  BP(mean): --  RR: 18 (06 Dec 2023 11:17) (18 - 18)  SpO2: 97% (06 Dec 2023 11:17) (95% - 98%)    Parameters below as of 05 Dec 2023 16:22  Patient On (Oxygen Delivery Method): room air     I&O's Summary    05 Dec 2023 07:01  -  06 Dec 2023 07:00  --------------------------------------------------------  IN: 240 mL / OUT: 0 mL / NET: 240 mL    GENERAL:  [ ]Alert  [ ]Oriented x   [ ]Lethargic  [ ]Cachexia  [ ]Unarousable  [ ]Verbal  [ ]Non-Verbal  Behavioral:   [ ] Anxiety  [ ] Delirium [ ] Agitation [ ] Other  HEENT:  [ ]Normal   [ ]Dry mouth   [ ]ET Tube/Trach  [ ]Oral lesions  PULMONARY:   [ ]Clear [ ]Tachypnea  [ ]Audible excessive secretions   [ ]Rhonchi        [ ]Right [ ]Left [ ]Bilateral  [ ]Crackles        [ ]Right [ ]Left [ ]Bilateral  [ ]Wheezing     [ ]Right [ ]Left [ ]Bilateral  CARDIOVASCULAR:    [ ]Regular [ ]Irregular [ ]Tachy  [ ]Rashad [ ]Murmur [ ]Other  GASTROINTESTINAL:  [ ]Soft  [ ]Distended   [ ]+BS  [ ]Non tender [ ]Tender  [ ]PEG [ ]OGT/ NGT  Last BM: 12/5/23 GENITOURINARY:  [ ]Normal [ ] Incontinent   [ ]Oliguria/Anuria   [ ]Ramírez  MUSCULOSKELETAL:   [ ]Normal   [ ]Weakness  [ ]Bed/Wheelchair bound [ ]Edema  NEUROLOGIC:   [ ]No focal deficits  [ ] Cognitive impairment  [ ] Dysphagia [ ]Dysarthria [ ] Paresis [ ]Other   SKIN:   [ ]Normal   [ ]Pressure ulcer(s)  [ ]Rash    CRITICAL CARE:  [ ] Shock Present  [ ]Septic [ ]Cardiogenic [ ]Neurologic [ ]Hypovolemic  [ ]  Vasopressors [ ]  Inotropes   [ ] Respiratory failure present [ ] mechanical ventilation [ ] non-invasive ventilatory support [ ] High flow  [ ] Acute  [ ] Chronic [ ] Hypoxic  [ ] Hypercarbic [ ] Other  [ ] Other organ failure     LABS:  Complete Blood Count in AM (12.03.23 @ 06:20)    Nucleated RBC: 0 /100 WBCs   WBC Count: 26.40 K/uL   RBC Count: 3.86 M/uL   Hemoglobin: 12.4 g/dL   Hematocrit: 36.1 %   Mean Cell Volume: 93.5 fl   Mean Cell Hemoglobin: 32.1 pg   Mean Cell Hemoglobin Conc: 34.3 g/dL   Red Cell Distrib Width: 19.1 %   Platelet Count - Automated: 107: Test Repeated Specimen integrity verified. K/uL    Culture - Body Fluid with Gram Stain (12.04.23 @ 09:27)    Gram Stain:   polymorphonuclear leukocytes seen  No organisms seen  by cytocentrifuge   Specimen Source: Peritoneal Peritoneal Fluid   Culture Results:   No growth    Culture - Blood (11.30.23 @ 16:50)    Specimen Source: .Blood Blood-Peripheral   Culture Results:   No growth at 5 days    Culture - Urine (12.01.23 @ 17:00)    Specimen Source: Clean Catch Clean Catch (Midstream)   Culture Results:   <10,000 CFU/mL Normal Urogenital Giselle    RADIOLOGY & ADDITIONAL STUDIES:  < from: US Abdomen Limited (12.04.23 @ 09:05) >  IMPRESSION:  Large volume ascites.  --- End of Report ---  < end of copied text >    < from: CT Abdomen and Pelvis w/ IV Cont (11.30.23 @ 18:09) >  IMPRESSION:  1.  Hepatic metastatic disease.  2.  Wall-thickened small bowel and ascending/transverse colon suggests   enterocolitis. No bowel obstruction.  3.  Large volume ascites.  --- End of Report ---  < end of copied text >    < from: CT Chest No Cont (11.30.23 @ 18:08) >  IMPRESSION: There is no pneumonia.  Findings suggestive of bilateral pulmonary metastasis.  --- End of Report ---  < end of copied text >    < from: Xray Chest 1 View- PORTABLE-Urgent (11.30.23 @ 15:36) >  IMPRESSION: Right-sided port is new.  There are scattered infiltrates in the left mid lower lung at this time.  --- End of Report ---  < end of copied text >    PROTEIN CALORIE MALNUTRITION PRESENT: [ x] Yes [ ] No  [ ] PPSV2 < or = to 30% [ ] significant weight loss  [x ] poor nutritional intake [ x] catabolic state [ ] anasarca     Artificial Nutrition [ ]     REFERRALS:   [ ]Chaplaincy  [ ] Hospice  [ ]Child Life  [ ]Social Work  [ ]Case management [ ]Holistic Therapy     Goals of Care Document:   Care Coordination Assessment 201 [C. Provider] (12-04-23 @ 16:01)    HPI:  Veronica Napoles is a 63 year old female with PMHx of HTN, anxiety, COPD, esophagitis, and stage IV duodenal CA (s/p gastrojejunostomy in 3/2023, on chemotherapy since 4/2023) who presented to the ED on 11/30/23 for complaints of lethargy and hypotension.    History primarily obtained from daughter, Milagro at bedside. Daughter reports she changed patient into her pajamas this morning before she left for work around 9:45 AM. Patient was also up to the bathroom three times by herself. When daughter got home around 1 PM, patient was lethargic and described as incoherent. Blood pressure was low when daughter checked. Called EMS to bring to the ER for further evaluation. Of note, patient has been consuming soft pureed foods and ice cream with protein powder. Daughter is worried that patient has been steadily loosing weight and requested nutrition consultation.    Of note, patient started chemotherapy on April 2023 with FOLOFOX and CEA was being trended. Initially downtrended so oncologist stated treatment was working. Recently got bloodwork with  in November 2023 so plan is to switch treatment to irinotecan, leucovorin, 5-FU, and Mvasi immunotherapy on 12/7/23. Last chemotherapy session was on 11/1/23 and supposed to receive every two weeks. Also received Neupogen with chemotherapy infusion. However, given recurrent abdominal ascites, patient has required three therapeutic paracenteses within the past two weeks. This resulted in inability to attend chemotherapy session due to frequent reaccumulation of ascites.     In the ED, afebrile, HR as elevated as 104, BP as low as 86/56. WBC 17.95K, hgb 11.2, plts 105K, sodium 129, bicarb 21, BUN 76, Cr 1.85, albumin 2.0, alkphos 599, , ALT 51. Lactic acid 2.1. CT chest without PNA but with evidence of pulmonary metastases. CT A/P with hepatic metastases, wall-thickened  small bowel and ascending/transverse colon suggests enterocolitis. No bowel obstruction. Large volume ascites. Received 2L NS bolus and morphine 2 mg IV.  (30 Nov 2023 20:51)    PERTINENT PM/SXH:   Hypertension    Anxiety    Asthma    HLD (hyperlipidemia)    Adenocarcinoma of small intestine, stage 4    COPD without exacerbation    COPD (chronic obstructive pulmonary disease)      No significant past surgical history      FAMILY HISTORY:  FH: myocardial infarction  Mother, Father, and Brother      ITEMS NOT CHECKED ARE NOT PRESENT    SOCIAL HISTORY:   Significant other/partner:  [ ]  Children: yes [ ]  Christian/Spirituality: Voodoo  Substance hx:  [ ]   Tobacco hx:  [ ]   Alcohol hx: [ ]   Home Opioid hx:  [ ] I-Stop Reference No: Reference #: [id number]    Practitioner Count: 3  Pharmacy Count: 1  Current Opioid Prescriptions: 2  Current Benzodiazepine Prescriptions: 0  Current Stimulant Prescriptions: 0      Patient Demographic Information (PDI)       PDI	First Name	Last Name	Birth Date	Gender	Street Address	[facility]	Zip Code  ROBERTA Napoles	1960	Female	[address]	[place]	[address]    Prescription Information      PDI Filter:    PDI	My Rx	Current Rx	Drug Type	Rx Written	Rx Dispensed	Drug	Quantity	Days Supply	Prescriber Name	Prescriber ALYX #	Payment Method	Dispenser  A	N	Y	O	11/24/2023	11/25/2023	hydromorphone 4 mg tablet	60	15	Andrew Claudio	[id number]	Medicaid	[facility]  A	N	Y	O	11/17/2023	11/19/2023	morphine sulf er 15 mg tablet	60	30	Andrew Caludio	[id number]	Medicaid	[facility]  A	N	N	O	10/30/2023	10/30/2023	hydromorphone 4 mg tablet	60	15	Andrew Claudio	[id number]	Medicaid	[facility]  A	N	N	B	10/26/2023	10/28/2023	alprazolam 0.5 mg tablet	90	15	Vikki Zaragoza	[id number]	Medicaid	[facility]  A	N	N	O	10/09/2023	10/21/2023	morphine sulf er 15 mg tablet	60	30	Andrew Claudio	[id number]	Medicaid	[facility]  A	N	N	O	10/09/2023	10/10/2023	hydromorphone 4 mg tablet	60	20	Andrew Claudio	[id number]	Medicaid	[facility]  A	N	N	B	09/25/2023	09/26/2023	alprazolam 0.5 mg tablet	90	15	Renny Espinoza	[id number]	Medicaid	[facility]  A	N	N	O	09/12/2023	09/15/2023	morphine sulf er 15 mg tablet	60	30	Zanartu, Andrew	[id number]	Medicaid	[facility]  A	N	N	O	09/12/2023	09/12/2023	hydromorphone 2 mg tablet	60	20	Zanartu, Andrew	[id number]	Medicaid	[facility]  A	N	N	B	08/24/2023	08/28/2023	alprazolam 0.5 mg tablet	90	15	Vikki [id number]	Medicaid	[facility]  A	N	N	O	08/16/2023	08/17/2023	morphine sulf er 15 mg tablet	60	30	Zanartu, Andrew	[id number]	Medicaid	[facility]  A	N	N	O	08/03/2023	08/04/2023	hydromorphone 2 mg tablet	60	20	Zanartu, Andrew	[id number]	Medicaid	[facility]  A	N	N	B	07/28/2023	07/28/2023	alprazolam 0.5 mg tablet	90	15	Vikki Zaragoza	[id number]	Medicaid	[facility]  A	N	N	O	07/06/2023	07/12/2023	morphine sulf er 15 mg tablet	60	30	Zanartu, Andrew	[id number]	Medicaid	[facility]  A	N	N	O	07/06/2023	07/07/2023	hydromorphone 2 mg tablet	60	20	Zanartu, Andrew	[id number]	Medicaid	[facility]  A	N	N	B	06/27/2023	06/27/2023	alprazolam 0.5 mg tablet	90	15	Vikki Zaragoza	[id number]	Medicaid	[facility]  A	N	N	O	06/13/2023	06/13/2023	morphine sulf er 15 mg tablet	60	30	Zanartu, Andrew	[id number]	Medicaid	[facility]  A	N	N	O	06/06/2023	06/06/2023	hydromorphone 2 mg tablet	60	20	Zanartu, Andrew	[id number]	[facility]  A	N	N	B	05/25/2023	05/25/2023	alprazolam 0.5 mg tablet	90	15	[facility]	[id number]	Medicaid	[facility]  A	N	N	O	05/02/2023	05/02/2023	morphine sulf er 15 mg tablet	60	30	Andrew Claudio	[id number]	Medicaid	[facility]  A	N	N	O	05/02/2023	05/02/2023	hydromorphone 2 mg tablet	60	20	Andrew Claudio	[id number]	Medicaid	[facility]  A	N	N	B	04/10/2023	04/22/2023	alprazolam 0.5 mg tablet	90	15	Vikki Zaragoza	[id number]	Medicaid	[facility]  A	N	N	O	04/14/2023	04/18/2023	diphenoxylate-atropine 2.5-0.025 mg tablet	240	60	Stanley Mckee MD	[id number]	Medicaid	[facility]  A	N	N	B	04/14/2023	04/14/2023	lorazepam 0.5 mg tablet	90	30	Stanley Mckee MD	[id number]	Medicaid	[facility]  A	N	N	B	04/05/2023	04/07/2023	alprazolam 0.5 mg tablet	90	15	Vikki Zaragoza	[id number]	Medicaid	[facility]  A	N	N	O	03/14/2023	03/14/2023	fentanyl 25 mcg/hr patch	10	30	Andrew Claudio	[id number]	Medicaid	[facility]  A	N	N	B	03/13/2023	03/13/2023	alprazolam 0.5 mg tablet	90	15	Vikki Zaragoza	[id number]	Medicaid	[facility]  A	N	N	O	03/02/2023	03/02/2023	fentanyl 12 mcg/hr patch	10	30	Andrwe Claudio	[id number]	Medicaid	[facility]  A	N	N	B	02/10/2023	02/11/2023	alprazolam 0.5 mg tablet	90	15	Vikki Zaragoza	[id number]	Medicaid	[facility]  A	N	N	B	01/18/2023	01/18/2023	alprazolam 0.5 mg tablet	90	15	Vikki Zaragoza	[id number]	Medicaid	[facility]  A	N	N	B	11/22/2022	12/08/2022	alprazolam 0.5 mg tablet	90	15	Vikki Zaragoza	[id number]	Medicaid	[facility]  Living Situation: [ ]Home  [ ]Long term care  [ ]Rehab [ ]Other    ADVANCE DIRECTIVES:    DNR  MOLST  [ ]  Living Will  [ ]   DECISION MAKER(s):  [ ] Health Care Proxy(s)  [ x] Surrogate(s)  [ ] Guardian           Name(s): Phone Number(s): Mino Parson [phone]    BASELINE (I)ADL(s) (prior to admission):  [place]: [ ]Total  [ ] Moderate [ ]Dependent    Allergies    No Known Allergies    Intolerances    MEDICATIONS  (STANDING):  chlorhexidine 2% Cloths 1 Application(s) Topical daily  heparin   Injectable 5000 Unit(s) SubCutaneous every 12 hours  montelukast 10 milliGRAM(s) Oral daily  pantoprazole    Tablet 40 milliGRAM(s) Oral before breakfast  polyethylene glycol 3350 17 Gram(s) Oral two times a day  QUEtiapine 50 milliGRAM(s) Oral at bedtime  senna 2 Tablet(s) Oral at bedtime  sertraline 50 milliGRAM(s) Oral daily  sucralfate 1 Gram(s) Oral <User Schedule>    MEDICATIONS  (PRN):  acetaminophen     Tablet .. 650 milliGRAM(s) Oral every 6 hours PRN Temp greater or equal to 38C (100.4F), Mild Pain (1 - 3)  albuterol    90 MICROgram(s) HFA Inhaler 2 Puff(s) Inhalation every 6 hours PRN Shortness of Breath and/or Wheezing  ALPRAZolam 0.5 milliGRAM(s) Oral three times a day PRN anxiety  HYDROmorphone   Tablet 4 milliGRAM(s) Oral every 6 hours PRN Moderate Pain (4 - 6)  melatonin 3 milliGRAM(s) Oral at bedtime PRN Insomnia  ondansetron Injectable 4 milliGRAM(s) IV Push every 6 hours PRN Nausea and/or Vomiting    PRESENT SYMPTOMS: [ ]Unable to obtain due to poor mentation   Source if other than patient:  [ ]Family   [ ]Team     Pain: [ ] yes [ ] no  QOL impact -   Location -                    Aggravating factors -  Quality -  Radiation -  Timing-  Severity (0-10 scale):  Minimal acceptable level (0-10 scale):     PAIN AD Score:     [email or web address].pdf (press ctrl +  left click to view)    Dyspnea:                           [ ]Mild [ ]Moderate [ ]Severe  Anxiety:                             [ ]Mild [ ]Moderate [ ]Severe  Fatigue:                             [ ]Mild [ ]Moderate [ ]Severe  Nausea:                             [ ]Mild [ ]Moderate [ ]Severe  Loss of appetite:              [ ]Mild [ ]Moderate [ ]Severe  Constipation:                    [ ]Mild [ ]Moderate [ ]Severe    Other Symptoms:  [ ]All other review of systems negative     Karnofsky Performance Score/Palliative Performance Status Version 2:         %    http://npcrc.org/files/news/palliative_performance_scale_[email or web address].pdf  PHYSICAL EXAM:  Vital Signs Last 24 Hrs  T(C): 36.4 (06 Dec 2023 11:17), Max: 36.6 (06 Dec 2023 04:51)  T(F): 97.6 (06 Dec 2023 11:17), Max: 97.9 (06 Dec 2023 04:51)  HR: 80 (06 Dec 2023 11:17) (80 - 103)  BP: 107/76 (06 Dec 2023 11:17) (102/75 - 127/93)  BP(mean): --  RR: 18 (06 Dec 2023 11:17) (18 - 18)  SpO2: 97% (06 Dec 2023 11:17) (95% - 98%)    Parameters below as of 05 Dec 2023 16:22  Patient On (Oxygen Delivery Method): room air     I&O's Summary    05 Dec 2023 07:01  -  06 Dec 2023 07:00  --------------------------------------------------------  IN: 240 mL / OUT: 0 mL / NET: 240 mL    GENERAL:  [ ]Alert  [ ]Oriented x   [ ]Lethargic  [ ]Cachexia  [ ]Unarousable  [ ]Verbal  [ ]Non-Verbal  Behavioral:   [ ] Anxiety  [ ] Delirium [ ] Agitation [ ] Other  HEENT:  [ ]Normal   [ ]Dry mouth   [ ]ET Tube/Trach  [ ]Oral lesions  PULMONARY:   [ ]Clear [ ]Tachypnea  [ ]Audible excessive secretions   [ ]Rhonchi        [ ]Right [ ]Left [ ]Bilateral  [ ]Crackles        [ ]Right [ ]Left [ ]Bilateral  [ ]Wheezing     [ ]Right [ ]Left [ ]Bilateral  CARDIOVASCULAR:    [ ]Regular [ ]Irregular [ ]Tachy  [ ]Rashad [ ]Murmur [ ]Other  GASTROINTESTINAL:  [ ]Soft  [ ]Distended   [ ]+BS  [ ]Non tender [ ]Tender  [ ]PEG [ ]OGT/ NGT  Last BM: 12/5/23 GENITOURINARY:  [ ]Normal [ ] Incontinent   [ ]Oliguria/Anuria   [ ]Ramírez  MUSCULOSKELETAL:   [ ]Normal   [ ]Weakness  [ ]Bed/Wheelchair bound [ ]Edema  NEUROLOGIC:   [ ]No focal deficits  [ ] Cognitive impairment  [ ] Dysphagia [ ]Dysarthria [ ] Paresis [ ]Other   SKIN:   [ ]Normal   [ ]Pressure ulcer(s)  [ ]Rash    CRITICAL CARE:  [ ] Shock Present  [ ]Septic [ ]Cardiogenic [ ]Neurologic [ ]Hypovolemic  [ ]  Vasopressors [ ]  Inotropes   [ ] Respiratory failure present [ ] mechanical ventilation [ ] non-invasive ventilatory support [ ] High flow  [ ] Acute  [ ] Chronic [ ] Hypoxic  [ ] Hypercarbic [ ] Other  [ ] Other organ failure     LABS:  Complete Blood Count in AM (12.03.23 @ 06:20)    Nucleated RBC: 0 /100 WBCs   WBC Count: 26.40 K/uL   RBC Count: 3.86 M/uL   Hemoglobin: 12.4 g/dL   Hematocrit: 36.1 %   Mean Cell Volume: 93.5 fl   Mean Cell Hemoglobin: 32.1 pg   Mean Cell Hemoglobin Conc: 34.3 g/dL   Red Cell Distrib Width: 19.1 %   Platelet Count - Automated: 107: Test Repeated Specimen integrity verified. K/uL    Culture - Body Fluid with Gram Stain (12.04.23 @ 09:27)    Gram Stain:   polymorphonuclear leukocytes seen  No organisms seen  by cytocentrifuge   Specimen Source: Peritoneal Peritoneal Fluid   Culture Results:   No growth    Culture - Blood (11.30.23 @ 16:50)    Specimen Source: .Blood Blood-Peripheral   Culture Results:   No growth at 5 days    Culture - Urine (12.01.23 @ 17:00)    Specimen Source: Clean Catch Clean Catch (Midstream)   Culture Results:   <10,000 CFU/mL Normal Urogenital Giselle    RADIOLOGY & ADDITIONAL STUDIES:  < from: US Abdomen Limited (12.04.23 @ 09:05) >  IMPRESSION:  Large volume ascites.  --- End of Report ---  < end of copied text >    < from: CT Abdomen and Pelvis w/ IV Cont (11.30.23 @ 18:09) >  IMPRESSION:  1.  Hepatic metastatic disease.  2.  Wall-thickened small bowel and ascending/transverse colon suggests   enterocolitis. No bowel obstruction.  3.  Large volume ascites.  --- End of Report ---  < end of copied text >    < from: CT Chest No Cont (11.30.23 @ 18:08) >  IMPRESSION: There is no pneumonia.  Findings suggestive of bilateral pulmonary metastasis.  --- End of Report ---  < end of copied text >    < from: Xray Chest 1 View- PORTABLE-Urgent (11.30.23 @ 15:36) >  IMPRESSION: Right-sided port is new.  There are scattered infiltrates in the left mid lower lung at this time.  --- End of Report ---  < end of copied text >    PROTEIN CALORIE MALNUTRITION PRESENT: [ x] Yes [ ] No  [ ] PPSV2 < or = to 30% [ ] significant weight loss  [x ] poor nutritional intake [ x] catabolic state [ ] anasarca     Artificial Nutrition [ ]     REFERRALS:   [ ]Chaplaincy  [ ] Hospice  [ ]Child Life  [ ]Social Work  [ ]Case management [ ]Holistic Therapy     Goals of Care Document:   Care Coordination Assessment 201 [C. Provider] (12-04-23 @ 16:01)    HPI:  Veronica Napoles is a 63 year old female with PMHx of HTN, anxiety, COPD, esophagitis, and stage IV duodenal CA (s/p gastrojejunostomy in 3/2023, on chemotherapy since 4/2023) who presented to the ED on 11/30/23 for complaints of lethargy and hypotension.    History primarily obtained from daughter, Milagro at bedside. Daughter reports she changed patient into her pajamas this morning before she left for work around 9:45 AM. Patient was also up to the bathroom three times by herself. When daughter got home around 1 PM, patient was lethargic and described as incoherent. Blood pressure was low when daughter checked. Called EMS to bring to the ER for further evaluation. Of note, patient has been consuming soft pureed foods and ice cream with protein powder. Daughter is worried that patient has been steadily loosing weight and requested nutrition consultation.    Of note, patient started chemotherapy on April 2023 with FOLOFOX and CEA was being trended. Initially downtrended so oncologist stated treatment was working. Recently got bloodwork with  in November 2023 so plan is to switch treatment to irinotecan, leucovorin, 5-FU, and Mvasi immunotherapy on 12/7/23. Last chemotherapy session was on 11/1/23 and supposed to receive every two weeks. Also received Neupogen with chemotherapy infusion. However, given recurrent abdominal ascites, patient has required three therapeutic paracenteses within the past two weeks. This resulted in inability to attend chemotherapy session due to frequent reaccumulation of ascites.     In the ED, afebrile, HR as elevated as 104, BP as low as 86/56. WBC 17.95K, hgb 11.2, plts 105K, sodium 129, bicarb 21, BUN 76, Cr 1.85, albumin 2.0, alkphos 599, , ALT 51. Lactic acid 2.1. CT chest without PNA but with evidence of pulmonary metastases. CT A/P with hepatic metastases, wall-thickened  small bowel and ascending/transverse colon suggests enterocolitis. No bowel obstruction. Large volume ascites. Received 2L NS bolus and morphine 2 mg IV.  (30 Nov 2023 20:51)    PERTINENT PM/SXH:   Hypertension    Anxiety    Asthma    HLD (hyperlipidemia)    Adenocarcinoma of small intestine, stage 4    COPD without exacerbation    COPD (chronic obstructive pulmonary disease)      No significant past surgical history      FAMILY HISTORY:  FH: myocardial infarction  Mother, Father, and Brother      ITEMS NOT CHECKED ARE NOT PRESENT    SOCIAL HISTORY:   Significant other/partner: yes  [ ]  Children: yes [ ]  Anabaptist/Spirituality: Jehovah's witness  Substance hx:  [ ]   Tobacco hx:  [ ]   Alcohol hx: [ ]   Home Opioid hx:  [ ] I-Stop Reference No: Reference #: [id number]    Practitioner Count: 3  Pharmacy Count: 1  Current Opioid Prescriptions: 2  Current Benzodiazepine Prescriptions: 0  Current Stimulant Prescriptions: 0      Patient Demographic Information (PDI)       PDI	First Name	Last Name	Birth Date	Gender	Street Address	[facility]	Zip Code  ROBERTA Napoles	1960	Female	[address]	[place]	[address]    Prescription Information      PDI Filter:    PDI	My Rx	Current Rx	Drug Type	Rx Written	Rx Dispensed	Drug	Quantity	Days Supply	Prescriber Name	Prescriber ALYX #	Payment Method	Dispenser  A	N	Y	O	11/24/2023	11/25/2023	hydromorphone 4 mg tablet	60	15	Andrew Claudio	[id number]	Medicaid	[facility]  A	N	Y	O	11/17/2023	11/19/2023	morphine sulf er 15 mg tablet	60	30	Andrew Claudio	[id number]	Medicaid	[facility]  A	N	N	O	10/30/2023	10/30/2023	hydromorphone 4 mg tablet	60	15	Andrew Claudio	[id number]	Medicaid	[facility]  A	N	N	B	10/26/2023	10/28/2023	alprazolam 0.5 mg tablet	90	15	Vikki Zaragoza	[id number]	Medicaid	[facility]  A	N	N	O	10/09/2023	10/21/2023	morphine sulf er 15 mg tablet	60	30	Andrew Claudio	[id number]	Medicaid	[facility]  A	N	N	O	10/09/2023	10/10/2023	hydromorphone 4 mg tablet	60	20	Andrew Claudio	[id number]	Medicaid	[facility]  A	N	N	B	09/25/2023	09/26/2023	alprazolam 0.5 mg tablet	90	15	Renny Espinoza	[id number]	Medicaid	[facility]  A	N	N	O	09/12/2023	09/15/2023	morphine sulf er 15 mg tablet	60	30	Zanartu, Andrew	[id number]	Medicaid	[facility]  A	N	N	O	09/12/2023	09/12/2023	hydromorphone 2 mg tablet	60	20	Zanartu, Andrew	[id number]	Medicaid	[facility]  A	N	N	B	08/24/2023	08/28/2023	alprazolam 0.5 mg tablet	90	15	[id number]	Medicaid	[facility]  A	N	N	O	08/16/2023	08/17/2023	morphine sulf er 15 mg tablet	60	30	Zanartu, Andrew	[id number]	Medicaid	[facility]  A	N	N	O	08/03/2023	08/04/2023	hydromorphone 2 mg tablet	60	20	Zanartu, Andrew	[id number]	Medicaid	[facility]  A	N	N	B	07/28/2023	07/28/2023	alprazolam 0.5 mg tablet	90	15	Vikki Zaragoza	[id number]	Medicaid	[facility]  A	N	N	O	07/06/2023	07/12/2023	morphine sulf er 15 mg tablet	60	30	Zanartu, Andrew	[id number]	Medicaid	[facility]  A	N	N	O	07/06/2023	07/07/2023	hydromorphone 2 mg tablet	60	20	Zanartu, Andrew	[id number]	Medicaid	[facility]  A	N	N	B	06/27/2023	06/27/2023	alprazolam 0.5 mg tablet	90	15	RickyVikki davis	[id number]	Medicaid	[facility]  A	N	N	O	06/13/2023	06/13/2023	morphine sulf er 15 mg tablet	60	30	Zanartu, Andrew	[id number]	Medicaid	[facility]  A	N	N	O	06/06/2023	06/06/2023	hydromorphone 2 mg tablet	60	20	Zanartu, Andrew	[id number]	[facility]  A	N	N	B	05/25/2023	05/25/2023	alprazolam 0.5 mg tablet	90	15	Marianna Lopez [facility]	[id number]	Medicaid	[facility]  A	N	N	O	05/02/2023	05/02/2023	morphine sulf er 15 mg tablet	60	30	AminaAndrew berrios	[id number]	Medicaid	[facility]  A	N	N	O	05/02/2023	05/02/2023	hydromorphone 2 mg tablet	60	20	Andrew Claudio	[id number]	Medicaid	[facility]  A	N	N	B	04/10/2023	04/22/2023	alprazolam 0.5 mg tablet	90	15	Vikki Zaragoza	[id number]	Medicaid	[facility]  A	N	N	O	04/14/2023	04/18/2023	diphenoxylate-atropine 2.5-0.025 mg tablet	240	60	Stanley Mckee MD	[id number]	Medicaid	[facility]  A	N	N	B	04/14/2023	04/14/2023	lorazepam 0.5 mg tablet	90	30	Stanley Mckee MD	[id number]	Medicaid	[facility]  A	N	N	B	04/05/2023	04/07/2023	alprazolam 0.5 mg tablet	90	15	Vikki Zaragoza	[id number]	Medicaid	[facility]  A	N	N	O	03/14/2023	03/14/2023	fentanyl 25 mcg/hr patch	10	30	Andrew Claudio	[id number]	Medicaid	[facility]  A	N	N	B	03/13/2023	03/13/2023	alprazolam 0.5 mg tablet	90	15	Vikki Zaragoza	[id number]	Medicaid	[facility]  A	N	N	O	03/02/2023	03/02/2023	fentanyl 12 mcg/hr patch	10	30	Andrew Claudio	[id number]	Medicaid	[facility]  A	N	N	B	02/10/2023	02/11/2023	alprazolam 0.5 mg tablet	90	15	Vikki Zaragoza	[id number]	Medicaid	[facility]  A	N	N	B	01/18/2023	01/18/2023	alprazolam 0.5 mg tablet	90	15	Vikki Zaragoza	[id number]	Medicaid	[facility]  A	N	N	B	11/22/2022	12/08/2022	alprazolam 0.5 mg tablet	90	15	Vikki Zaragoza	[id number]	Medicaid	[id number]  Living Situation: [ ]Home  [ ]Long term care  [ ]Rehab [ ]Other    ADVANCE DIRECTIVES:    DNR  MOLST  [ ]  Living Will  [ ]   DECISION MAKER(s):  [ ] Health Care Proxy(s)  [ x] Surrogate(s)  [ ] Guardian           Name(s): Phone Number(s): Mino Parson [phone]    BASELINE (I)ADL(s) (prior to admission):  [place]: [ ]Total  [ x] Moderate [ ]Dependent    Allergies    No Known Allergies    Intolerances    MEDICATIONS  (STANDING):  chlorhexidine 2% Cloths 1 Application(s) Topical daily  heparin   Injectable 5000 Unit(s) SubCutaneous every 12 hours  montelukast 10 milliGRAM(s) Oral daily  pantoprazole    Tablet 40 milliGRAM(s) Oral before breakfast  polyethylene glycol 3350 17 Gram(s) Oral two times a day  QUEtiapine 50 milliGRAM(s) Oral at bedtime  senna 2 Tablet(s) Oral at bedtime  sertraline 50 milliGRAM(s) Oral daily  sucralfate 1 Gram(s) Oral <User Schedule>    MEDICATIONS  (PRN):  acetaminophen     Tablet .. 650 milliGRAM(s) Oral every 6 hours PRN Temp greater or equal to 38C (100.4F), Mild Pain (1 - 3)  albuterol    90 MICROgram(s) HFA Inhaler 2 Puff(s) Inhalation every 6 hours PRN Shortness of Breath and/or Wheezing  ALPRAZolam 0.5 milliGRAM(s) Oral three times a day PRN anxiety  HYDROmorphone   Tablet 4 milliGRAM(s) Oral every 6 hours PRN Moderate Pain (4 - 6)  melatonin 3 milliGRAM(s) Oral at bedtime PRN Insomnia  ondansetron Injectable 4 milliGRAM(s) IV Push every 6 hours PRN Nausea and/or Vomiting    PRESENT SYMPTOMS: [ ]Unable to obtain due to poor mentation   Source if other than patient:  [ ]Family   [ ]Team     Pain: [ ] yes [ ] no  QOL impact -   Location -                    Aggravating factors -  Quality -  Radiation -  Timing-  Severity (0-10 scale):  Minimal acceptable level (0-10 scale):     PAIN AD Score:     [email or web address]/[email or web address].pdf (press ctrl +  left click to view)    Dyspnea:                           [ ]Mild [ ]Moderate [ ]Severe  Anxiety:                             [ ]Mild [ ]Moderate [ ]Severe  Fatigue:                             [ ]Mild [ ]Moderate [ ]Severe  Nausea:                             [ ]Mild [ ]Moderate [ ]Severe  Loss of appetite:              [ ]Mild [ ]Moderate [ ]Severe  Constipation:                    [ ]Mild [ ]Moderate [ ]Severe    Other Symptoms:  [ ]All other review of systems negative     Karnofsky Performance Score/Palliative Performance Status Version 2:      20-30  %    [email or web address]_[email or web address]  PHYSICAL EXAM:  Vital Signs Last 24 Hrs  T(C): 36.4 (06 Dec 2023 11:17), Max: 36.6 (06 Dec 2023 04:51)  T(F): 97.6 (06 Dec 2023 11:17), Max: 97.9 (06 Dec 2023 04:51)  HR: 80 (06 Dec 2023 11:17) (80 - 103)  BP: 107/76 (06 Dec 2023 11:17) (102/75 - 127/93)  BP(mean): --  RR: 18 (06 Dec 2023 11:17) (18 - 18)  SpO2: 97% (06 Dec 2023 11:17) (95% - 98%)    Parameters below as of 05 Dec 2023 16:22  Patient On (Oxygen Delivery Method): room air     I&O's Summary    05 Dec 2023 07:01  -  06 Dec 2023 07:00  --------------------------------------------------------  IN: 240 mL / OUT: 0 mL / NET: 240 mL    GENERAL:  [ ]Alert  [ ]Oriented x   [x ]Lethargic  [x ]Cachexia  [ ]Unarousable  [ x]Verbal minimal, low speech volume, hard to hear [ ]Non-Verbal  Behavioral:   [ ] Anxiety  [ ] Delirium [ ] Agitation [ ] Other  HEENT:  [ ]Normal   [ ]Dry mouth   [ ]ET Tube/Trach  [ ]Oral lesions  PULMONARY: deferred  [ ]Clear [ ]Tachypnea  [ ]Audible excessive secretions   [ ]Rhonchi        [ ]Right [ ]Left [ ]Bilateral  [ ]Crackles        [ ]Right [ ]Left [ ]Bilateral  [ ]Wheezing     [ ]Right [ ]Left [ ]Bilateral  CARDIOVASCULAR:    [ ]Regular [ ]Irregular [ ]Tachy  [ ]Rashad [ ]Murmur [ ]Other  GASTROINTESTINAL: deferred palpation  [ ]Soft  [x ]Distended   [ ]+BS  [ ]Non tender [ ]Tender  [ ]PEG [ ]OGT/ NGT  Last BM: 12/5/23 GENITOURINARY:  [x ]Normal [ ] Incontinent   [ ]Oliguria/Anuria   [ ]Ramírez  MUSCULOSKELETAL:   [ ]Normal   [ x]Weakness  [ ]Bed/Wheelchair bound [ ]Edema  NEUROLOGIC:   [ ]No focal deficits  [ ] Cognitive impairment  [ ] Dysphagia [ ]Dysarthria [ ] Paresis [ ]Other   SKIN:   [ ]Normal   [ ]Pressure ulcer(s)  [ ]Rash    CRITICAL CARE:  [ ] Shock Present  [ ]Septic [ ]Cardiogenic [ ]Neurologic [ ]Hypovolemic  [ ]  Vasopressors [ ]  Inotropes   [ ] Respiratory failure present [ ] mechanical ventilation [ ] non-invasive ventilatory support [ ] High flow  [ ] Acute  [ ] Chronic [ ] Hypoxic  [ ] Hypercarbic [ ] Other  [ ] Other organ failure     LABS:  Complete Blood Count in AM (12.03.23 @ 06:20)    Nucleated RBC: 0 /100 WBCs   WBC Count: 26.40 K/uL   RBC Count: 3.86 M/uL   Hemoglobin: 12.4 g/dL   Hematocrit: 36.1 %   Mean Cell Volume: 93.5 fl   Mean Cell Hemoglobin: 32.1 pg   Mean Cell Hemoglobin Conc: 34.3 g/dL   Red Cell Distrib Width: 19.1 %   Platelet Count - Automated: 107: Test Repeated Specimen integrity verified. K/uL    Culture - Body Fluid with Gram Stain (12.04.23 @ 09:27)    Gram Stain:   polymorphonuclear leukocytes seen  No organisms seen  by cytocentrifuge   Specimen Source: Peritoneal Peritoneal Fluid   Culture Results:   No growth    Culture - Blood (11.30.23 @ 16:50)    Specimen Source: .Blood Blood-Peripheral   Culture Results:   No growth at 5 days    Culture - Urine (12.01.23 @ 17:00)    Specimen Source: Clean Catch Clean Catch (Midstream)   Culture Results:   <10,000 CFU/mL Normal Urogenital Giselle    RADIOLOGY & ADDITIONAL STUDIES:  < from: US Abdomen Limited (12.04.23 @ 09:05) >  IMPRESSION:  Large volume ascites.  --- End of Report ---  < end of copied text >    < from: CT Abdomen and Pelvis w/ IV Cont (11.30.23 @ 18:09) >  IMPRESSION:  1.  Hepatic metastatic disease.  2.  Wall-thickened small bowel and ascending/transverse colon suggests   enterocolitis. No bowel obstruction.  3.  Large volume ascites.  --- End of Report ---  < end of copied text >    < from: CT Chest No Cont (11.30.23 @ 18:08) >  IMPRESSION: There is no pneumonia.  Findings suggestive of bilateral pulmonary metastasis.  --- End of Report ---  < end of copied text >    < from: Xray Chest 1 View- PORTABLE-Urgent (11.30.23 @ 15:36) >  IMPRESSION: Right-sided port is new.  There are scattered infiltrates in the left mid lower lung at this time.  --- End of Report ---  < end of copied text >    PROTEIN CALORIE MALNUTRITION PRESENT: [ x] Yes [ ] No  [ ] PPSV2 < or = to 30% [ ] significant weight loss  [x ] poor nutritional intake [ x] catabolic state [ ] anasarca     Artificial Nutrition [ ]     REFERRALS:   [ ]Chaplaincy  [ ] Hospice  [ ]Child Life  [ ]Social Work  [ ]Case management [ ]Holistic Therapy     Goals of Care Document:   Care Coordination Assessment 201 [C. Provider] (12-04-23 @ 16:01)    HPI:  Veronica Napoles is a 63 year old female with PMHx of HTN, anxiety, COPD, esophagitis, and stage IV duodenal CA (s/p gastrojejunostomy in 3/2023, on chemotherapy since 4/2023) who presented to the ED on 11/30/23 for complaints of lethargy and hypotension.    History primarily obtained from daughter, Milagro at bedside. Daughter reports she changed patient into her pajamas this morning before she left for work around 9:45 AM. Patient was also up to the bathroom three times by herself. When daughter got home around 1 PM, patient was lethargic and described as incoherent. Blood pressure was low when daughter checked. Called EMS to bring to the ER for further evaluation. Of note, patient has been consuming soft pureed foods and ice cream with protein powder. Daughter is worried that patient has been steadily loosing weight and requested nutrition consultation.    Of note, patient started chemotherapy on April 2023 with FOLOFOX and CEA was being trended. Initially downtrended so oncologist stated treatment was working. Recently got bloodwork with  in November 2023 so plan is to switch treatment to irinotecan, leucovorin, 5-FU, and Mvasi immunotherapy on 12/7/23. Last chemotherapy session was on 11/1/23 and supposed to receive every two weeks. Also received Neupogen with chemotherapy infusion. However, given recurrent abdominal ascites, patient has required three therapeutic paracenteses within the past two weeks. This resulted in inability to attend chemotherapy session due to frequent reaccumulation of ascites.     In the ED, afebrile, HR as elevated as 104, BP as low as 86/56. WBC 17.95K, hgb 11.2, plts 105K, sodium 129, bicarb 21, BUN 76, Cr 1.85, albumin 2.0, alkphos 599, , ALT 51. Lactic acid 2.1. CT chest without PNA but with evidence of pulmonary metastases. CT A/P with hepatic metastases, wall-thickened  small bowel and ascending/transverse colon suggests enterocolitis. No bowel obstruction. Large volume ascites. Received 2L NS bolus and morphine 2 mg IV.  (30 Nov 2023 20:51)    PERTINENT PM/SXH:   Hypertension    Anxiety    Asthma    HLD (hyperlipidemia)    Adenocarcinoma of small intestine, stage 4    COPD without exacerbation    COPD (chronic obstructive pulmonary disease)      No significant past surgical history      FAMILY HISTORY:  FH: myocardial infarction  Mother, Father, and Brother      ITEMS NOT CHECKED ARE NOT PRESENT    SOCIAL HISTORY:   Significant other/partner: yes  [ ]  Children: yes [ ]  Islam/Spirituality: Advent  Substance hx:  [ ]   Tobacco hx:  [ ]   Alcohol hx: [ ]   Home Opioid hx:  [ ] I-Stop Reference No: Reference #: [id number]    Practitioner Count: 3  Pharmacy Count: 1  Current Opioid Prescriptions: 2  Current Benzodiazepine Prescriptions: 0  Current Stimulant Prescriptions: 0      Patient Demographic Information (PDI)       PDI	First Name	Last Name	Birth Date	Gender	Street Address	[facility]	Zip Code  ROBERTA Napoles	1960	Female	[address]	[place]	[address]    Prescription Information      PDI Filter:    PDI	My Rx	Current Rx	Drug Type	Rx Written	Rx Dispensed	Drug	Quantity	Days Supply	Prescriber Name	Prescriber ALYX #	Payment Method	Dispenser  A	N	Y	O	11/24/2023	11/25/2023	hydromorphone 4 mg tablet	60	15	Andrew Claudio	[id number]	Medicaid	[facility]  A	N	Y	O	11/17/2023	11/19/2023	morphine sulf er 15 mg tablet	60	30	Andrew Claudio	[id number]	Medicaid	[facility]  A	N	N	O	10/30/2023	10/30/2023	hydromorphone 4 mg tablet	60	15	Andrew Claudio	[id number]	Medicaid	[facility]  A	N	N	B	10/26/2023	10/28/2023	alprazolam 0.5 mg tablet	90	15	Vikki Zaragoza	[id number]	Medicaid	[facility]  A	N	N	O	10/09/2023	10/21/2023	morphine sulf er 15 mg tablet	60	30	Andrew Claudio	[id number]	Medicaid	[facility]  A	N	N	O	10/09/2023	10/10/2023	hydromorphone 4 mg tablet	60	20	Andrew Claudio	[id number]	Medicaid	[facility]  A	N	N	B	09/25/2023	09/26/2023	alprazolam 0.5 mg tablet	90	15	Renny Espinoza	[id number]	Medicaid	[facility]  A	N	N	O	09/12/2023	09/15/2023	morphine sulf er 15 mg tablet	60	30	Zanartu, Andrew	[id number]	Medicaid	[facility]  A	N	N	O	09/12/2023	09/12/2023	hydromorphone 2 mg tablet	60	20	Zanartu, Andrew	[id number]	Medicaid	[facility]  A	N	N	B	08/24/2023	08/28/2023	alprazolam 0.5 mg tablet	90	15	[id number]	Medicaid	[facility]  A	N	N	O	08/16/2023	08/17/2023	morphine sulf er 15 mg tablet	60	30	Zanartu, Andrew	[id number]	Medicaid	[facility]  A	N	N	O	08/03/2023	08/04/2023	hydromorphone 2 mg tablet	60	20	Zanartu, Andrew	[id number]	Medicaid	[facility]  A	N	N	B	07/28/2023	07/28/2023	alprazolam 0.5 mg tablet	90	15	Vikki Zaragoza	[id number]	Medicaid	[facility]  A	N	N	O	07/06/2023	07/12/2023	morphine sulf er 15 mg tablet	60	30	Zanartu, Andrew	[id number]	Medicaid	[facility]  A	N	N	O	07/06/2023	07/07/2023	hydromorphone 2 mg tablet	60	20	Zanartu, Andrew	[id number]	Medicaid	[facility]  A	N	N	B	06/27/2023	06/27/2023	alprazolam 0.5 mg tablet	90	15	RickyVikki davis	[id number]	Medicaid	[facility]  A	N	N	O	06/13/2023	06/13/2023	morphine sulf er 15 mg tablet	60	30	Zanartu, Andrew	[id number]	Medicaid	[facility]  A	N	N	O	06/06/2023	06/06/2023	hydromorphone 2 mg tablet	60	20	Zanartu, Andrew	[id number]	[facility]  A	N	N	B	05/25/2023	05/25/2023	alprazolam 0.5 mg tablet	90	15	Marianna Lopez [facility]	[id number]	Medicaid	[facility]  A	N	N	O	05/02/2023	05/02/2023	morphine sulf er 15 mg tablet	60	30	AminaAndrew berrios	[id number]	Medicaid	[facility]  A	N	N	O	05/02/2023	05/02/2023	hydromorphone 2 mg tablet	60	20	Andrew Claudio	[id number]	Medicaid	[facility]  A	N	N	B	04/10/2023	04/22/2023	alprazolam 0.5 mg tablet	90	15	Vikki Zaragoza	[id number]	Medicaid	[facility]  A	N	N	O	04/14/2023	04/18/2023	diphenoxylate-atropine 2.5-0.025 mg tablet	240	60	Stanley Mckee MD	[id number]	Medicaid	[facility]  A	N	N	B	04/14/2023	04/14/2023	lorazepam 0.5 mg tablet	90	30	Stanley Mckee MD	[id number]	Medicaid	[facility]  A	N	N	B	04/05/2023	04/07/2023	alprazolam 0.5 mg tablet	90	15	Vikki Zaragoza	[id number]	Medicaid	[facility]  A	N	N	O	03/14/2023	03/14/2023	fentanyl 25 mcg/hr patch	10	30	Andrew Claudio	[id number]	Medicaid	[facility]  A	N	N	B	03/13/2023	03/13/2023	alprazolam 0.5 mg tablet	90	15	Vikki Zaragoza	[id number]	Medicaid	[facility]  A	N	N	O	03/02/2023	03/02/2023	fentanyl 12 mcg/hr patch	10	30	Andrew Claudio	[id number]	Medicaid	[id number]  A	N	N	B	02/10/2023	02/11/2023	alprazolam 0.5 mg tablet	90	15	Vikki Zaragoza	[id number]	Medicaid	[id number]  A	N	N	B	01/18/2023	01/18/2023	alprazolam 0.5 mg tablet	90	15	Vikki Zaragoza	[id number]	Medicaid	[id number]  A	N	N	B	11/22/2022	12/08/2022	alprazolam 0.5 mg tablet	90	15	Vikki Zaragoza	[id number]	Medicaid	[id number]  Living Situation: [ ]Home  [ ]Long term care  [ ]Rehab [ ]Other    ADVANCE DIRECTIVES:    DNR  MOLST  [ ]  Living Will  [ ]   DECISION MAKER(s):  [ ] Health Care Proxy(s)  [ x] Surrogate(s)  [ ] Guardian           Name(s): Phone Number(s): Mino Parson [phone]    BASELINE (I)ADL(s) (prior to admission):  [place]: [ ]Total  [ x] Moderate [ ]Dependent    Allergies    No Known Allergies    Intolerances    MEDICATIONS  (STANDING):  chlorhexidine 2% Cloths 1 Application(s) Topical daily  heparin   Injectable 5000 Unit(s) SubCutaneous every 12 hours  montelukast 10 milliGRAM(s) Oral daily  pantoprazole    Tablet 40 milliGRAM(s) Oral before breakfast  polyethylene glycol 3350 17 Gram(s) Oral two times a day  QUEtiapine 50 milliGRAM(s) Oral at bedtime  senna 2 Tablet(s) Oral at bedtime  sertraline 50 milliGRAM(s) Oral daily  sucralfate 1 Gram(s) Oral <User Schedule>    MEDICATIONS  (PRN):  acetaminophen     Tablet .. 650 milliGRAM(s) Oral every 6 hours PRN Temp greater or equal to 38C (100.4F), Mild Pain (1 - 3)  albuterol    90 MICROgram(s) HFA Inhaler 2 Puff(s) Inhalation every 6 hours PRN Shortness of Breath and/or Wheezing  ALPRAZolam 0.5 milliGRAM(s) Oral three times a day PRN anxiety  HYDROmorphone   Tablet 4 milliGRAM(s) Oral every 6 hours PRN Moderate Pain (4 - 6)  melatonin 3 milliGRAM(s) Oral at bedtime PRN Insomnia  ondansetron Injectable 4 milliGRAM(s) IV Push every 6 hours PRN Nausea and/or Vomiting    PRESENT SYMPTOMS: [ ]Unable to obtain due to poor mentation   Source if other than patient:  [ ]Family   [ ]Team     Pain: [ ] yes [ ] no  QOL impact -   Location -                    Aggravating factors -  Quality -  Radiation -  Timing-  Severity (0-10 scale):  Minimal acceptable level (0-10 scale):     PAIN AD Score:     http://geriatrictoolkit.[facility]/cog/painad.pdf (press ctrl +  left click to view)    Dyspnea:                           [ ]Mild [ ]Moderate [ ]Severe  Anxiety:                             [ ]Mild [ ]Moderate [ ]Severe  Fatigue:                             [ ]Mild [ ]Moderate [ ]Severe  Nausea:                             [ ]Mild [ ]Moderate [ ]Severe  Loss of appetite:              [ ]Mild [ ]Moderate [ ]Severe  Constipation:                    [ ]Mild [ ]Moderate [ ]Severe    Other Symptoms:  [ ]All other review of systems negative     Karnofsky Performance Score/Palliative Performance Status Version 2:      20-30  %    http://[facility].org/files/news/palliative_performance_scale_ppsv2.pdf  PHYSICAL EXAM:  Vital Signs Last 24 Hrs  T(C): 36.4 (06 Dec 2023 11:17), Max: 36.6 (06 Dec 2023 04:51)  T(F): 97.6 (06 Dec 2023 11:17), Max: 97.9 (06 Dec 2023 04:51)  HR: 80 (06 Dec 2023 11:17) (80 - 103)  BP: 107/76 (06 Dec 2023 11:17) (102/75 - 127/93)  BP(mean): --  RR: 18 (06 Dec 2023 11:17) (18 - 18)  SpO2: 97% (06 Dec 2023 11:17) (95% - 98%)    Parameters below as of 05 Dec 2023 16:22  Patient On (Oxygen Delivery Method): room air     I&O's Summary    05 Dec 2023 07:01  -  06 Dec 2023 07:00  --------------------------------------------------------  IN: 240 mL / OUT: 0 mL / NET: 240 mL    GENERAL:  [ ]Alert  [ ]Oriented x   [x ]Lethargic  [x ]Cachexia  [ ]Unarousable  [ x]Verbal minimal, low speech volume, hard to hear [ ]Non-Verbal  Behavioral:   [ ] Anxiety  [ ] Delirium [ ] Agitation [ ] Other  HEENT:  [ ]Normal   [ ]Dry mouth   [ ]ET Tube/Trach  [ ]Oral lesions  PULMONARY: deferred  [ ]Clear [ ]Tachypnea  [ ]Audible excessive secretions   [ ]Rhonchi        [ ]Right [ ]Left [ ]Bilateral  [ ]Crackles        [ ]Right [ ]Left [ ]Bilateral  [ ]Wheezing     [ ]Right [ ]Left [ ]Bilateral  CARDIOVASCULAR:    [ ]Regular [ ]Irregular [ ]Tachy  [ ]Rashad [ ]Murmur [ ]Other  GASTROINTESTINAL: deferred palpation  [ ]Soft  [x ]Distended   [ ]+BS  [ ]Non tender [ ]Tender  [ ]PEG [ ]OGT/ NGT  Last BM: 12/5/23 GENITOURINARY:  [x ]Normal [ ] Incontinent   [ ]Oliguria/Anuria   [ ]Ramírez  MUSCULOSKELETAL:   [ ]Normal   [ x]Weakness  [ ]Bed/Wheelchair bound [ ]Edema  NEUROLOGIC:   [ ]No focal deficits  [ ] Cognitive impairment  [ ] Dysphagia [ ]Dysarthria [ ] Paresis [ ]Other   SKIN:   [ ]Normal   [ ]Pressure ulcer(s)  [ ]Rash    CRITICAL CARE:  [ ] Shock Present  [ ]Septic [ ]Cardiogenic [ ]Neurologic [ ]Hypovolemic  [ ]  Vasopressors [ ]  Inotropes   [ ] Respiratory failure present [ ] mechanical ventilation [ ] non-invasive ventilatory support [ ] High flow  [ ] Acute  [ ] Chronic [ ] Hypoxic  [ ] Hypercarbic [ ] Other  [ ] Other organ failure     LABS:  Complete Blood Count in AM (12.03.23 @ 06:20)    Nucleated RBC: 0 /100 WBCs   WBC Count: 26.40 K/uL   RBC Count: 3.86 M/uL   Hemoglobin: 12.4 g/dL   Hematocrit: 36.1 %   Mean Cell Volume: 93.5 fl   Mean Cell Hemoglobin: 32.1 pg   Mean Cell Hemoglobin Conc: 34.3 g/dL   Red Cell Distrib Width: 19.1 %   Platelet Count - Automated: 107: Test Repeated Specimen integrity verified. K/uL    Culture - Body Fluid with Gram Stain (12.04.23 @ 09:27)    Gram Stain:   polymorphonuclear leukocytes seen  No organisms seen  by cytocentrifuge   Specimen Source: Peritoneal Peritoneal Fluid   Culture Results:   No growth    Culture - Blood (11.30.23 @ 16:50)    Specimen Source: .Blood Blood-Peripheral   Culture Results:   No growth at 5 days    Culture - Urine (12.01.23 @ 17:00)    Specimen Source: Clean Catch Clean Catch (Midstream)   Culture Results:   <10,000 CFU/mL Normal Urogenital Giselle    RADIOLOGY & ADDITIONAL STUDIES:  < from: US Abdomen Limited (12.04.23 @ 09:05) >  IMPRESSION:  Large volume ascites.  --- End of Report ---  < end of copied text >    < from: CT Abdomen and Pelvis w/ IV Cont (11.30.23 @ 18:09) >  IMPRESSION:  1.  Hepatic metastatic disease.  2.  Wall-thickened small bowel and ascending/transverse colon suggests   enterocolitis. No bowel obstruction.  3.  Large volume ascites.  --- End of Report ---  < end of copied text >    < from: CT Chest No Cont (11.30.23 @ 18:08) >  IMPRESSION: There is no pneumonia.  Findings suggestive of bilateral pulmonary metastasis.  --- End of Report ---  < end of copied text >    < from: Xray Chest 1 View- PORTABLE-Urgent (11.30.23 @ 15:36) >  IMPRESSION: Right-sided port is new.  There are scattered infiltrates in the left mid lower lung at this time.  --- End of Report ---  < end of copied text >    PROTEIN CALORIE MALNUTRITION PRESENT: [ x] Yes [ ] No  [ ] PPSV2 < or = to 30% [ ] significant weight loss  [x ] poor nutritional intake [ x] catabolic state [ ] anasarca     Artificial Nutrition [ ]     REFERRALS:   [ ]Chaplaincy  [ ] Hospice  [ ]Child Life  [ ]Social Work  [ ]Case management [ ]Holistic Therapy     Goals of Care Document:   Care Coordination Assessment 201 [C. Provider] (12-04-23 @ 16:01)    HPI:  Veronica Napoles is a 63 year old female with PMHx of HTN, anxiety, COPD, esophagitis, and stage IV duodenal CA (s/p gastrojejunostomy in 3/2023, on chemotherapy since 4/2023) who presented to the ED on 11/30/23 for complaints of lethargy and hypotension.    History primarily obtained from daughter, Milagro at bedside. Daughter reports she changed patient into her pajamas this morning before she left for work around 9:45 AM. Patient was also up to the bathroom three times by herself. When daughter got home around 1 PM, patient was lethargic and described as incoherent. Blood pressure was low when daughter checked. Called EMS to bring to the ER for further evaluation. Of note, patient has been consuming soft pureed foods and ice cream with protein powder. Daughter is worried that patient has been steadily loosing weight and requested nutrition consultation.    Of note, patient started chemotherapy on April 2023 with FOLOFOX and CEA was being trended. Initially downtrended so oncologist stated treatment was working. Recently got bloodwork with  in November 2023 so plan is to switch treatment to irinotecan, leucovorin, 5-FU, and Mvasi immunotherapy on 12/7/23. Last chemotherapy session was on 11/1/23 and supposed to receive every two weeks. Also received Neupogen with chemotherapy infusion. However, given recurrent abdominal ascites, patient has required three therapeutic paracenteses within the past two weeks. This resulted in inability to attend chemotherapy session due to frequent reaccumulation of ascites.     In the ED, afebrile, HR as elevated as 104, BP as low as 86/56. WBC 17.95K, hgb 11.2, plts 105K, sodium 129, bicarb 21, BUN 76, Cr 1.85, albumin 2.0, alkphos 599, , ALT 51. Lactic acid 2.1. CT chest without PNA but with evidence of pulmonary metastases. CT A/P with hepatic metastases, wall-thickened  small bowel and ascending/transverse colon suggests enterocolitis. No bowel obstruction. Large volume ascites. Received 2L NS bolus and morphine 2 mg IV.  (30 Nov 2023 20:51)    PERTINENT PM/SXH:   Hypertension    Anxiety    Asthma    HLD (hyperlipidemia)    Adenocarcinoma of small intestine, stage 4    COPD without exacerbation    COPD (chronic obstructive pulmonary disease)      No significant past surgical history      FAMILY HISTORY:  FH: myocardial infarction  Mother, Father, and Brother      ITEMS NOT CHECKED ARE NOT PRESENT    SOCIAL HISTORY:   Significant other/partner: yes  [ ]  Children: yes [ ]  Baptism/Spirituality: Restoration  Substance hx:  [ ]   Tobacco hx:  [ ]   Alcohol hx: [ ]   Home Opioid hx:  [ ] I-Stop Reference No: Reference #: [id number]    Practitioner Count: 3  Pharmacy Count: 1  Current Opioid Prescriptions: 2  Current Benzodiazepine Prescriptions: 0  Current Stimulant Prescriptions: 0      Patient Demographic Information (PDI)       PDI	First Name	Last Name	Birth Date	Gender	Street Address	[facility]	Zip Code  ROBERTA Napoles	1960	Female	[address]	[place]	[address]    Prescription Information      PDI Filter:    PDI	My Rx	Current Rx	Drug Type	Rx Written	Rx Dispensed	Drug	Quantity	Days Supply	Prescriber Name	Prescriber ALYX #	Payment Method	Dispenser  A	N	Y	O	11/24/2023	11/25/2023	hydromorphone 4 mg tablet	60	15	Andrew Claudio	[id number]	Medicaid	[id number]  A	N	Y	O	11/17/2023	11/19/2023	morphine sulf er 15 mg tablet	60	30	Andrew Claudio	[id number]	Medicaid	[facility]  A	N	N	O	10/30/2023	10/30/2023	hydromorphone 4 mg tablet	60	15	Andrew Claudio	[id number]	Medicaid	[facility]  A	N	N	B	10/26/2023	10/28/2023	alprazolam 0.5 mg tablet	90	15	Vikki Zaragoza	[id number]	Medicaid	[facility]  A	N	N	O	10/09/2023	10/21/2023	morphine sulf er 15 mg tablet	60	30	Anrdew Claudio	[id number]	Medicaid	[facility]  A	N	N	O	10/09/2023	10/10/2023	hydromorphone 4 mg tablet	60	20	Andrew Claudio	[id number]	Medicaid	[facility]  A	N	N	B	09/25/2023	09/26/2023	alprazolam 0.5 mg tablet	90	15	Renny Espinoza	[id number]	Medicaid	[facility]  A	N	N	O	09/12/2023	09/15/2023	morphine sulf er 15 mg tablet	60	30	Zanartu, Andrew	[id number]	Medicaid	[facility]  A	N	N	O	09/12/2023	09/12/2023	hydromorphone 2 mg tablet	60	20	Zanartu, Andrew	[id number]	Medicaid	[facility]  A	N	N	B	08/24/2023	08/28/2023	alprazolam 0.5 mg tablet	90	15	[id number]	Medicaid	[facility]  A	N	N	O	08/16/2023	08/17/2023	morphine sulf er 15 mg tablet	60	30	Zanartu, Andrew	[id number]	Medicaid	[facility]  A	N	N	O	08/03/2023	08/04/2023	hydromorphone 2 mg tablet	60	20	Zanartu, Andrew	[id number]	Medicaid	[facility]  A	N	N	B	07/28/2023	07/28/2023	alprazolam 0.5 mg tablet	90	15	Vikki Zaragoza	[id number]	Medicaid	[facility]  A	N	N	O	07/06/2023	07/12/2023	morphine sulf er 15 mg tablet	60	30	Zanartu, Andrew	[id number]	Medicaid	[facility]  A	N	N	O	07/06/2023	07/07/2023	hydromorphone 2 mg tablet	60	20	Zanartu, Andrew	[id number]	Medicaid	[facility]  A	N	N	B	06/27/2023	06/27/2023	alprazolam 0.5 mg tablet	90	15	RickyVikki davis	[id number]	Medicaid	[facility]  A	N	N	O	06/13/2023	06/13/2023	morphine sulf er 15 mg tablet	60	30	Zanartu, Andrew	[id number]	Medicaid	[facility]  A	N	N	O	06/06/2023	06/06/2023	hydromorphone 2 mg tablet	60	20	Zanartu, Andrew	[id number]	[facility]  A	N	N	B	05/25/2023	05/25/2023	alprazolam 0.5 mg tablet	90	15	Marianna Lopez [facility]	[id number]	Medicaid	[facility]  A	N	N	O	05/02/2023	05/02/2023	morphine sulf er 15 mg tablet	60	30	AminaAndrew berrios	[id number]	Medicaid	[facility]  A	N	N	O	05/02/2023	05/02/2023	hydromorphone 2 mg tablet	60	20	Andrew Claudio	[id number]	Medicaid	[facility]  A	N	N	B	04/10/2023	04/22/2023	alprazolam 0.5 mg tablet	90	15	Vikki Zaragoza	[id number]	Medicaid	[facility]  A	N	N	O	04/14/2023	04/18/2023	diphenoxylate-atropine 2.5-0.025 mg tablet	240	60	Stanley Mckee MD	[id number]	Medicaid	[facility]  A	N	N	B	04/14/2023	04/14/2023	lorazepam 0.5 mg tablet	90	30	Stanley Mckee MD	[id number]	Medicaid	[facility]  A	N	N	B	04/05/2023	04/07/2023	alprazolam 0.5 mg tablet	90	15	Vikki Zaragoza	[id number]	Medicaid	[facility]  A	N	N	O	03/14/2023	03/14/2023	fentanyl 25 mcg/hr patch	10	30	Andrew Claudio	[id number]	Medicaid	[facility]  A	N	N	B	03/13/2023	03/13/2023	alprazolam 0.5 mg tablet	90	15	Vikki Zaragoza	[id number]	Medicaid	[facility]  A	N	N	O	03/02/2023	03/02/2023	fentanyl 12 mcg/hr patch	10	30	Andrew Claudio	[id number]	Medicaid	[facility]  A	N	N	B	02/10/2023	02/11/2023	alprazolam 0.5 mg tablet	90	15	Vikki Zaragoza	[id number]	Medicaid	[facility]  A	N	N	B	01/18/2023	01/18/2023	alprazolam 0.5 mg tablet	90	15	Vikki Zaragoza	[id number]	Medicaid	[facility]  A	N	N	B	11/22/2022	12/08/2022	alprazolam 0.5 mg tablet	90	15	Vikki Zaragoza	[id number]	Medicaid	[facility]  Living Situation: [ ]Home  [ ]Long term care  [ ]Rehab [ ]Other    ADVANCE DIRECTIVES:    DNR  MOLST  [ ]  Living Will  [ ]   DECISION MAKER(s):  [ ] Health Care Proxy(s)  [ x] Surrogate(s)  [ ] Guardian           Name(s): Phone Number(s): Milagro [phone]/Mino Parson [phone]    BASELINE (I)ADL(s) (prior to admission):  [place]: [ ]Total  [ x] Moderate [ ]Dependent    Allergies    No Known Allergies    Intolerances    MEDICATIONS  (STANDING):  chlorhexidine 2% Cloths 1 Application(s) Topical daily  heparin   Injectable 5000 Unit(s) SubCutaneous every 12 hours  montelukast 10 milliGRAM(s) Oral daily  pantoprazole    Tablet 40 milliGRAM(s) Oral before breakfast  polyethylene glycol 3350 17 Gram(s) Oral two times a day  QUEtiapine 50 milliGRAM(s) Oral at bedtime  senna 2 Tablet(s) Oral at bedtime  sertraline 50 milliGRAM(s) Oral daily  sucralfate 1 Gram(s) Oral <User Schedule>    MEDICATIONS  (PRN):  acetaminophen     Tablet .. 650 milliGRAM(s) Oral every 6 hours PRN Temp greater or equal to 38C (100.4F), Mild Pain (1 - 3)  albuterol    90 MICROgram(s) HFA Inhaler 2 Puff(s) Inhalation every 6 hours PRN Shortness of Breath and/or Wheezing  ALPRAZolam 0.5 milliGRAM(s) Oral three times a day PRN anxiety  HYDROmorphone   Tablet 4 milliGRAM(s) Oral every 6 hours PRN Moderate Pain (4 - 6)  melatonin 3 milliGRAM(s) Oral at bedtime PRN Insomnia  ondansetron Injectable 4 milliGRAM(s) IV Push every 6 hours PRN Nausea and/or Vomiting    PRESENT SYMPTOMS: [ ]Unable to obtain due to poor mentation   Source if other than patient:  [ ]Family   [ ]Team     Pain: [ ] yes [ ] no  QOL impact -   Location -                    Aggravating factors -  Quality -  Radiation -  Timing-  Severity (0-10 scale):  Minimal acceptable level (0-10 scale):     PAIN AD Score:     http://geriatrictoolkit.[facility]/cog/painad.pdf (press ctrl +  left click to view)    Dyspnea:                           [ ]Mild [ ]Moderate [ ]Severe  Anxiety:                             [ ]Mild [ ]Moderate [ ]Severe  Fatigue:                             [ ]Mild [ ]Moderate [ ]Severe  Nausea:                             [ ]Mild [ ]Moderate [ ]Severe  Loss of appetite:              [ ]Mild [ ]Moderate [ ]Severe  Constipation:                    [ ]Mild [ ]Moderate [ ]Severe    Other Symptoms:  [ ]All other review of systems negative     Karnofsky Performance Score/Palliative Performance Status Version 2:      20-30  %    [facility]://[facility]org/files/news/palliative_performance_scale_ppsv2.pdf  PHYSICAL EXAM:  Vital Signs Last 24 Hrs  T(C): 36.4 (06 Dec 2023 11:17), Max: 36.6 (06 Dec 2023 04:51)  T(F): 97.6 (06 Dec 2023 11:17), Max: 97.9 (06 Dec 2023 04:51)  HR: 80 (06 Dec 2023 11:17) (80 - 103)  BP: 107/76 (06 Dec 2023 11:17) (102/75 - 127/93)  BP(mean): --  RR: 18 (06 Dec 2023 11:17) (18 - 18)  SpO2: 97% (06 Dec 2023 11:17) (95% - 98%)    Parameters below as of 05 Dec 2023 16:22  Patient On (Oxygen Delivery Method): room air     I&O's Summary    05 Dec 2023 07:01  -  06 Dec 2023 07:00  --------------------------------------------------------  IN: 240 mL / OUT: 0 mL / NET: 240 mL    GENERAL:  [ ]Alert  [ ]Oriented x   [x ]Lethargic  [x ]Cachexia  [ ]Unarousable  [ x]Verbal minimal, low speech volume, hard to hear [ ]Non-Verbal  Behavioral:   [ ] Anxiety  [ ] Delirium [ ] Agitation [ ] Other  HEENT:  [ ]Normal   [ ]Dry mouth   [ ]ET Tube/Trach  [ ]Oral lesions  PULMONARY: deferred  [ ]Clear [ ]Tachypnea  [ ]Audible excessive secretions   [ ]Rhonchi        [ ]Right [ ]Left [ ]Bilateral  [ ]Crackles        [ ]Right [ ]Left [ ]Bilateral  [ ]Wheezing     [ ]Right [ ]Left [ ]Bilateral  CARDIOVASCULAR:    [ ]Regular [ ]Irregular [ ]Tachy  [ ]Rashad [ ]Murmur [ ]Other  GASTROINTESTINAL: deferred palpation  [ ]Soft  [x ]Distended   [ ]+BS  [ ]Non tender [ ]Tender  [ ]PEG [ ]OGT/ NGT  Last BM: 12/5/23 GENITOURINARY:  [x ]Normal [ ] Incontinent   [ ]Oliguria/Anuria   [ ]Ramírez  MUSCULOSKELETAL:   [ ]Normal   [ x]Weakness  [ ]Bed/Wheelchair bound [ ]Edema  NEUROLOGIC:   [ ]No focal deficits  [ ] Cognitive impairment  [ ] Dysphagia [ ]Dysarthria [ ] Paresis [ ]Other   SKIN:   [ ]Normal   [ ]Pressure ulcer(s)  [ ]Rash    CRITICAL CARE:  [ ] Shock Present  [ ]Septic [ ]Cardiogenic [ ]Neurologic [ ]Hypovolemic  [ ]  Vasopressors [ ]  Inotropes   [ ] Respiratory failure present [ ] mechanical ventilation [ ] non-invasive ventilatory support [ ] High flow  [ ] Acute  [ ] Chronic [ ] Hypoxic  [ ] Hypercarbic [ ] Other  [ ] Other organ failure     LABS:  Complete Blood Count in AM (12.03.23 @ 06:20)    Nucleated RBC: 0 /100 WBCs   WBC Count: 26.40 K/uL   RBC Count: 3.86 M/uL   Hemoglobin: 12.4 g/dL   Hematocrit: 36.1 %   Mean Cell Volume: 93.5 fl   Mean Cell Hemoglobin: 32.1 pg   Mean Cell Hemoglobin Conc: 34.3 g/dL   Red Cell Distrib Width: 19.1 %   Platelet Count - Automated: 107: Test Repeated Specimen integrity verified. K/uL    Culture - Body Fluid with Gram Stain (12.04.23 @ 09:27)    Gram Stain:   polymorphonuclear leukocytes seen  No organisms seen  by cytocentrifuge   Specimen Source: Peritoneal Peritoneal Fluid   Culture Results:   No growth    Culture - Blood (11.30.23 @ 16:50)    Specimen Source: .Blood Blood-Peripheral   Culture Results:   No growth at 5 days    Culture - Urine (12.01.23 @ 17:00)    Specimen Source: Clean Catch Clean Catch (Midstream)   Culture Results:   <10,000 CFU/mL Normal Urogenital Giselle    RADIOLOGY & ADDITIONAL STUDIES:  < from: US Abdomen Limited (12.04.23 @ 09:05) >  IMPRESSION:  Large volume ascites.  --- End of Report ---  < end of copied text >    < from: CT Abdomen and Pelvis w/ IV Cont (11.30.23 @ 18:09) >  IMPRESSION:  1.  Hepatic metastatic disease.  2.  Wall-thickened small bowel and ascending/transverse colon suggests   enterocolitis. No bowel obstruction.  3.  Large volume ascites.  --- End of Report ---  < end of copied text >    < from: CT Chest No Cont (11.30.23 @ 18:08) >  IMPRESSION: There is no pneumonia.  Findings suggestive of bilateral pulmonary metastasis.  --- End of Report ---  < end of copied text >    < from: Xray Chest 1 View- PORTABLE-Urgent (11.30.23 @ 15:36) >  IMPRESSION: Right-sided port is new.  There are scattered infiltrates in the left mid lower lung at this time.  --- End of Report ---  < end of copied text >    PROTEIN CALORIE MALNUTRITION PRESENT: [ x] Yes [ ] No  [ ] PPSV2 < or = to 30% [ ] significant weight loss  [x ] poor nutritional intake [ x] catabolic state [ ] anasarca     Artificial Nutrition [ ]     REFERRALS:   [ ]Chaplaincy  [ ] Hospice  [ ]Child Life  [ ]Social Work  [ ]Case management [ ]Holistic Therapy     Goals of Care Document:   Care Coordination Assessment 201 [C. Provider] (12-04-23 @ 16:01)    HPI:  Veronica Napoles is a 63 year old female with PMHx of HTN, anxiety, COPD, esophagitis, and stage IV duodenal CA (s/p gastrojejunostomy in 3/2023, on chemotherapy since 4/2023) who presented to the ED on 11/30/23 for complaints of lethargy and hypotension.    History primarily obtained from daughter, Milagro at bedside. Daughter reports she changed patient into her pajamas this morning before she left for work around 9:45 AM. Patient was also up to the bathroom three times by herself. When daughter got home around 1 PM, patient was lethargic and described as incoherent. Blood pressure was low when daughter checked. Called EMS to bring to the ER for further evaluation. Of note, patient has been consuming soft pureed foods and ice cream with protein powder. Daughter is worried that patient has been steadily loosing weight and requested nutrition consultation.    Of note, patient started chemotherapy on April 2023 with FOLOFOX and CEA was being trended. Initially downtrended so oncologist stated treatment was working. Recently got bloodwork with  in November 2023 so plan is to switch treatment to irinotecan, leucovorin, 5-FU, and Mvasi immunotherapy on 12/7/23. Last chemotherapy session was on 11/1/23 and supposed to receive every two weeks. Also received Neupogen with chemotherapy infusion. However, given recurrent abdominal ascites, patient has required three therapeutic paracenteses within the past two weeks. This resulted in inability to attend chemotherapy session due to frequent reaccumulation of ascites.     In the ED, afebrile, HR as elevated as 104, BP as low as 86/56. WBC 17.95K, hgb 11.2, plts 105K, sodium 129, bicarb 21, BUN 76, Cr 1.85, albumin 2.0, alkphos 599, , ALT 51. Lactic acid 2.1. CT chest without PNA but with evidence of pulmonary metastases. CT A/P with hepatic metastases, wall-thickened  small bowel and ascending/transverse colon suggests enterocolitis. No bowel obstruction. Large volume ascites. Received 2L NS bolus and morphine 2 mg IV.  (30 Nov 2023 20:51)    PERTINENT PM/SXH:   Hypertension    Anxiety    Asthma    HLD (hyperlipidemia)    Adenocarcinoma of small intestine, stage 4    COPD without exacerbation    COPD (chronic obstructive pulmonary disease)      No significant past surgical history      FAMILY HISTORY:  FH: myocardial infarction  Mother, Father, and Brother      ITEMS NOT CHECKED ARE NOT PRESENT    SOCIAL HISTORY:   Significant other/partner: yes  [ ]  Children: yes [ ]  Holiness/Spirituality: Faith  Substance hx:  [ ]   Tobacco hx:  [ ]   Alcohol hx: [ ]   Home Opioid hx:  [ ] I-Stop Reference No: Reference #: [id number]    Practitioner Count: 3  Pharmacy Count: 1  Current Opioid Prescriptions: 2  Current Benzodiazepine Prescriptions: 0  Current Stimulant Prescriptions: 0      Patient Demographic Information (PDI)       PDI	First Name	Last Name	Birth Date	Gender	Street Address	[facility]	Zip Code  ROBERTA Napoles	1960	Female	[address]	[place]	[address]    Prescription Information      PDI Filter:    PDI	My Rx	Current Rx	Drug Type	Rx Written	Rx Dispensed	Drug	Quantity	Days Supply	Prescriber Name	Prescriber ALYX #	Payment Method	Dispenser  A	N	Y	O	11/24/2023	11/25/2023	hydromorphone 4 mg tablet	60	15	Andrew Claudio	[id number]	Medicaid	[facility]  A	N	Y	O	11/17/2023	11/19/2023	morphine sulf er 15 mg tablet	60	30	Andrew Claudio	[id number]	Medicaid	[facility]  A	N	N	O	10/30/2023	10/30/2023	hydromorphone 4 mg tablet	60	15	Andrew Claudio	[id number]	Medicaid	[facility]  A	N	N	B	10/26/2023	10/28/2023	alprazolam 0.5 mg tablet	90	15	Vikki Zaragoza	[id number]	Medicaid	[facility]  A	N	N	O	10/09/2023	10/21/2023	morphine sulf er 15 mg tablet	60	30	Andrew Claudio	[id number]	Medicaid	[facility]  A	N	N	O	10/09/2023	10/10/2023	hydromorphone 4 mg tablet	60	20	Andrew Claudio	[id number]	Medicaid	[facility]  A	N	N	B	09/25/2023	09/26/2023	alprazolam 0.5 mg tablet	90	15	Renny Espinoza	[id number]	Medicaid	[facility]  A	N	N	O	09/12/2023	09/15/2023	morphine sulf er 15 mg tablet	60	30	Zanartu, Andrew	[id number]	Medicaid	[facility]  A	N	N	O	09/12/2023	09/12/2023	hydromorphone 2 mg tablet	60	20	Zanartu, Andrew	[id number]	Medicaid	[facility]  A	N	N	B	08/24/2023	08/28/2023	alprazolam 0.5 mg tablet	90	15	[id number]	Medicaid	[facility]  A	N	N	O	08/16/2023	08/17/2023	morphine sulf er 15 mg tablet	60	30	Zanartu, Andrew	[id number]	Medicaid	[id number]  A	N	N	O	08/03/2023	08/04/2023	hydromorphone 2 mg tablet	60	20	Zanartu, Andrew	[id number]	Medicaid	[id number]  A	N	N	B	07/28/2023	07/28/2023	alprazolam 0.5 mg tablet	90	15	Vikki Zaragoza	[id number]	Medicaid	[id number]  A	N	N	O	07/06/2023	07/12/2023	morphine sulf er 15 mg tablet	60	30	Zanartu, Andrew	[id number]	Medicaid	[id number]  A	N	N	O	07/06/2023	07/07/2023	hydromorphone 2 mg tablet	60	20	Zanartu, Andrew	[id number]	Medicaid	[id number]  A	N	N	B	06/27/2023	06/27/2023	alprazolam 0.5 mg tablet	90	15	RickyVikki davis	[id number]	Medicaid	[id number]  A	N	N	O	06/13/2023	06/13/2023	morphine sulf er 15 mg tablet	60	30	Zanartu, Andrew	[id number]	Medicaid	[id number]  A	N	N	O	06/06/2023	06/06/2023	hydromorphone 2 mg tablet	60	20	Zanartu, Andrew	[id number]	[id number]  A	N	N	B	05/25/2023	05/25/2023	alprazolam 0.5 mg tablet	90	15	Marianna Lopez [facility]	[id number]	Medicaid	[id number]  A	N	N	O	05/02/2023	05/02/2023	morphine sulf er 15 mg tablet	60	30	AminaAndrew berrios	[id number]	Medicaid	[id number]  A	N	N	O	05/02/2023	05/02/2023	hydromorphone 2 mg tablet	60	20	Andrew Claudio	[id number]	Medicaid	[id number]  A	N	N	B	04/10/2023	04/22/2023	alprazolam 0.5 mg tablet	90	15	Vikki Zaragoza	[id number]	Medicaid	[id number]  A	N	N	O	04/14/2023	04/18/2023	diphenoxylate-atropine 2.5-0.025 mg tablet	240	60	Stanley Mckee MD	[id number]	Medicaid	[id number]  A	N	N	B	04/14/2023	04/14/2023	lorazepam 0.5 mg tablet	90	30	Stanley Mckee MD	[id number]	Medicaid	[facility]  A	N	N	B	04/05/2023	04/07/2023	alprazolam 0.5 mg tablet	90	15	Vikki Zaragoza	[id number]	Medicaid	[id number]  A	N	N	O	03/14/2023	03/14/2023	fentanyl 25 mcg/hr patch	10	30	Andrew Claudio	[id number]	Medicaid	[id number]  A	N	N	B	03/13/2023	03/13/2023	alprazolam 0.5 mg tablet	90	15	Vikki Zaragoza	[id number]	Medicaid	[id number]  A	N	N	O	03/02/2023	03/02/2023	fentanyl 12 mcg/hr patch	10	30	Andrew Claudio	[id number]	Medicaid	[id number]  A	N	N	B	02/10/2023	02/11/2023	alprazolam 0.5 mg tablet	90	15	Vikki Zaragoza	[id number]	Medicaid	[id number]  A	N	N	B	01/18/2023	01/18/2023	alprazolam 0.5 mg tablet	90	15	Vikki Zaragoza	[id number]	Medicaid	[facility]  A	N	N	B	11/22/2022	12/08/2022	alprazolam 0.5 mg tablet	90	15	Vikki Zaragoza	[id number]	Medicaid	[id number]  Living Situation: [ ]Home  [ ]Long term care  [ ]Rehab [ ]Other    ADVANCE DIRECTIVES:    DNR  MOLST  [ ]  Living Will  [ ]   DECISION MAKER(s):  [ ] Health Care Proxy(s)  [ x] Surrogate(s)  [ ] Guardian           Name(s): Phone Number(s): Milagro [phone]/Mino Parson [phone]    BASELINE (I)ADL(s) (prior to admission):  [place]: [ ]Total  [ x] Moderate [ ]Dependent    Allergies    No Known Allergies    Intolerances    MEDICATIONS  (STANDING):  chlorhexidine 2% Cloths 1 Application(s) Topical daily  heparin   Injectable 5000 Unit(s) SubCutaneous every 12 hours  montelukast 10 milliGRAM(s) Oral daily  pantoprazole    Tablet 40 milliGRAM(s) Oral before breakfast  polyethylene glycol 3350 17 Gram(s) Oral two times a day  QUEtiapine 50 milliGRAM(s) Oral at bedtime  senna 2 Tablet(s) Oral at bedtime  sertraline 50 milliGRAM(s) Oral daily  sucralfate 1 Gram(s) Oral <User Schedule>    MEDICATIONS  (PRN):  acetaminophen     Tablet .. 650 milliGRAM(s) Oral every 6 hours PRN Temp greater or equal to 38C (100.4F), Mild Pain (1 - 3)  albuterol    90 MICROgram(s) HFA Inhaler 2 Puff(s) Inhalation every 6 hours PRN Shortness of Breath and/or Wheezing  ALPRAZolam 0.5 milliGRAM(s) Oral three times a day PRN anxiety  HYDROmorphone   Tablet 4 milliGRAM(s) Oral every 6 hours PRN Moderate Pain (4 - 6)  melatonin 3 milliGRAM(s) Oral at bedtime PRN Insomnia  ondansetron Injectable 4 milliGRAM(s) IV Push every 6 hours PRN Nausea and/or Vomiting    PRESENT SYMPTOMS: [ ]Unable to obtain due to poor mentation   Source if other than patient:  [ ]Family   [ ]Team     Pain: [ ] yes [ ] no  QOL impact -   Location -                    Aggravating factors -  Quality -  Radiation -  Timing-  Severity (0-10 scale):  Minimal acceptable level (0-10 scale):     PAIN AD Score:     http://[email or web address]/cog/painad.pdf (press ctrl +  left click to view)    Dyspnea:                           [ ]Mild [ ]Moderate [ ]Severe  Anxiety:                             [ ]Mild [ ]Moderate [ ]Severe  Fatigue:                             [ ]Mild [ ]Moderate [ ]Severe  Nausea:                             [ ]Mild [ ]Moderate [ ]Severe  Loss of appetite:              [ ]Mild [ ]Moderate [ ]Severe  Constipation:                    [ ]Mild [ ]Moderate [ ]Severe    Other Symptoms:  [ ]All other review of systems negative     Karnofsky Performance Score/Palliative Performance Status Version 2:      20-30  %    http://[email or web address].org/files/news/[email or web address]_performance_scale_[email or web address].pdf  PHYSICAL EXAM:  Vital Signs Last 24 Hrs  T(C): 36.4 (06 Dec 2023 11:17), Max: 36.6 (06 Dec 2023 04:51)  T(F): 97.6 (06 Dec 2023 11:17), Max: 97.9 (06 Dec 2023 04:51)  HR: 80 (06 Dec 2023 11:17) (80 - 103)  BP: 107/76 (06 Dec 2023 11:17) (102/75 - 127/93)  BP(mean): --  RR: 18 (06 Dec 2023 11:17) (18 - 18)  SpO2: 97% (06 Dec 2023 11:17) (95% - 98%)    Parameters below as of 05 Dec 2023 16:22  Patient On (Oxygen Delivery Method): room air     I&O's Summary    05 Dec 2023 07:01  -  06 Dec 2023 07:00  --------------------------------------------------------  IN: 240 mL / OUT: 0 mL / NET: 240 mL    GENERAL:  [ ]Alert  [ ]Oriented x   [x ]Lethargic  [x ]Cachexia  [ ]Unarousable  [ x]Verbal minimal, low speech volume, hard to hear [ ]Non-Verbal  Behavioral:   [ ] Anxiety  [ ] Delirium [ ] Agitation [ ] Other  HEENT:  [ ]Normal   [ ]Dry mouth   [ ]ET Tube/Trach  [ ]Oral lesions  PULMONARY: deferred  [ ]Clear [ ]Tachypnea  [ ]Audible excessive secretions   [ ]Rhonchi        [ ]Right [ ]Left [ ]Bilateral  [ ]Crackles        [ ]Right [ ]Left [ ]Bilateral  [ ]Wheezing     [ ]Right [ ]Left [ ]Bilateral  CARDIOVASCULAR:    [ ]Regular [ ]Irregular [ ]Tachy  [ ]Rashad [ ]Murmur [ ]Other  GASTROINTESTINAL: deferred palpation  [ ]Soft  [x ]Distended   [ ]+BS  [ ]Non tender [ ]Tender  [ ]PEG [ ]OGT/ NGT  Last BM: 12/5/23 GENITOURINARY:  [x ]Normal [ ] Incontinent   [ ]Oliguria/Anuria   [ ]Ramírez  MUSCULOSKELETAL:   [ ]Normal   [ x]Weakness  [ ]Bed/Wheelchair bound [ ]Edema  NEUROLOGIC:   [ ]No focal deficits  [ ] Cognitive impairment  [ ] Dysphagia [ ]Dysarthria [ ] Paresis [ ]Other   SKIN:   [ ]Normal   [ ]Pressure ulcer(s)  [ ]Rash    CRITICAL CARE:  [ ] Shock Present  [ ]Septic [ ]Cardiogenic [ ]Neurologic [ ]Hypovolemic  [ ]  Vasopressors [ ]  Inotropes   [ ] Respiratory failure present [ ] mechanical ventilation [ ] non-invasive ventilatory support [ ] High flow  [ ] Acute  [ ] Chronic [ ] Hypoxic  [ ] Hypercarbic [ ] Other  [ ] Other organ failure     LABS:  Complete Blood Count in AM (12.03.23 @ 06:20)    Nucleated RBC: 0 /100 WBCs   WBC Count: 26.40 K/uL   RBC Count: 3.86 M/uL   Hemoglobin: 12.4 g/dL   Hematocrit: 36.1 %   Mean Cell Volume: 93.5 fl   Mean Cell Hemoglobin: 32.1 pg   Mean Cell Hemoglobin Conc: 34.3 g/dL   Red Cell Distrib Width: 19.1 %   Platelet Count - Automated: 107: Test Repeated Specimen integrity verified. K/uL    Culture - Body Fluid with Gram Stain (12.04.23 @ 09:27)    Gram Stain:   polymorphonuclear leukocytes seen  No organisms seen  by cytocentrifuge   Specimen Source: Peritoneal Peritoneal Fluid   Culture Results:   No growth    Culture - Blood (11.30.23 @ 16:50)    Specimen Source: .Blood Blood-Peripheral   Culture Results:   No growth at 5 days    Culture - Urine (12.01.23 @ 17:00)    Specimen Source: Clean Catch Clean Catch (Midstream)   Culture Results:   <10,000 CFU/mL Normal Urogenital Giselle    RADIOLOGY & ADDITIONAL STUDIES:  < from: US Abdomen Limited (12.04.23 @ 09:05) >  IMPRESSION:  Large volume ascites.  --- End of Report ---  < end of copied text >    < from: CT Abdomen and Pelvis w/ IV Cont (11.30.23 @ 18:09) >  IMPRESSION:  1.  Hepatic metastatic disease.  2.  Wall-thickened small bowel and ascending/transverse colon suggests   enterocolitis. No bowel obstruction.  3.  Large volume ascites.  --- End of Report ---  < end of copied text >    < from: CT Chest No Cont (11.30.23 @ 18:08) >  IMPRESSION: There is no pneumonia.  Findings suggestive of bilateral pulmonary metastasis.  --- End of Report ---  < end of copied text >    < from: Xray Chest 1 View- PORTABLE-Urgent (11.30.23 @ 15:36) >  IMPRESSION: Right-sided port is new.  There are scattered infiltrates in the left mid lower lung at this time.  --- End of Report ---  < end of copied text >    PROTEIN CALORIE MALNUTRITION PRESENT: [ x] Yes [ ] No  [ ] PPSV2 < or = to 30% [ ] significant weight loss  [x ] poor nutritional intake [ x] catabolic state [ ] anasarca     Artificial Nutrition [ ]     REFERRALS:   [ ]Chaplaincy  [ ] Hospice  [ ]Child Life  [ ]Social Work  [ ]Case management [ ]Holistic Therapy     Goals of Care Document:   Care Coordination Assessment 201 [C. Provider] (12-04-23 @ 16:01)

## 2023-12-06 NOTE — CONSULT NOTE ADULT - CONVERSATION DETAILS
Spoke with patient's daughter Milagro at bedside as patient had finally fallen asleep and they did not want to wake her.  Patient was living at home with daughter, Milagro and her .  She was previously independent with all ADLs, walking around, said she was blowing her hair out.  She said over the past 2 weeks, she declined, has been eating very poorly.  She said patient recently had mucositis which resolved and believes her issues are related to her most recent chemo (5FU).  She asked about ways in which we could optimize her nutrition.  Inquired about appetite stimulants such as megace and explained that is not indicated and harm would likely exceed benefit as it can increase risk for clot development and she is already hypercoagulable from her cancer and laying in bed so her risk of clot development is high.  Further, NGT is only intended for a short course and there could be issues with malabsorption, diarrhea, aspiration and would recommend continued oral supplements and diet as tolerated.  She said patient had indicated she would want to go home, but she would need additional supports such as a hospital bed and needs help caring for her mother.  Explained patient may not eat well and we may not be able to improve her nutrition, and should she be weak and eating poorly, could explore home with hospice to get DME and support and if she got stronger, could then follow up with her oncologist.  She said she would need more help, explained that HHA would not be arranged from the hospital and she could reach out to patient's insurer to find out how they may be able to get HHA, but that process would not be quick and they can't evaluate her for HHA while in the hospital.  Discussed option of privately hiring HHA and she requested a list of HHA list for private hire.

## 2023-12-06 NOTE — PROGRESS NOTE ADULT - ASSESSMENT
63y Female w/ history of Stage IV duodenal cancer w/ mets, Anxiety, HTN & COPD presented w/ recurrent Abdominal ascites, elevated creatinine on CKD?, sepsis, possibly secondary to enterocolitis.      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 Stanley Mckee cell  (spoke with Dr. Escobedo and will reach out to family)  - oncology note read and appreciated     HCV positive  - CT showed liver steatosis w/ mets    Enterocolitis POA  - likely viral or due to chemo meds  - CT showed gastrojejunostomy w/ all-thickened small bowel and ascending/transverse colon suggests enterocolitis  - patient doing well off antibiotics   - blood cxs negative     COPD  - c/w Singulair & Proventil     Elevated Cr  - likely CKD III    Hyponatremia  - likely chronic hypervolemic hyponatremia due to ascites     Anxiety  - c/w Zoloft & Seroquel     Esophagitis on EGD in 3/2023  - c/w sucralfate & pantoprazole    Constipation  - c/w Miralax & Senna    Prophylaxis:  DVT: Heparin  GI: Protonix  63y Female w/ history of Stage IV duodenal cancer w/ mets, Anxiety, HTN & COPD presented w/ recurrent Abdominal ascites, elevated creatinine on CKD?, sepsis, possibly secondary to enterocolitis.      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 Stanley Mckee cell  (spoke with Dr. Escobedo and will reach out to family)  - oncology note read and appreciated     HCV positive  - CT showed liver steatosis w/ mets    Enterocolitis POA  - likely viral or due to chemo meds  - CT showed gastrojejunostomy w/ all-thickened small bowel and ascending/transverse colon suggests enterocolitis  - patient doing well off antibiotics   - blood cxs negative     COPD  - c/w Singulair & Proventil     Elevated Cr  - likely CKD III    Hyponatremia  - likely chronic hypervolemic hyponatremia due to ascites     Anxiety  - c/w Zoloft & Seroquel     Esophagitis on EGD in 3/2023  - c/w sucralfate & pantoprazole    Constipation  - c/w Miralax & Senna    Prophylaxis:  DVT: Heparin  GI: Protonix

## 2023-12-06 NOTE — CONSULT NOTE ADULT - ASSESSMENT
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 Patient has had recurrent ascites with 3 paracentesis in 2 weeks prior to admission, was tapped on 12/4 with 4.7L removed.  Patient with poor po intake.  Palliative care consulted for GOC.

## 2023-12-06 NOTE — CONSULT NOTE ADULT - PROBLEM SELECTOR RECOMMENDATION 3
Clinical evidence indicates that the patient has Severe protein calorie malnutrition/ 3rd degree    In context of Chronic Illness (>1 month)    Energy/Food intake <50% of estimated energy requirement >5 days  Weight loss: Moderate -36 pounds in 8 months  Body Fat loss: Severe   (Cachexia, temporal wasting, contracted, muscle atrophy)  Muscle mass loss: Severe  (Skin failure/pressure ulcers)  Fluid Accumulation: Severe (Fluid overload, ascites, pleural effusions)   Strength: weakened     Recommend: Clinical evidence indicates that the patient has Severe protein calorie malnutrition/ 3rd degree    In context of Chronic Illness (>1 month)    Energy/Food intake <50% of estimated energy requirement >5 days  Weight loss: Moderate -36 pounds in 8 months  Body Fat loss: Severe   (Cachexia, temporal wasting, contracted, muscle atrophy)  Muscle mass loss: Severe  (Skin failure/pressure ulcers)  Fluid Accumulation: Severe (Fluid overload, ascites, pleural effusions)   Strength: weakened     Recommend:  trial of zinc to see if dysgeusia improves, encourage po intake and supplements as tolerated, discussed with dieticians gets JanetCompleteSet supplement 2 times per day, discussed appetite stimulants do not appear appropriate at this time, enteral feeding tubes more likely to cause more harm than benefit including risk for aspiration, malabsorption, diarrhea, etc. Clinical evidence indicates that the patient has Severe protein calorie malnutrition/ 3rd degree    In context of Chronic Illness (>1 month)    Energy/Food intake <50% of estimated energy requirement >5 days  Weight loss: Moderate -36 pounds in 8 months  Body Fat loss: Severe   (Cachexia, temporal wasting, contracted, muscle atrophy)  Muscle mass loss: Severe  (Skin failure/pressure ulcers)  Fluid Accumulation: Severe (Fluid overload, ascites, pleural effusions)   Strength: weakened     Recommend:  trial of zinc to see if dysgeusia improves, encourage po intake and supplements as tolerated, discussed with dieticians gets JanetecoVent supplement 2 times per day, discussed appetite stimulants do not appear appropriate at this time, enteral feeding tubes more likely to cause more harm than benefit including risk for aspiration, malabsorption, diarrhea, etc. no new labs, likely pre-renal in setting of poor po intake  avoid nephrotoxic agents

## 2023-12-06 NOTE — CONSULT NOTE ADULT - NS ATTEND AMEND GEN_ALL_CORE FT
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 Patient has had recurrent ascites with 3 paracentesis in 2 weeks prior to admission, was tapped on 12/4 with 4.7L removed.  Patient with poor po intake. ECOG 4. Continued clinical decline, appears most appropriate for hospice at this time, not a current candidate for further DMT, family not ready to consider. Palliative will follow for ongoing GOC discussions pending clinical course. 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 Patient has had recurrent ascites with 3 paracentesis in 2 weeks prior to admission, was tapped on 12/4 with 4.7L removed.  Patient with poor po intake. ECOG 4. Continued clinical decline, appears most appropriate for hospice at this time, not a current candidate for further DMT, family not ready to consider. Pain control. Palliative will follow for ongoing GOC discussions pending clinical course.

## 2023-12-06 NOTE — CONSULT NOTE ADULT - PROBLEM SELECTOR RECOMMENDATION 4
assistance with care  PT recommending [facility], family said patient has indicated she wants to go home Clinical evidence indicates that the patient has Severe protein calorie malnutrition/ 3rd degree    In context of Chronic Illness (>1 month)    Energy/Food intake <50% of estimated energy requirement >5 days  Weight loss: Moderate -36 pounds in 8 months  Body Fat loss: Severe   (Cachexia, temporal wasting, contracted, muscle atrophy)  Muscle mass loss: Severe  (Skin failure/pressure ulcers)  Fluid Accumulation: Severe (Fluid overload, ascites, pleural effusions)   Strength: weakened     Recommend:  trial of zinc to see if dysgeusia improves, encourage po intake and supplements as tolerated, discussed with dieticians gets JanetFurious supplement 2 times per day, discussed appetite stimulants do not appear appropriate at this time, enteral feeding tubes more likely to cause more harm than benefit including risk for aspiration, malabsorption, diarrhea, etc. Clinical evidence indicates that the patient has Severe protein calorie malnutrition/ 3rd degree    In context of Chronic Illness (>1 month)    Energy/Food intake <50% of estimated energy requirement >5 days  Weight loss: Moderate -36 pounds in 8 months  Body Fat loss: Severe   (Cachexia, temporal wasting, contracted, muscle atrophy)  Muscle mass loss: Severe  (Skin failure/pressure ulcers)  Fluid Accumulation: Severe (Fluid overload, ascites, pleural effusions)   Strength: weakened     Recommend:  trial of zinc to see if dysgeusia improves, encourage po intake and supplements as tolerated, discussed with dieticians gets JanetWindowsWear supplement 2 times per day, discussed appetite stimulants do not appear appropriate at this time, enteral feeding tubes more likely to cause more harm than benefit including risk for aspiration, malabsorption, diarrhea, etc.

## 2023-12-06 NOTE — CONSULT NOTE ADULT - PROBLEM SELECTOR RECOMMENDATION 5
See GOC above.  Patient does appear most appropriate for hospice at this time.  Family concerned about optimizing nutrition status and discussed it may be difficult to.  Explored the idea of home with hospice to provide additional resources such as DME should her condition remain unchanged or she declines further.  Dieticians to follow up with family.  Received phone call from patient's daughter asking about discharge options including hospice.  Palliative to follow.    Discussed with Dr. Ziegler and dietician LIVIA Francis assistance with care  PT recommending [facility], family said patient has indicated she wants to go home

## 2023-12-06 NOTE — CONSULT NOTE ADULT - PROBLEM SELECTOR RECOMMENDATION 2
no new labs, likely pre-renal in setting of poor po intake  avoid nephrotoxic agents was diagnosed in Jan 2023, follows with Dr. Mckee for oncology, per chart notes had chemo beginning of Nov of this year   hepatic mets CT chest with findings suggestive of pulmonary mets  recurrent ascites, has had 3 paracentesis in 2 weeks prior to admission, [facility] cultures with no growth  poor po intake  ECOG 3-4  hospice eligible if within C was diagnosed in Jan 2023, follows with Dr. Mckee for oncology, per chart notes had chemo beginning of Nov of this year   hepatic mets CT chest with findings suggestive of pulmonary mets  recurrent ascites, has had 3 paracentesis in 2 weeks prior to admission, [facility], cultures with no growth  poor po intake  ECOG 3-4  hospice eligible if within C

## 2023-12-06 NOTE — CONSULT NOTE ADULT - PROBLEM SELECTOR RECOMMENDATION 6
See GOC above.  Patient does appear most appropriate for hospice at this time.  Family concerned about optimizing nutrition status and discussed it may be difficult to.  Explored the idea of home with hospice to provide additional resources such as DME should her condition remain unchanged or she declines further.  Dieticians to follow up with family.  Received phone call from patient's daughter asking about discharge options including hospice.  Palliative to follow.    Discussed with Dr. Ziegler and dietician LIVIA Francis

## 2023-12-06 NOTE — CONSULT NOTE ADULT - PROBLEM SELECTOR RECOMMENDATION 9
was diagnosed in Jan 2023, follows with Dr. Mckee for oncology, per chart notes had chemo beginning of Nov of this year   hepatic mets CT chest with findings suggestive of pulmonary mets  recurrent ascites, has had 3 paracentesis in 2 weeks prior to admission, [facility], cultures with no growth  ECOG was diagnosed in Jan 2023, follows with Dr. Mckee for oncology, per chart notes had chemo beginning of Nov of this year   hepatic mets CT chest with findings suggestive of pulmonary mets  recurrent ascites, has had 3 paracentesis in 2 weeks prior to admission, [facility], cultures with no growth  poor po intake  ECOG 3-4  hospice eligible if within C was diagnosed in Jan 2023, follows with Dr. Mckee for oncology, per chart notes had chemo beginning of Nov of this year   hepatic mets CT chest with findings suggestive of pulmonary mets  recurrent ascites, has had 3 paracentesis in 2 weeks prior to admission, [facility], cultures with no growth  poor po intake  ECOG 3-4  hospice eligible if within C metastatic small bowel cancer, CT also suggestive of enterocolitis  Dilaudid po 4mg PRN  recurrent ascites

## 2023-12-07 NOTE — PROVIDER CONTACT NOTE (OTHER) - BACKGROUND
63y F hx of adenocarcinoma of small intestine with mets to liver, admitted for lethargy, LUDA and hypotension. Pt made DNR/DNI and comfort care today.

## 2023-12-07 NOTE — PROGRESS NOTE ADULT - ASSESSMENT
63y Female w/ history of Stage IV duodenal cancer w/ mets, Anxiety, HTN & COPD presented w/ recurrent Abdominal ascites, elevated creatinine on CKD?, sepsis, possibly secondary to enterocolitis.      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 Stanley Mckee cell  (spoke with Dr. Escobedo and will reach out to family)  - oncology note read and appreciated     HCV positive  - CT showed liver steatosis w/ mets    Enterocolitis POA  - likely viral or due to chemo meds  - CT showed gastrojejunostomy w/ all-thickened small bowel and ascending/transverse colon suggests enterocolitis  - patient doing well off antibiotics   - blood cxs negative     COPD  - c/w Singulair & Proventil     Elevated Cr  - likely CKD III    Hyponatremia  - likely chronic hypervolemic hyponatremia due to ascites     Anxiety  - Zoloft & Seroquel on hold as patient is not swallowing     Esophagitis on EGD in 3/2023  - hold sucralfate & pantoprazole as patient is not swallowing     Constipation  - c/w PRN Dulcolax    Prophylaxis:  DVT: Heparin  GI: Protonix       Family meeting on 12/7 held w/ palliative PA, patient's , Mino and daughter, Milagro. They have decided to put the patient on hospice care and do not want NGT and IV blood draws. Pt has been made DNR/DNI. Palliative PA has added Dilaudid and Ativan. 63y Female w/ history of Stage IV duodenal cancer w/ mets, Anxiety, HTN & COPD presented w/ recurrent Abdominal ascites, elevated creatinine on CKD?, sepsis, possibly secondary to enterocolitis.      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 Stanley Mckee cell  (spoke with Dr. Escobedo and will reach out to family)  - oncology note read and appreciated  - I called and spoke w/ Rajinder Aragon and Gabriel who both agreed that nothing can be done for the patient at this time from an oncology treatment standpoint and noted that there was no benefit in transferring patient to [facility]    HCV positive  - CT showed liver steatosis w/ mets    Enterocolitis POA  - likely viral or due to chemo meds  - CT showed gastrojejunostomy w/ all-thickened small bowel and ascending/transverse colon suggests enterocolitis  - patient doing well off antibiotics   - blood cxs negative     COPD  - c/w Singulair & Proventil     Elevated Cr  - likely CKD III    Hyponatremia  - likely chronic hypervolemic hyponatremia due to ascites     Anxiety  - Zoloft & Seroquel on hold as patient is not swallowing     Esophagitis on EGD in 3/2023  - hold sucralfate & pantoprazole as patient is not swallowing     Constipation  - c/w PRN Dulcolax    Prophylaxis:  DVT: Heparin  GI: Protonix       Family meeting on 12/7 held w/ palliative PA, patient's , Mino and daughter, Milagro. They have decided to put the patient on hospice care and do not want NGT and IV blood draws. Pt has been made DNR/DNI. Palliative PA has added Dilaudid and Ativan. 63y Female w/ history of Stage IV duodenal cancer w/ mets, Anxiety, HTN & COPD presented w/ recurrent Abdominal ascites, elevated creatinine on CKD?, sepsis, possibly secondary to enterocolitis.      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 Stanley Mckee cell  (spoke with Dr. Escobedo and will reach out to family)  - oncology note read and appreciated  - I called and spoke w/ Rajinder Aragon and Gabriel who both agreed that nothing can be done for the patient at this time from an oncology treatment standpoint and noted that there was no benefit in transferring patient to [facility]    HCV positive  - CT showed liver steatosis w/ mets    Enterocolitis POA  - likely viral or due to chemo meds  - CT showed gastrojejunostomy w/ all-thickened small bowel and ascending/transverse colon suggests enterocolitis  - patient doing well off antibiotics   - blood cxs negative     COPD  - c/w Singulair & Proventil     Elevated Cr  - likely CKD III    Hyponatremia  - likely chronic hypervolemic hyponatremia due to ascites     Anxiety  - Zoloft & Seroquel on hold as patient is not swallowing     Esophagitis on EGD in 3/2023  - hold sucralfate & pantoprazole as patient is not swallowing     Constipation  - c/w PRN Dulcolax    Prophylaxis:  DVT: Heparin  GI: Protonix       Family meeting on 12/7 held w/ palliative PA, patient's , Mino and daughter, Milagro. They have decided to put the patient on hospice care and do not want NGT and IV blood draws. Pt has been made DNR/DNI. Palliative PA has added Dilaudid and Ativan.

## 2023-12-07 NOTE — PROGRESS NOTE ADULT - CONVERSATION DETAILS
Spoke with patient's , Mino and daughter, Milagro along with Dr. Ziegler.  Dr. Ziegler addressed current issues including inability to swallow and continued poor po intake.  Family has expressed they wish to optimize patient's nutrition in the hopes that would get her stronger.  Discussed patient does appear to be in the dying process and any sort of feeding tube would not be indicated as it would be more burdensome than beneficial at this point.  Explained that patient appears to be appropriate for inpatient hospice.  Family agreeable to referrals being placed for inpatient hospice.  Discussed Both Mino and Milagro said they do not want patient to suffer.  Discussed symptom management with IV medication as patient is unable to swallow which they were in agreement with.  Further agree with no further labs and comfort measures.  Discussed IVF and we can continue but will drop the rate, but at some point the IVF may become burdensome than beneficial and may need to stop the IVF.  Discussed interventions such as CPR and intubation would also do more harm and not change outcome as patient has advanced metastatic disease and appears to be in the dying process.  Family agrees they want her to pass peacefully and MOLST completed indicating DNR/I and comfort measures. Spoke with patient's , Mino and daughter, Milagro along with Dr. Ziegler.  Dr. Ziegler addressed current issues including inability to swallow and continued poor po intake.  Family has expressed they wish to optimize patient's nutrition in the hopes that would get her stronger.  Discussed patient does appear to be in the dying process and any sort of feeding tube would not be indicated as it would be more burdensome than beneficial at this point.  Explained that patient appears to be appropriate for inpatient hospice.  Family agreeable to referrals being placed for inpatient hospice.  Both Mino and Milagro do not want patient to suffer.  Discussed symptom management with IV medication as patient is unable to swallow which they were in agreement with.  Further agree with no further labs and comfort measures.  Discussed IVF and we can continue but will drop the rate, but at some point the IVF may become burdensome than beneficial and may need to stop the IVF.  Discussed interventions such as CPR and intubation would also do more harm and not change outcome as patient has advanced metastatic disease and appears to be in the dying process.  Family agrees they want her to pass peacefully and MOLST completed indicating DNR/I and comfort measures.

## 2023-12-07 NOTE — PROGRESS NOTE ADULT - PROBLEM SELECTOR PLAN 2
was diagnosed in Jan 2023, follows with Dr. Mckee for oncology, per chart notes had chemo beginning of Nov of this year   hepatic mets CT chest with findings suggestive of pulmonary mets  recurrent ascites, has had 3 paracentesis in 2 weeks prior to admission, [facility], cultures with no growth  no longer swallowing or taking po, appears most appropriate for inpatient hospice, referrals placed   ECOG 4 was diagnosed in Jan 2023, follows with Dr. Mckee for oncology, per chart notes had chemo beginning of Nov of this year   hepatic mets CT chest with findings suggestive of pulmonary mets  recurrent ascites, has had 3 paracentesis in 2 weeks prior to admission, [facility], cultures with no growth  no longer swallowing or taking po, appears most appropriate for inpatient hospice, referrals placed   ECOG 4

## 2023-12-07 NOTE — CHART NOTE - NSCHARTNOTEFT_GEN_A_CORE
As per RN pt has difficulty swallowing her PO meds and is requesting Hydromorphone for pain.    PLAN:  1. Hydromorphone 2 mg, IVP, Q 4 hrs, PRN for severe pain ordered x 24 hours

## 2023-12-07 NOTE — CONSULT NOTE ADULT - SUBJECTIVE AND OBJECTIVE BOX
63F with hx metastatic duodenal cancer admitted with ascites and sepsis.   GI consulted last night for enterocolitis seen on CT and to see if anything could be done to optimize nutritional status.   Is now comfort care, awaiting hospice bed.   Hx obtained from daughter at bedside.  Patient is having bm, not diarrhea.    PMH/PSH--  Hypertension  Anxiety  Asthma  HLD (hyperlipidemia)  Adenocarcinoma of small intestine, stage 4  COPD without exacerbation  Gastrojejunostomy    Allergies--  No Known Allergies    Medications--  Other: acetaminophen  Suppository .. PRN  albuterol    90 MICROgram(s) HFA Inhaler PRN  bisacodyl Suppository PRN  chlorhexidine 2% Cloths  glycopyrrolate Injectable PRN  HYDROmorphone  Injectable  HYDROmorphone  Injectable PRN  LORazepam   Injectable PRN  montelukast  ondansetron Injectable PRN  QUEtiapine  sodium chloride 0.9%    Social History--  EtOH: denies   Tobacco: denies   Drug Use: denies     Family/Marital History--    FH: myocardial infarction    Review of Systems: unable to obtain due to patient mental status    Physical Exam--  Vital Signs: T(F): 97.5 (12-07-23 @ 11:27), Max: 97.5 (12-07-23 @ 04:32)  HR: 98 (12-07-23 @ 11:27)  BP: 105/74 (12-07-23 @ 11:27)  RR: 18 (12-07-23 @ 11:27)  SpO2: 97% (12-07-23 @ 11:27)  Wt(kg): --  General: Cachectic, temporal wasting, no acute distress  HEENT: AT/NC. Anicteric. Conjunctiva pink and moist.   Nodes: None palpable.  Lungs: Clear bilaterally without rales  Heart: Regular rate and rhythm. No Murmur.   Abdomen: Soft. Healed scar. Nondistended. diffuse mild ttp.  Extremities: No cyanosis or clubbing.   Skin: Warm. Dry. Good turgor. No rash.  Psychiatric: Delirium.    No recent labs    ACC: [id number] EXAM:  CT ABDOMEN AND PELVIS IC   ORDERED BY: AGRO DIAZ     PROCEDURE DATE:  11/30/2023          INTERPRETATION:  CLINICAL INFORMATION: Intestinal cancer with metastatic   disease. Abdominal pain and weakness.    COMPARISON: None.    CONTRAST/COMPLICATIONS:  IV Contrast: Omnipaque 350  96 cc administered   04 cc discarded  Oral Contrast: NONE  Complications: None reported at time of study completion    PROCEDURE:  CT of the Abdomen and Pelvis was performed.  Sagittal and coronal reformats were performed.    FINDINGS:  LOWER CHEST: Nodular opacities at the lung bases. Partially imaged   catheter tip terminating in the right atrium.    LIVER: Nodular liver contour. Numerous hepatic masses, consistent with   metastases given history of intestinal cancer. Largest mass is within the   left hepatic lobe measures 7.9 x 4.4 cm. A right hepatic lobe mass   measures 4.4 x 2.8 cm.  BILE DUCTS: Normal caliber.  GALLBLADDER: Within normal limits.  SPLEEN: Mildly enlarged.  PANCREAS: Diffuse parenchymal atrophy.  ADRENALS: Within normal limits.  KIDNEYS/URETERS: Within normal limits.    BLADDER: Within normal limits.  REPRODUCTIVE ORGANS: Bilobed right adnexal cyst measuring 3.2 cm.    BOWEL: Gastrojejunostomy. No bowel obstruction. Wall thickened loops of   small bowel. Wall thickening of the right hemicolon and transverse colon.  PERITONEUM: Large volume ascites.  VESSELS: Atherosclerotic change.  RETROPERITONEUM/LYMPH NODES: Enlarged retroperitoneal/upper abdominal and   right pelvic lymph nodes.  ABDOMINAL WALL: Within normal limits.  BONES: Within normal limits.    IMPRESSION:  1.  Hepatic metastatic disease.  2.  Wall-thickened small bowel and ascending/transverse colon suggests   enterocolitis. No bowel obstruction.  3.  Large volume ascites.      Impression:    1. Enterocolitis - based on imaging. Patient was not having diarrhea. Likely related to ascites/hypoalbuminemia.   2. Nutritional status - since consultation, goals of care have changed.     Recommend palliative care and management    Please call back with questions   63F with hx metastatic duodenal cancer admitted with ascites and sepsis.   GI consulted last night for enterocolitis seen on CT and to see if anything could be done to optimize nutritional status.   Is now comfort care, awaiting hospice bed.   Hx obtained from daughter at bedside.  Patient is having bm, not diarrhea.    PMH/PSH--  Hypertension  Anxiety  Asthma  HLD (hyperlipidemia)  Adenocarcinoma of small intestine, stage 4  COPD without exacerbation  Gastrojejunostomy    Allergies--  No Known Allergies    Medications--  Other: acetaminophen  Suppository .. PRN  albuterol    90 MICROgram(s) HFA Inhaler PRN  bisacodyl Suppository PRN  chlorhexidine 2% Cloths  glycopyrrolate Injectable PRN  HYDROmorphone  Injectable  HYDROmorphone  Injectable PRN  LORazepam   Injectable PRN  montelukast  ondansetron Injectable PRN  QUEtiapine  sodium chloride 0.9%    Social History--  EtOH: denies   Tobacco: denies   Drug Use: denies     Family/Marital History--    FH: myocardial infarction    Review of Systems: unable to obtain due to patient mental status    Physical Exam--  Vital Signs: T(F): 97.5 (12-07-23 @ 11:27), Max: 97.5 (12-07-23 @ 04:32)  HR: 98 (12-07-23 @ 11:27)  BP: 105/74 (12-07-23 @ 11:27)  RR: 18 (12-07-23 @ 11:27)  SpO2: 97% (12-07-23 @ 11:27)  Wt(kg): --  General: Cachectic, temporal wasting, no acute distress  HEENT: AT/NC. Anicteric. Conjunctiva pink and moist.   Nodes: None palpable.  Lungs: Clear bilaterally without rales  Heart: Regular rate and rhythm. No Murmur.   Abdomen: Soft. Healed scar. Nondistended. diffuse mild ttp.  Extremities: No cyanosis or clubbing.   Skin: Warm. Dry. Good turgor. No rash.  Psychiatric: Delirium.    No recent labs    ACC: [id number] EXAM:  CT ABDOMEN AND PELVIS IC   ORDERED BY: GARO DIAZ     PROCEDURE DATE:  11/30/2023          INTERPRETATION:  CLINICAL INFORMATION: Intestinal cancer with metastatic   disease. Abdominal pain and weakness.    COMPARISON: None.    CONTRAST/COMPLICATIONS:  IV Contrast: Omnipaque 350  96 cc administered   04 cc discarded  Oral Contrast: NONE  Complications: None reported at time of study completion    PROCEDURE:  CT of the Abdomen and Pelvis was performed.  Sagittal and coronal reformats were performed.    FINDINGS:  LOWER CHEST: Nodular opacities at the lung bases. Partially imaged   catheter tip terminating in the right atrium.    LIVER: Nodular liver contour. Numerous hepatic masses, consistent with   metastases given history of intestinal cancer. Largest mass is within the   left hepatic lobe measures 7.9 x 4.4 cm. A right hepatic lobe mass   measures 4.4 x 2.8 cm.  BILE DUCTS: Normal caliber.  GALLBLADDER: Within normal limits.  SPLEEN: Mildly enlarged.  PANCREAS: Diffuse parenchymal atrophy.  ADRENALS: Within normal limits.  KIDNEYS/URETERS: Within normal limits.    BLADDER: Within normal limits.  REPRODUCTIVE ORGANS: Bilobed right adnexal cyst measuring 3.2 cm.    BOWEL: Gastrojejunostomy. No bowel obstruction. Wall thickened loops of   small bowel. Wall thickening of the right hemicolon and transverse colon.  PERITONEUM: Large volume ascites.  VESSELS: Atherosclerotic change.  RETROPERITONEUM/LYMPH NODES: Enlarged retroperitoneal/upper abdominal and   right pelvic lymph nodes.  ABDOMINAL WALL: Within normal limits.  BONES: Within normal limits.    IMPRESSION:  1.  Hepatic metastatic disease.  2.  Wall-thickened small bowel and ascending/transverse colon suggests   enterocolitis. No bowel obstruction.  3.  Large volume ascites.      Impression:    1. Enterocolitis - based on imaging. Patient was not having diarrhea. Likely related to ascites/hypoalbuminemia.   2. Nutritional status - since consultation, goals of care have changed.     Recommend palliative care and management    Please call back with questions

## 2023-12-07 NOTE — PROGRESS NOTE ADULT - NUTRITIONAL ASSESSMENT
This patient has been assessed with a concern for Malnutrition and has been determined to have a diagnosis/diagnoses of Severe protein-calorie malnutrition and Underweight (BMI < 19).    This patient is being managed with:   Diet Soft and Bite Sized-  Low Sodium  Entered: Dec  2 2023 12:08PM  
This patient has been assessed with a concern for Malnutrition and has been determined to have a diagnosis/diagnoses of Severe protein-calorie malnutrition and Underweight (BMI < 19).    This patient is being managed with:   Diet Minced and Moist-  Entered: Dec  3 2023  1:17PM  
This patient has been assessed with a concern for Malnutrition and has been determined to have a diagnosis/diagnoses of Severe protein-calorie malnutrition and Underweight (BMI < 19).    This patient is being managed with:   Diet Minced and Moist-  Entered: Dec  3 2023  1:17PM    The following pending diet order is being considered for treatment of Severe protein-calorie malnutrition and Underweight (BMI < 19):  Diet Pureed-  Liquid Protein Supplement     Qty per Day:  6  Supplement Feeding Modality:  Oral  Ensure Clear Cans or Servings Per Day:  1       Frequency:  Three Times a day  Ensure Plant-Based Cans or Servings Per Day:  1       Frequency:  Three Times a day  Entered: Dec  6 2023  3:14PM  
This patient has been assessed with a concern for Malnutrition and has been determined to have a diagnosis/diagnoses of Severe protein-calorie malnutrition and Underweight (BMI < 19).    This patient is being managed with:   Diet Minced and Moist-  Entered: Dec  3 2023  1:17PM

## 2023-12-07 NOTE — CHART NOTE - NSCHARTNOTEFT_GEN_A_CORE
Chart reviewed and events to date noted. Consecutive refusals from patient noted; preference of family to be transferred to another facility. Now being worked up for hospice care. Discussed c Dr. Ziegler, agreed to discontinue PT services. PT now signing off.

## 2023-12-07 NOTE — PROGRESS NOTE ADULT - SUBJECTIVE AND OBJECTIVE BOX
63y Female w/ history of Stage IV duodenal cancer w/ mets, Anxiety, HTN & COPD presented w/ recurrent Abdominal ascites, elevated creatinine on CKD?, sepsis, possibly secondary to enterocolitis. She is lying in bed in NAD.      MEDICATIONS  (STANDING):  chlorhexidine 2% Cloths 1 Application(s) Topical daily  HYDROmorphone  Injectable 1 milliGRAM(s) IV Push every 4 hours  sodium chloride 0.9%. 1000 milliLiter(s) (45 mL/Hr) IV Continuous <Continuous>    MEDICATIONS  (PRN):  acetaminophen  Suppository .. 650 milliGRAM(s) Rectal every 6 hours PRN Temp greater or equal to 38C (100.4F)  bisacodyl Suppository 10 milliGRAM(s) Rectal daily PRN Constipation  glycopyrrolate Injectable 0.4 milliGRAM(s) IV Push every 6 hours PRN copious oral secretions  HYDROmorphone  Injectable 2 milliGRAM(s) IV Push every 1 hour PRN severe pain or dyspnea  LORazepam   Injectable 1 milliGRAM(s) IV Push every 4 hours PRN Agitation  ondansetron Injectable 4 milliGRAM(s) IV Push every 6 hours PRN Nausea and/or Vomiting      Allergies    No Known Allergies    Intolerances        Vital Signs Last 24 Hrs  T(C): 36.4 (07 Dec 2023 16:06), Max: 36.4 (07 Dec 2023 04:32)  T(F): 97.6 (07 Dec 2023 16:06), Max: 97.6 (07 Dec 2023 16:06)  HR: 97 (07 Dec 2023 16:06) (60 - 98)  BP: 104/70 (07 Dec 2023 16:06) (90/59 - 114/80)   RR: 17 (07 Dec 2023 16:06) (17 - 18)  SpO2: 92% (07 Dec 2023 16:06) (92% - 97%)        PHYSICAL EXAM:  GENERAL: NAD, well-groomed, well-developed  HEAD:  Atraumatic, Normocephalic  EYES: EOMI, PERRLA   NECK: Supple   NERVOUS SYSTEM: lethargic   CHEST/LUNG: Clear to auscultation bilaterally; No rales, rhonchi, wheezing, or rubs  HEART: Regular rate and rhythm; No murmurs, rubs, or gallops  ABDOMEN: Soft, Nontender, Nondistended; Bowel sounds present  EXTREMITIES: No clubbing, cyanosis, or edema      LABS:         Culture - Body Fluid with Gram Stain (collected 04 Dec 2023 09:27)  Source: Peritoneal Peritoneal Fluid  Gram Stain (prelim) (05 Dec 2023 10:26):    polymorphonuclear leukocytes seen    No organisms seen    by cytocentrifuge  Preliminary Report (05 Dec 2023 10:26):    No growth      RADIOLOGY & ADDITIONAL TESTS:    12-06-23 @ 07:01  -  12-07-23 @ 07:00  --------------------------------------------------------  IN:  Total IN: 0 mL    OUT:    Voided (mL): 0 mL  Total OUT: 0 mL    Total NET: 0 mL       63y Female w/ history of Stage IV duodenal cancer w/ mets, Anxiety, HTN & COPD presented w/ recurrent Abdominal ascites, elevated creatinine on CKD?, sepsis, possibly secondary to enterocolitis. She is lying in bed in NAD.      MEDICATIONS  (STANDING):  chlorhexidine 2% Cloths 1 Application(s) Topical daily  HYDROmorphone  Injectable 1 milliGRAM(s) IV Push every 4 hours  sodium chloride 0.9%. 1000 milliLiter(s) (45 mL/Hr) IV Continuous <Continuous>    MEDICATIONS  (PRN):  acetaminophen  Suppository .. 650 milliGRAM(s) Rectal every 6 hours PRN Temp greater or equal to 38C (100.4F)  bisacodyl Suppository 10 milliGRAM(s) Rectal daily PRN Constipation  glycopyrrolate Injectable 0.4 milliGRAM(s) IV Push every 6 hours PRN copious oral secretions  HYDROmorphone  Injectable 2 milliGRAM(s) IV Push every 1 hour PRN severe pain or dyspnea  LORazepam   Injectable 1 milliGRAM(s) IV Push every 4 hours PRN Agitation  ondansetron Injectable 4 milliGRAM(s) IV Push every 6 hours PRN Nausea and/or Vomiting      Allergies    No Known Allergies    Intolerances    Vital Signs Last 24 Hrs  T(C): 36.4 (07 Dec 2023 16:06), Max: 36.4 (07 Dec 2023 04:32)  T(F): 97.6 (07 Dec 2023 16:06), Max: 97.6 (07 Dec 2023 16:06)  HR: 97 (07 Dec 2023 16:06) (60 - 98)  BP: 104/70 (07 Dec 2023 16:06) (90/59 - 114/80)   RR: 17 (07 Dec 2023 16:06) (17 - 18)  SpO2: 92% (07 Dec 2023 16:06) (92% - 97%)      PHYSICAL EXAM:  GENERAL: NAD, well-groomed, well-developed  HEAD:  Atraumatic, Normocephalic  EYES: EOMI, PERRLA   NECK: Supple   NERVOUS SYSTEM: lethargic   CHEST/LUNG: Clear to auscultation bilaterally; No rales, rhonchi, wheezing, or rubs  HEART: Regular rate and rhythm; No murmurs, rubs, or gallops  ABDOMEN: Soft, Nontender, Nondistended; Bowel sounds present  EXTREMITIES: No clubbing, cyanosis, or edema      LABS:     Culture - Body Fluid with Gram Stain (collected 04 Dec 2023 09:27)  Source: Peritoneal Peritoneal Fluid  Gram Stain (prelim) (05 Dec 2023 10:26):    polymorphonuclear leukocytes seen    No organisms seen    by cytocentrifuge  Preliminary Report (05 Dec 2023 10:26):    No growth      RADIOLOGY & ADDITIONAL TESTS:    12-06-23 @ 07:01  -  12-07-23 @ 07:00  --------------------------------------------------------  IN:  Total IN: 0 mL    OUT:    Voided (mL): 0 mL  Total OUT: 0 mL    Total NET: 0 mL

## 2023-12-07 NOTE — CONSULT NOTE ADULT - REASON FOR ADMISSION
Abdominal ascites, elevated creatinine on CKD?, sepsis, possibly secondary to enterocolitis

## 2023-12-07 NOTE — PROGRESS NOTE ADULT - PROBLEM SELECTOR PLAN 6
See GOC above.  Patient with metastatic cancer appears to be in the active dying process.  She is no longer tolerating po intake, meds adjusted to IV.  Focus of care is comfort.  Inpatient hospice referrals placed.  MOLST drafted indicating DNR/I and comfort, no further labs.  Chaplaincy support offered and accepted.      Discussed with Dr. Ziegler and Ceci KELLER

## 2023-12-07 NOTE — PROVIDER CONTACT NOTE (OTHER) - ASSESSMENT
Given standing dose 1mg Dilaudid at 21:00 and PRN dose 2mg Dilaudid at 22:20. V/s recorded in flow sheet. Pt moaning in pain.

## 2023-12-07 NOTE — PROGRESS NOTE ADULT - SUBJECTIVE AND OBJECTIVE BOX
SUBJECTIVE AND OBJECTIVE: Patient laying in bed, lethargic, moans at times, not answering questions  INTERVAL HPI/OVERNIGHT EVENTS:    DNR on chart: yes  Allergies    No Known Allergies    Intolerances    MEDICATIONS  (STANDING):  chlorhexidine 2% Cloths 1 Application(s) Topical daily  HYDROmorphone  Injectable 1 milliGRAM(s) IV Push every 4 hours  montelukast 10 milliGRAM(s) Oral daily  pantoprazole    Tablet 40 milliGRAM(s) Oral before breakfast  QUEtiapine 50 milliGRAM(s) Oral at bedtime  sodium chloride 0.9%. 1000 milliLiter(s) (60 mL/Hr) IV Continuous <Continuous>    MEDICATIONS  (PRN):  acetaminophen  Suppository .. 650 milliGRAM(s) Rectal every 6 hours PRN Temp greater or equal to 38C (100.4F)  albuterol    90 MICROgram(s) HFA Inhaler 2 Puff(s) Inhalation every 6 hours PRN Shortness of Breath and/or Wheezing  bisacodyl Suppository 10 milliGRAM(s) Rectal daily PRN Constipation  glycopyrrolate Injectable 0.4 milliGRAM(s) IV Push every 6 hours PRN copious oral secretions  HYDROmorphone  Injectable 2 milliGRAM(s) IV Push every 1 hour PRN severe pain or dyspnea  LORazepam   Injectable 1 milliGRAM(s) IV Push every 4 hours PRN Agitation  ondansetron Injectable 4 milliGRAM(s) IV Push every 6 hours PRN Nausea and/or Vomiting      ITEMS UNCHECKED ARE NOT PRESENT    PRESENT SYMPTOMS: [ ]Unable to obtain due to poor mentation   Source if other than patient:  [ ]Family   [ ]Team     Pain:  [ ]yes [ ]no  QOL impact -   Location -                    Aggravating factors -  Quality -  Radiation -  Timing-  Severity (0-10 scale):  Minimal acceptable level (0-10 scale):     Dyspnea:                           [ ]Mild [ ]Moderate [ ]Severe  Anxiety:                             [ ]Mild [ ]Moderate [ ]Severe  Fatigue:                             [ ]Mild [ ]Moderate [ ]Severe  Nausea:                             [ ]Mild [ ]Moderate [ ]Severe  Loss of appetite:              [ ]Mild [ ]Moderate [ ]Severe  Constipation:                    [ ]Mild [ ]Moderate [ ]Severe    PAIN AD Score:	  [email or web address]/painad.pdf (Ctrl + left click to view)    Other Symptoms:  [ ]All other review of systems negative     Palliative Performance Status Version 2:         %      http://npcrc.org/files/news/palliative_performance_scale_[email or web address].pdf  PHYSICAL EXAM:  Vital Signs Last 24 Hrs  T(C): 36.4 (07 Dec 2023 11:27), Max: 36.4 (07 Dec 2023 04:32)  T(F): 97.5 (07 Dec 2023 11:27), Max: 97.5 (07 Dec 2023 04:32)  HR: 98 (07 Dec 2023 11:27) (60 - 107)  BP: 105/74 (07 Dec 2023 11:27) (90/59 - 114/80)  BP(mean): --  RR: 18 (07 Dec 2023 11:27) (16 - 18)  SpO2: 97% (07 Dec 2023 11:27) (95% - 97%)    Parameters below as of 06 Dec 2023 15:18  Patient On (Oxygen Delivery Method): room air     I&O's Summary    06 Dec 2023 07:01  -  07 Dec 2023 07:00  --------------------------------------------------------  IN: 0 mL / OUT: 0 mL / NET: 0 mL       GENERAL:  [ ]Alert  [ ]Oriented x   [ ]Lethargic  [ ]Cachexia  [ ]Unarousable  [ ]Verbal  [ ]Non-Verbal  Behavioral:   [ ]Anxiety  [ ]Delirium [ ]Agitation [ ]Other  HEENT:  [ ]Normal   [ ]Dry mouth   [ ]ET Tube/Trach  [ ]Oral lesions  PULMONARY:   [ ]Clear [ ]Tachypnea  [ ]Audible excessive secretions   [ ]Rhonchi        [ ]Right [ ]Left [ ]Bilateral  [ ]Crackles        [ ]Right [ ]Left [ ]Bilateral  [ ]Wheezing     [ ]Right [ ]Left [ ]Bilateral  [ ]Diminished BS [ ] Right [ ]Left [ ]Bilateral  CARDIOVASCULAR:    [ ]Regular [ ]Irregular [ ]Tachy  [ ]Rashad [ ]Murmur [ ]Other  GASTROINTESTINAL:  [ ]Soft  [ ]Distended   [ ]+BS  [ ]Non tender [ ]Tender  [ ]PEG [ ]OGT/ NGT   Last BM:    GENITOURINARY:  [ ]Normal [ ]Incontinent   [ ]Oliguria/Anuria   [ ]Ramírez  MUSCULOSKELETAL:   [ ]Normal   [ ]Weakness  [ ]Bed/Wheelchair bound [ ]Edema  NEUROLOGIC:   [ ]No focal deficits  [ ] Cognitive impairment  [ ] Dysphagia [ ]Dysarthria [ ] Paresis [ ]Other   SKIN:   [ ]Normal  [ ]Rash   [ ]Pressure ulcer(s) [ ]y [ ]n present on admission    CRITICAL CARE:  [ ]Shock Present  [ ]Septic [ ]Cardiogenic [ ]Neurologic [ ]Hypovolemic  [ ]Vasopressors [ ]Inotropes  [ ]Respiratory failure present [ ]Mechanical Ventilation [ ]Non-invasive ventilatory support [ ]High-Flow  [ ]Acute  [ ]Chronic [ ]Hypoxic  [ ]Hypercarbic [ ]Other  [ ]Other organ failure     LABS:            RADIOLOGY & ADDITIONAL STUDIES:    Protein Calorie Malnutrition Present: [ ]mild [ ]moderate [ ]severe [ ]underweight [ ]morbid obesity  https://www.andeal.org/vault/2440/web/files/ONC/Table_Clinical%20Characteristics%20to%20Document%20Malnutrition-White%20JV%20et%20al%819345.pdf    Height (cm): 167.6 (12-02-23 @ 12:13)    [ ]PPSV2 < or = 30%  [ ]significant weight loss [ ]poor nutritional intake [ ]anasarca    [ ]Artificial Nutrition    REFERRALS:   [ ]Chaplaincy  [ ]Hospice  [ ]Child Life  [ ]Social Work  [ ]Case management [ ]Holistic Therapy     Goals of Care Document:     SUBJECTIVE AND OBJECTIVE: Patient laying in bed, lethargic, moans at times, not answering questions  INTERVAL HPI/OVERNIGHT EVENTS:    DNR on chart: yes  Allergies    No Known Allergies    Intolerances    MEDICATIONS  (STANDING):  chlorhexidine 2% Cloths 1 Application(s) Topical daily  HYDROmorphone  Injectable 1 milliGRAM(s) IV Push every 4 hours  montelukast 10 milliGRAM(s) Oral daily  pantoprazole    Tablet 40 milliGRAM(s) Oral before breakfast  QUEtiapine 50 milliGRAM(s) Oral at bedtime  sodium chloride 0.9%. 1000 milliLiter(s) (60 mL/Hr) IV Continuous <Continuous>    MEDICATIONS  (PRN):  acetaminophen  Suppository .. 650 milliGRAM(s) Rectal every 6 hours PRN Temp greater or equal to 38C (100.4F)  albuterol    90 MICROgram(s) HFA Inhaler 2 Puff(s) Inhalation every 6 hours PRN Shortness of Breath and/or Wheezing  bisacodyl Suppository 10 milliGRAM(s) Rectal daily PRN Constipation  glycopyrrolate Injectable 0.4 milliGRAM(s) IV Push every 6 hours PRN copious oral secretions  HYDROmorphone  Injectable 2 milliGRAM(s) IV Push every 1 hour PRN severe pain or dyspnea  LORazepam   Injectable 1 milliGRAM(s) IV Push every 4 hours PRN Agitation  ondansetron Injectable 4 milliGRAM(s) IV Push every 6 hours PRN Nausea and/or Vomiting      ITEMS UNCHECKED ARE NOT PRESENT    PRESENT SYMPTOMS: [ ]Unable to obtain due to poor mentation   Source if other than patient:  [ ]Family   [ ]Team     Pain:  [ ]yes [ ]no  QOL impact -   Location -                    Aggravating factors -  Quality -  Radiation -  Timing-  Severity (0-10 scale):  Minimal acceptable level (0-10 scale):     Dyspnea:                           [ ]Mild [ ]Moderate [ ]Severe  Anxiety:                             [ ]Mild [ ]Moderate [ ]Severe  Fatigue:                             [ ]Mild [ ]Moderate [ ]Severe  Nausea:                             [ ]Mild [ ]Moderate [ ]Severe  Loss of appetite:              [ ]Mild [ ]Moderate [ ]Severe  Constipation:                    [ ]Mild [ ]Moderate [ ]Severe    PAIN AD Score:	  http://[email or web address].[email or web address]/cog/painad.pdf (Ctrl + left click to view)    Other Symptoms:  [ ]All other review of systems negative     Palliative Performance Status Version 2:         %      http://npcrc.org/files/news/palliative_performance_scale_[email or web address].pdf  PHYSICAL EXAM:  Vital Signs Last 24 Hrs  T(C): 36.4 (07 Dec 2023 11:27), Max: 36.4 (07 Dec 2023 04:32)  T(F): 97.5 (07 Dec 2023 11:27), Max: 97.5 (07 Dec 2023 04:32)  HR: 98 (07 Dec 2023 11:27) (60 - 107)  BP: 105/74 (07 Dec 2023 11:27) (90/59 - 114/80)  BP(mean): --  RR: 18 (07 Dec 2023 11:27) (16 - 18)  SpO2: 97% (07 Dec 2023 11:27) (95% - 97%)    Parameters below as of 06 Dec 2023 15:18  Patient On (Oxygen Delivery Method): room air     I&O's Summary    06 Dec 2023 07:01  -  07 Dec 2023 07:00  --------------------------------------------------------  IN: 0 mL / OUT: 0 mL / NET: 0 mL       GENERAL:  [ ]Alert  [ ]Oriented x   [ ]Lethargic  [ ]Cachexia  [ ]Unarousable  [ ]Verbal  [ ]Non-Verbal  Behavioral:   [ ]Anxiety  [ ]Delirium [ ]Agitation [ ]Other  HEENT:  [ ]Normal   [ ]Dry mouth   [ ]ET Tube/Trach  [ ]Oral lesions  PULMONARY:   [ ]Clear [ ]Tachypnea  [ ]Audible excessive secretions   [ ]Rhonchi        [ ]Right [ ]Left [ ]Bilateral  [ ]Crackles        [ ]Right [ ]Left [ ]Bilateral  [ ]Wheezing     [ ]Right [ ]Left [ ]Bilateral  [ ]Diminished BS [ ] Right [ ]Left [ ]Bilateral  CARDIOVASCULAR:    [ ]Regular [ ]Irregular [ ]Tachy  [ ]Rashad [ ]Murmur [ ]Other  GASTROINTESTINAL:  [ ]Soft  [ ]Distended   [ ]+BS  [ ]Non tender [ ]Tender  [ ]PEG [ ]OGT/ NGT   Last BM:    GENITOURINARY:  [ ]Normal [ ]Incontinent   [ ]Oliguria/Anuria   [ ]Ramírez  MUSCULOSKELETAL:   [ ]Normal   [ ]Weakness  [ ]Bed/Wheelchair bound [ ]Edema  NEUROLOGIC:   [ ]No focal deficits  [ ] Cognitive impairment  [ ] Dysphagia [ ]Dysarthria [ ] Paresis [ ]Other   SKIN:   [ ]Normal  [ ]Rash   [ ]Pressure ulcer(s) [ ]y [ ]n present on admission    CRITICAL CARE:  [ ]Shock Present  [ ]Septic [ ]Cardiogenic [ ]Neurologic [ ]Hypovolemic  [ ]Vasopressors [ ]Inotropes  [ ]Respiratory failure present [ ]Mechanical Ventilation [ ]Non-invasive ventilatory support [ ]High-Flow  [ ]Acute  [ ]Chronic [ ]Hypoxic  [ ]Hypercarbic [ ]Other  [ ]Other organ failure     LABS:            RADIOLOGY & ADDITIONAL STUDIES:    Protein Calorie Malnutrition Present: [ ]mild [ ]moderate [ ]severe [ ]underweight [ ]morbid obesity  https://www.andeal.org/vault/2440/web/files/ONC/Table_Clinical%20Characteristics%20to%20Document%20Malnutrition-White%20JV%20et%20al%864555.pdf    Height (cm): 167.6 (12-02-23 @ 12:13)    [ ]PPSV2 < or = 30%  [ ]significant weight loss [ ]poor nutritional intake [ ]anasarca    [ ]Artificial Nutrition    REFERRALS:   [ ]Chaplaincy  [ ]Hospice  [ ]Child Life  [ ]Social Work  [ ]Case management [ ]Holistic Therapy     Goals of Care Document:     SUBJECTIVE AND OBJECTIVE: Patient laying in bed, lethargic, moans at times, not answering questions  INTERVAL HPI/OVERNIGHT EVENTS: unable to take po    DNR on chart: yes  Allergies    No Known Allergies    Intolerances    MEDICATIONS  (STANDING):  chlorhexidine 2% Cloths 1 Application(s) Topical daily  HYDROmorphone  Injectable 1 milliGRAM(s) IV Push every 4 hours  montelukast 10 milliGRAM(s) Oral daily  pantoprazole    Tablet 40 milliGRAM(s) Oral before breakfast  QUEtiapine 50 milliGRAM(s) Oral at bedtime  sodium chloride 0.9%. 1000 milliLiter(s) (60 mL/Hr) IV Continuous <Continuous>    MEDICATIONS  (PRN):  acetaminophen  Suppository .. 650 milliGRAM(s) Rectal every 6 hours PRN Temp greater or equal to 38C (100.4F)  albuterol    90 MICROgram(s) HFA Inhaler 2 Puff(s) Inhalation every 6 hours PRN Shortness of Breath and/or Wheezing  bisacodyl Suppository 10 milliGRAM(s) Rectal daily PRN Constipation  glycopyrrolate Injectable 0.4 milliGRAM(s) IV Push every 6 hours PRN copious oral secretions  HYDROmorphone  Injectable 2 milliGRAM(s) IV Push every 1 hour PRN severe pain or dyspnea  LORazepam   Injectable 1 milliGRAM(s) IV Push every 4 hours PRN Agitation  ondansetron Injectable 4 milliGRAM(s) IV Push every 6 hours PRN Nausea and/or Vomiting      ITEMS UNCHECKED ARE NOT PRESENT    PRESENT SYMPTOMS: [x ]Unable to obtain due to poor mentation   Source if other than patient:  [ ]Family   [ ]Team     Pain:  [ ]yes [ ]no  QOL impact -   Location -                    Aggravating factors -  Quality -  Radiation -  Timing-  Severity (0-10 scale):  Minimal acceptable level (0-10 scale):     Dyspnea:                           [ ]Mild [ ]Moderate [ ]Severe  Anxiety:                             [ ]Mild [ ]Moderate [ ]Severe  Fatigue:                             [ ]Mild [ ]Moderate [ ]Severe  Nausea:                             [ ]Mild [ ]Moderate [ ]Severe  Loss of appetite:              [ ]Mild [ ]Moderate [ ]Severe  Constipation:                    [ ]Mild [ ]Moderate [ ]Severe    PAIN AD Score:	  http://geriatrictoolkit.[email or web address].[facility]/cog/painad.pdf (Ctrl + left click to view)    Other Symptoms:  [ ]All other review of systems negative     Palliative Performance Status Version 2:        10 %      http://[email or web address]/files/news/palliative_performance_scale_[email or web address].pdf  PHYSICAL EXAM:  Vital Signs Last 24 Hrs  T(C): 36.4 (07 Dec 2023 11:27), Max: 36.4 (07 Dec 2023 04:32)  T(F): 97.5 (07 Dec 2023 11:27), Max: 97.5 (07 Dec 2023 04:32)  HR: 98 (07 Dec 2023 11:27) (60 - 107)  BP: 105/74 (07 Dec 2023 11:27) (90/59 - 114/80)  BP(mean): --  RR: 18 (07 Dec 2023 11:27) (16 - 18)  SpO2: 97% (07 Dec 2023 11:27) (95% - 97%)    Parameters below as of 06 Dec 2023 15:18  Patient On (Oxygen Delivery Method): room air     I&O's Summary    06 Dec 2023 07:01  -  07 Dec 2023 07:00  --------------------------------------------------------  IN: 0 mL / OUT: 0 mL / NET: 0 mL       GENERAL:  [ ]Alert  [ ]Oriented x   [ x]Lethargic  [ ]Cachexia  [ ]Unarousable  [ ]Verbal  [ ]Non-Verbal moans, not answering questions  Behavioral:   [ ]Anxiety  [ ]Delirium [ ]Agitation [ ]Other  HEENT:  [ ]Normal   [x ]Dry mouth   [ ]ET Tube/Trach  [ ]Oral lesions  PULMONARY:   [ ]Clear [ ]Tachypnea  [ ]Audible excessive secretions   [ ]Rhonchi        [ ]Right [ ]Left [ ]Bilateral  [ ]Crackles        [ ]Right [ ]Left [ ]Bilateral  [ ]Wheezing     [ ]Right [ ]Left [ ]Bilateral  [x ]Diminished BS [ ] Right [ ]Left [ x]Bilateral  CARDIOVASCULAR:    [x ]Regular [ ]Irregular [ ]Tachy  [ ]Rashad [ ]Murmur [ ]Other  GASTROINTESTINAL:  [ x]Soft  [ ]Distended   [ ]+BS  [ ]Non tender [ ]Tender  [ ]PEG [ ]OGT/ NGT   Last BM:  12/5/23  GENITOURINARY:  [ ]Normal [ x]Incontinent   [ ]Oliguria/Anuria   [ ]Ramírez  MUSCULOSKELETAL:   [ ]Normal   [x ]Weakness  [ ]Bed/Wheelchair bound [ ]Edema  NEUROLOGIC:   [ ]No focal deficits  [x ] Cognitive impairment  [ ] Dysphagia [ ]Dysarthria [ ] Paresis [ ]Other   SKIN:   [ ]Normal  [ ]Rash   [ ]Pressure ulcer(s) [ ]y [ ]n present on admission    CRITICAL CARE:  [ ]Shock Present  [ ]Septic [ ]Cardiogenic [ ]Neurologic [ ]Hypovolemic  [ ]Vasopressors [ ]Inotropes  [ ]Respiratory failure present [ ]Mechanical Ventilation [ ]Non-invasive ventilatory support [ ]High-Flow  [ ]Acute  [ ]Chronic [ ]Hypoxic  [ ]Hypercarbic [ ]Other  [ ]Other organ failure     LABS:  none new    RADIOLOGY & ADDITIONAL STUDIES: none new    Protein Calorie Malnutrition Present: [ ]mild [ ]moderate [x ]severe [ ]underweight [ ]morbid obesity  https://www.andeal.org/vault/2440/web/files/ONC/Table_Clinical%20Characteristics%20to%20Document%20Malnutrition-White%20JV%20et%20al%202012.pdf    Height (cm): 167.6 (12-02-23 @ 12:13)    [ x]PPSV2 < or = 30%  [ ]significant weight loss [x ]poor nutritional intake [ ]anasarca    [ ]Artificial Nutrition    REFERRALS:   [ ]Chaplaincy  [ ]Hospice  [ ]Child Life  [ ]Social Work  [ ]Case management [ ]Holistic Therapy     Goals of Care Document:     SUBJECTIVE AND OBJECTIVE: Patient laying in bed, lethargic, moans at times, not answering questions  INTERVAL HPI/OVERNIGHT EVENTS: unable to take po    DNR on chart: yes  Allergies    No Known Allergies    Intolerances    MEDICATIONS  (STANDING):  chlorhexidine 2% Cloths 1 Application(s) Topical daily  HYDROmorphone  Injectable 1 milliGRAM(s) IV Push every 4 hours  montelukast 10 milliGRAM(s) Oral daily  pantoprazole    Tablet 40 milliGRAM(s) Oral before breakfast  QUEtiapine 50 milliGRAM(s) Oral at bedtime  sodium chloride 0.9%. 1000 milliLiter(s) (60 mL/Hr) IV Continuous <Continuous>    MEDICATIONS  (PRN):  acetaminophen  Suppository .. 650 milliGRAM(s) Rectal every 6 hours PRN Temp greater or equal to 38C (100.4F)  albuterol    90 MICROgram(s) HFA Inhaler 2 Puff(s) Inhalation every 6 hours PRN Shortness of Breath and/or Wheezing  bisacodyl Suppository 10 milliGRAM(s) Rectal daily PRN Constipation  glycopyrrolate Injectable 0.4 milliGRAM(s) IV Push every 6 hours PRN copious oral secretions  HYDROmorphone  Injectable 2 milliGRAM(s) IV Push every 1 hour PRN severe pain or dyspnea  LORazepam   Injectable 1 milliGRAM(s) IV Push every 4 hours PRN Agitation  ondansetron Injectable 4 milliGRAM(s) IV Push every 6 hours PRN Nausea and/or Vomiting      ITEMS UNCHECKED ARE NOT PRESENT    PRESENT SYMPTOMS: [x ]Unable to obtain due to poor mentation   Source if other than patient:  [ ]Family   [ ]Team     Pain:  [ ]yes [ ]no  QOL impact -   Location -                    Aggravating factors -  Quality -  Radiation -  Timing-  Severity (0-10 scale):  Minimal acceptable level (0-10 scale):     Dyspnea:                           [ ]Mild [ ]Moderate [ ]Severe  Anxiety:                             [ ]Mild [ ]Moderate [ ]Severe  Fatigue:                             [ ]Mild [ ]Moderate [ ]Severe  Nausea:                             [ ]Mild [ ]Moderate [ ]Severe  Loss of appetite:              [ ]Mild [ ]Moderate [ ]Severe  Constipation:                    [ ]Mild [ ]Moderate [ ]Severe    PAIN AD Score:	  http://geriatrictoolkit.[place].[facility]/cog/painad.pdf (Ctrl + left click to view)    Other Symptoms:  [ ]All other review of systems negative     Palliative Performance Status Version 2:        10 %      http://[email or web address]/files/news/palliative_performance_scale_[email or web address].pdf  PHYSICAL EXAM:  Vital Signs Last 24 Hrs  T(C): 36.4 (07 Dec 2023 11:27), Max: 36.4 (07 Dec 2023 04:32)  T(F): 97.5 (07 Dec 2023 11:27), Max: 97.5 (07 Dec 2023 04:32)  HR: 98 (07 Dec 2023 11:27) (60 - 107)  BP: 105/74 (07 Dec 2023 11:27) (90/59 - 114/80)  BP(mean): --  RR: 18 (07 Dec 2023 11:27) (16 - 18)  SpO2: 97% (07 Dec 2023 11:27) (95% - 97%)    Parameters below as of 06 Dec 2023 15:18  Patient On (Oxygen Delivery Method): room air     I&O's Summary    06 Dec 2023 07:01  -  07 Dec 2023 07:00  --------------------------------------------------------  IN: 0 mL / OUT: 0 mL / NET: 0 mL       GENERAL:  [ ]Alert  [ ]Oriented x   [ x]Lethargic  [ ]Cachexia  [ ]Unarousable  [ ]Verbal  [ ]Non-Verbal moans, not answering questions  Behavioral:   [ ]Anxiety  [ ]Delirium [ ]Agitation [ ]Other  HEENT:  [ ]Normal   [x ]Dry mouth   [ ]ET Tube/Trach  [ ]Oral lesions  PULMONARY:   [ ]Clear [ ]Tachypnea  [ ]Audible excessive secretions   [ ]Rhonchi        [ ]Right [ ]Left [ ]Bilateral  [ ]Crackles        [ ]Right [ ]Left [ ]Bilateral  [ ]Wheezing     [ ]Right [ ]Left [ ]Bilateral  [x ]Diminished BS [ ] Right [ ]Left [ x]Bilateral  CARDIOVASCULAR:    [x ]Regular [ ]Irregular [ ]Tachy  [ ]Rashad [ ]Murmur [ ]Other  GASTROINTESTINAL:  [ x]Soft  [ ]Distended   [ ]+BS  [ ]Non tender [ ]Tender  [ ]PEG [ ]OGT/ NGT   Last BM:  12/5/23  GENITOURINARY:  [ ]Normal [ x]Incontinent   [ ]Oliguria/Anuria   [ ]Ramírez  MUSCULOSKELETAL:   [ ]Normal   [x ]Weakness  [ ]Bed/Wheelchair bound [ ]Edema  NEUROLOGIC:   [ ]No focal deficits  [x ] Cognitive impairment  [ ] Dysphagia [ ]Dysarthria [ ] Paresis [ ]Other   SKIN:   [ ]Normal  [ ]Rash   [ ]Pressure ulcer(s) [ ]y [ ]n present on admission    CRITICAL CARE:  [ ]Shock Present  [ ]Septic [ ]Cardiogenic [ ]Neurologic [ ]Hypovolemic  [ ]Vasopressors [ ]Inotropes  [ ]Respiratory failure present [ ]Mechanical Ventilation [ ]Non-invasive ventilatory support [ ]High-Flow  [ ]Acute  [ ]Chronic [ ]Hypoxic  [ ]Hypercarbic [ ]Other  [ ]Other organ failure     LABS:  none new    RADIOLOGY & ADDITIONAL STUDIES: none new    Protein Calorie Malnutrition Present: [ ]mild [ ]moderate [x ]severe [ ]underweight [ ]morbid obesity  https://www.andeal.org/vault/2440/web/files/ONC/Table_Clinical%20Characteristics%20to%20Document%20Malnutrition-White%20JV%20et%20al%202012.pdf    Height (cm): 167.6 (12-02-23 @ 12:13)    [ x]PPSV2 < or = 30%  [ ]significant weight loss [x ]poor nutritional intake [ ]anasarca    [ ]Artificial Nutrition    REFERRALS:   [ ]Chaplaincy  [ ]Hospice  [ ]Child Life  [ ]Social Work  [ ]Case management [ ]Holistic Therapy     Goals of Care Document:

## 2023-12-08 NOTE — PROGRESS NOTE ADULT - PROBLEM SELECTOR PLAN 2
was diagnosed in Jan 2023, follows with Dr. Mckee for oncology, per chart notes had chemo beginning of Nov of this year   hepatic mets CT chest with findings suggestive of pulmonary mets  recurrent ascites, has had 3 paracentesis in 2 weeks prior to admission, [facility] cultures with no growth  no longer swallowing or taking po, was accepted for inpatient hospice, however she is actively dying not able to transfer  ECOG 4 was diagnosed in Jan 2023, follows with Dr. Mckee for oncology, per chart notes had chemo beginning of Nov of this year   hepatic mets CT chest with findings suggestive of pulmonary mets  recurrent ascites, has had 3 paracentesis in 2 weeks prior to admission, [facility] cultures with [facility] growth  no longer swallowing or taking po, was accepted for inpatient hospice, however she is actively dying not able to transfer  ECOG 4

## 2023-12-08 NOTE — DISCHARGE NOTE FOR THE EXPIRED PATIENT - HOSPITAL COURSE
63y Female w/ history of Stage IV duodenal cancer w/ mets, Anxiety, HTN & COPD presented w/ recurrent Abdominal ascites, elevated creatinine on CKD?, sepsis, possibly secondary to enterocolitis.      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 Stanley Mckee cell  (spoke with Dr. Escobedo and will reach out to family)  - oncology note read and appreciated     HCV positive  - CT showed liver steatosis w/ mets    Enterocolitis POA  - likely viral or due to chemo meds  - CT showed gastrojejunostomy w/ all-thickened small bowel and ascending/transverse colon suggests enterocolitis  - patient doing well off antibiotics   - blood cxs negative     COPD  - c/w Singulair & Proventil     Elevated Cr  - likely CKD III    Hyponatremia  - likely chronic hypervolemic hyponatremia due to ascites     Anxiety  - Zoloft & Seroquel on hold as patient is not swallowing     Esophagitis on EGD in 3/2023  - hold sucralfate & pantoprazole as patient is not swallowing     Constipation  - c/w PRN Dulcolax    Prophylaxis:  DVT: Heparin  GI: Protonix       Family meeting on  held w/ palliative PA, patient's , Mino and daughter, Milagro. They have decided to put the patient on hospice care and do not want NGT and IV blood draws. Pt has been made DNR/DNI. Palliative PA has added Dilaudid and Ativan.      Pt  on  at 1100 with family at bedside.

## 2023-12-08 NOTE — PROGRESS NOTE ADULT - PROBLEM SELECTOR PLAN 6
Patient with metastatic cancer appears to be in the active dying process prognosis is anytime, likely hours to days.  She is no longer tolerating po intake, IV meds adjusted.  Focus of care is comfort.  Was accepted to inpatient hospice, but not appropriate to move given death is rather imminent.  MOLST on file indicating DNR/I and comfort, no further labs.  Chaplaincy support offered and accepted.      Discussed with Dr. Ziegler and NP Au Patient with metastatic cancer appears to be in the active dying process prognosis is anytime, likely hours or less.  She is no longer tolerating po intake, IV meds adjusted.  Focus of care is comfort.  Was accepted to inpatient hospice, but not appropriate to move given death is rather imminent.  MOLST on file indicating DNR/I and comfort, no further labs.  Chaplaincy support offered and accepted.      Discussed with Dr. Ziegler and NP Au

## 2023-12-08 NOTE — PROGRESS NOTE ADULT - PROBLEM SELECTOR PLAN 1
no longer able to take po, moaning at times, 1mg dilaudid IVP q 4 hours ATC and 2mg IVP dilaudid q 1 hours PRN  if patient takes more than 3 PRNs in a 24 hour period would recommend increasing dilaudid to 2mg q 4 hours PRN with 3mg IVP q 1 hour PRN for severe pain
received 3 doses in 24 hours, patient was reportedly in distress overnight.  Dilaudid gtt at 0.5mg/ hour, will take time to become therapeutic, monitor for breakthrough pain, likely will need PRN doses of dilaudid  appeared more comfortable after receiving PRN

## 2023-12-08 NOTE — PROGRESS NOTE ADULT - PROBLEM SELECTOR PLAN 4
Clinical evidence indicates that the patient has Severe protein calorie malnutrition/ 3rd degree    In context of Chronic Illness (>1 month)    Energy/Food intake <50% of estimated energy requirement >5 days  Weight loss: Moderate -36 pounds in 8 months  Body Fat loss: Severe   (Cachexia, temporal wasting, contracted, muscle atrophy)  Muscle mass loss: Severe  (Skin failure/pressure ulcers)  Fluid Accumulation: Severe (Fluid overload, ascites, pleural effusions)   Strength: weakened     Recommend:  patient appears to be in the active dying process, no longer tolerating po intake
Clinical evidence indicates that the patient has Severe protein calorie malnutrition/ 3rd degree    In context of Chronic Illness (>1 month)    Energy/Food intake <50% of estimated energy requirement >5 days  Weight loss: Moderate -36 pounds in 8 months  Body Fat loss: Severe   (Cachexia, temporal wasting, contracted, muscle atrophy)  Muscle mass loss: Severe  (Skin failure/pressure ulcers)  Fluid Accumulation: Severe (Fluid overload, ascites, pleural effusions)   Strength: weakened     Recommend:  patient appears to be in the active dying process, no longer tolerating po intake

## 2023-12-08 NOTE — PROGRESS NOTE ADULT - PROVIDER SPECIALTY LIST ADULT
Hospitalist
Internal Medicine
Hospitalist
Hospitalist
Palliative Care
Hospitalist
Palliative Care

## 2023-12-08 NOTE — PROGRESS NOTE ADULT - REASON FOR ADMISSION
Abdominal ascites, elevated creatinine on CKD?, sepsis, possibly secondary to enterocolitis

## 2023-12-08 NOTE — PROGRESS NOTE ADULT - SUBJECTIVE AND OBJECTIVE BOX
SUBJECTIVE AND OBJECTIVE: Patient in bed, appears to be in the active dying process, family at bedside  INTERVAL HPI/OVERNIGHT EVENTS:    DNR on chart: yes  Allergies    No Known Allergies    Intolerances    MEDICATIONS  (STANDING):  HYDROmorphone Infusion 0.5 mG/Hr (0.5 mL/Hr) IV Continuous <Continuous>    MEDICATIONS  (PRN):  acetaminophen  Suppository .. 650 milliGRAM(s) Rectal every 6 hours PRN Temp greater or equal to 38C (100.4F)  bisacodyl Suppository 10 milliGRAM(s) Rectal daily PRN Constipation  glycopyrrolate Injectable 0.4 milliGRAM(s) IV Push every 6 hours PRN copious oral secretions  HYDROmorphone  Injectable 2 milliGRAM(s) IV Push every 1 hour PRN severe pain or dyspnea  LORazepam   Injectable 1 milliGRAM(s) IV Push every 4 hours PRN Agitation  ondansetron Injectable 4 milliGRAM(s) IV Push every 6 hours PRN Nausea and/or Vomiting      ITEMS UNCHECKED ARE NOT PRESENT    PRESENT SYMPTOMS: [x ]Unable to obtain due to poor mentation   Source if other than patient:  [ ]Family   [ ]Team     Pain:  [ ]yes [ ]no  QOL impact -   Location -                    Aggravating factors -  Quality -  Radiation -  Timing-  Severity (0-10 scale):  Minimal acceptable level (0-10 scale):     Dyspnea:                           [ ]Mild [ ]Moderate [ ]Severe  Anxiety:                             [ ]Mild [ ]Moderate [ ]Severe  Fatigue:                             [ ]Mild [ ]Moderate [ ]Severe  Nausea:                             [ ]Mild [ ]Moderate [ ]Severe  Loss of appetite:              [ ]Mild [ ]Moderate [ ]Severe  Constipation:                    [ ]Mild [ ]Moderate [ ]Severe    PAIN AD Score:	  [email or web address].[email or web address]/painad.pdf (Ctrl + left click to view)    Other Symptoms:  [ ]All other review of systems negative     Palliative Performance Status Version 2:         10%      http://npcrc.org/files/news/palliative_performance_scale_ppsv2.pdf  PHYSICAL EXAM:  Vital Signs Last 24 Hrs  T(C): 36.3 (07 Dec 2023 23:15), Max: 36.4 (07 Dec 2023 11:27)  T(F): 97.3 (07 Dec 2023 23:15), Max: 97.6 (07 Dec 2023 16:06)  HR: 89 (07 Dec 2023 23:15) (89 - 98)  BP: 71/55 (07 Dec 2023 23:15) (71/55 - 105/74)  BP(mean): --  RR: 20 (07 Dec 2023 23:15) (17 - 20)  SpO2: 92% (07 Dec 2023 23:15) (92% - 97%)    Parameters below as of 07 Dec 2023 23:15  Patient On (Oxygen Delivery Method): room air     I&O's Summary     GENERAL:  [ ]Alert  [ ]Oriented x   [x ]Lethargic  [ ]Cachexia  [ ]Unarousable  [ ]Verbal  [ x]Non-Verbal  Behavioral:   [ ]Anxiety  [ ]Delirium [ ]Agitation [ ]Other  HEENT:  [ ]Normal   [x ]Dry mouth   [ ]ET Tube/Trach  [ ]Oral lesions  PULMONARY:   [ ]Clear [ x]Tachypnea  [ ]Audible excessive secretions   [ ]Rhonchi        [ ]Right [ ]Left [ ]Bilateral  [ ]Crackles        [ ]Right [ ]Left [ ]Bilateral  [ ]Wheezing     [ ]Right [ ]Left [ ]Bilateral  [ ]Diminished BS [ ] Right [ ]Left [ ]Bilateral  CARDIOVASCULAR:    [x ]Regular [ ]Irregular [ ]Tachy  [ ]Rashad [ ]Murmur [ ]Other  GASTROINTESTINAL:  [ ]Soft  [ ]Distended   [ ]+BS  [ ]Non tender [ ]Tender  [ ]PEG [ ]OGT/ NGT   Last BM:    GENITOURINARY:  [ ]Normal [ x]Incontinent   [ ]Oliguria/Anuria   [ ]Ramírez  MUSCULOSKELETAL:   [ ]Normal   [ ]Weakness  [x ]Bed/Wheelchair bound [ ]Edema  NEUROLOGIC:   [ ]No focal deficits  [ x] Cognitive impairment  [ ] Dysphagia [ ]Dysarthria [ ] Paresis [ ]Other   SKIN:   [ ]Normal  [ ]Rash   [ ]Pressure ulcer(s) [ ]y [ ]n present on admission    CRITICAL CARE:  [ ]Shock Present  [ ]Septic [ ]Cardiogenic [ ]Neurologic [ ]Hypovolemic  [ ]Vasopressors [ ]Inotropes  [ ]Respiratory failure present [ ]Mechanical Ventilation [ ]Non-invasive ventilatory support [ ]High-Flow  [ ]Acute  [ ]Chronic [ ]Hypoxic  [ ]Hypercarbic [ ]Other  [ ]Other organ failure     LABS: none new      RADIOLOGY & ADDITIONAL STUDIES: none new    Protein Calorie Malnutrition Present: [ ]mild [ ]moderate [ x]severe [ ]underweight [ ]morbid obesity  https://www.andeal.org/vault/2440/web/files/ONC/Table_Clinical%20Characteristics%20to%20Document%20Malnutrition-White%20JV%20et%20al%202012.pdf    Height (cm): 167.6 (12-02-23 @ 12:13)    [x ]PPSV2 < or = 30%  [ ]significant weight loss [x ]poor nutritional intake [ ]anasarca    [ ]Artificial Nutrition    REFERRALS:   [ ]Chaplaincy  [ ]Hospice  [ ]Child Life  [ ]Social Work  [ ]Case management [ ]Holistic Therapy     Goals of Care Document:     SUBJECTIVE AND OBJECTIVE: Patient in bed, appears to be in the active dying process, family at bedside  INTERVAL HPI/OVERNIGHT EVENTS:    DNR on chart: yes  Allergies    No Known Allergies    Intolerances    MEDICATIONS  (STANDING):  HYDROmorphone Infusion 0.5 mG/Hr (0.5 mL/Hr) IV Continuous <Continuous>    MEDICATIONS  (PRN):  acetaminophen  Suppository .. 650 milliGRAM(s) Rectal every 6 hours PRN Temp greater or equal to 38C (100.4F)  bisacodyl Suppository 10 milliGRAM(s) Rectal daily PRN Constipation  glycopyrrolate Injectable 0.4 milliGRAM(s) IV Push every 6 hours PRN copious oral secretions  HYDROmorphone  Injectable 2 milliGRAM(s) IV Push every 1 hour PRN severe pain or dyspnea  LORazepam   Injectable 1 milliGRAM(s) IV Push every 4 hours PRN Agitation  ondansetron Injectable 4 milliGRAM(s) IV Push every 6 hours PRN Nausea and/or Vomiting      ITEMS UNCHECKED ARE NOT PRESENT    PRESENT SYMPTOMS: [x ]Unable to obtain due to poor mentation   Source if other than patient:  [ ]Family   [ ]Team     Pain:  [ ]yes [ ]no  QOL impact -   Location -                    Aggravating factors -  Quality -  Radiation -  Timing-  Severity (0-10 scale):  Minimal acceptable level (0-10 scale):     Dyspnea:                           [ ]Mild [ ]Moderate [ ]Severe  Anxiety:                             [ ]Mild [ ]Moderate [ ]Severe  Fatigue:                             [ ]Mild [ ]Moderate [ ]Severe  Nausea:                             [ ]Mild [ ]Moderate [ ]Severe  Loss of appetite:              [ ]Mild [ ]Moderate [ ]Severe  Constipation:                    [ ]Mild [ ]Moderate [ ]Severe    PAIN AD Score:	  [email or web address].[email or web address].[email or web address]/cog/painad.pdf (Ctrl + left click to view)    Other Symptoms:  [ ]All other review of systems negative     Palliative Performance Status Version 2:         10%      http://npcrc.org/files/news/palliative_performance_scale_ppsv2.pdf  PHYSICAL EXAM:  Vital Signs Last 24 Hrs  T(C): 36.3 (07 Dec 2023 23:15), Max: 36.4 (07 Dec 2023 11:27)  T(F): 97.3 (07 Dec 2023 23:15), Max: 97.6 (07 Dec 2023 16:06)  HR: 89 (07 Dec 2023 23:15) (89 - 98)  BP: 71/55 (07 Dec 2023 23:15) (71/55 - 105/74)  BP(mean): --  RR: 20 (07 Dec 2023 23:15) (17 - 20)  SpO2: 92% (07 Dec 2023 23:15) (92% - 97%)    Parameters below as of 07 Dec 2023 23:15  Patient On (Oxygen Delivery Method): room air     I&O's Summary     GENERAL:  [ ]Alert  [ ]Oriented x   [x ]Lethargic  [ ]Cachexia  [ ]Unarousable  [ ]Verbal  [ x]Non-Verbal  Behavioral:   [ ]Anxiety  [ ]Delirium [ ]Agitation [ ]Other  HEENT:  [ ]Normal   [x ]Dry mouth   [ ]ET Tube/Trach  [ ]Oral lesions  PULMONARY:   [ ]Clear [ x]Tachypnea  [ ]Audible excessive secretions   [ ]Rhonchi        [ ]Right [ ]Left [ ]Bilateral  [ ]Crackles        [ ]Right [ ]Left [ ]Bilateral  [ ]Wheezing     [ ]Right [ ]Left [ ]Bilateral  [ ]Diminished BS [ ] Right [ ]Left [ ]Bilateral  CARDIOVASCULAR:    [x ]Regular [ ]Irregular [ ]Tachy  [ ]Rashad [ ]Murmur [ ]Other  GASTROINTESTINAL:  [ ]Soft  [ ]Distended   [ ]+BS  [ ]Non tender [ ]Tender  [ ]PEG [ ]OGT/ NGT   Last BM:    GENITOURINARY:  [ ]Normal [ x]Incontinent   [ ]Oliguria/Anuria   [ ]Ramírez  MUSCULOSKELETAL:   [ ]Normal   [ ]Weakness  [x ]Bed/Wheelchair bound [ ]Edema  NEUROLOGIC:   [ ]No focal deficits  [ x] Cognitive impairment  [ ] Dysphagia [ ]Dysarthria [ ] Paresis [ ]Other   SKIN:   [ ]Normal  [ ]Rash   [ ]Pressure ulcer(s) [ ]y [ ]n present on admission    CRITICAL CARE:  [ ]Shock Present  [ ]Septic [ ]Cardiogenic [ ]Neurologic [ ]Hypovolemic  [ ]Vasopressors [ ]Inotropes  [ ]Respiratory failure present [ ]Mechanical Ventilation [ ]Non-invasive ventilatory support [ ]High-Flow  [ ]Acute  [ ]Chronic [ ]Hypoxic  [ ]Hypercarbic [ ]Other  [ ]Other organ failure     LABS: none new      RADIOLOGY & ADDITIONAL STUDIES: none new    Protein Calorie Malnutrition Present: [ ]mild [ ]moderate [ x]severe [ ]underweight [ ]morbid obesity  https://www.andeal.org/vault/2440/web/files/ONC/Table_Clinical%20Characteristics%20to%20Document%20Malnutrition-White%20JV%20et%20al%202012.pdf    Height (cm): 167.6 (12-02-23 @ 12:13)    [x ]PPSV2 < or = 30%  [ ]significant weight loss [x ]poor nutritional intake [ ]anasarca    [ ]Artificial Nutrition    REFERRALS:   [ ]Chaplaincy  [ ]Hospice  [ ]Child Life  [ ]Social Work  [ ]Case management [ ]Holistic Therapy     Goals of Care Document:

## 2023-12-08 NOTE — CHART NOTE - NSCHARTNOTEFT_GEN_A_CORE
S) 63y Female w/ history of Stage IV duodenal cancer w/ mets, Anxiety, HTN & COPD presented w/ recurrent Abdominal ascites, elevated creatinine on CKD?, sepsis, possibly secondary to enterocolitis.  During this hospitalization patient has had a paracentesis with 4.7 liters of fluid removed.  Now is DNR/DNI on palliative care. Patient seen earlier in night by Dr. Gtz, and a fluid challenge of 250 ml of NS given due to persistent hypotension, and Toradol due to perceived pain. Family at bedside, with know recordable BP.  Discussed option with family, patient has standing order for hydromorphone and ativan to make the patient comfortable.  Discussed with Dr. Gtz.   Meds:  acetaminophen  Suppository .. 650 milliGRAM(s) Rectal every 6 hours PRN  bisacodyl Suppository 10 milliGRAM(s) Rectal daily PRN  chlorhexidine 2% Cloths 1 Application(s) Topical daily  glycopyrrolate Injectable 0.4 milliGRAM(s) IV Push every 6 hours PRN  HYDROmorphone  Injectable 1 milliGRAM(s) IV Push every 4 hours  HYDROmorphone  Injectable 2 milliGRAM(s) IV Push every 1 hour PRN  LORazepam   Injectable 1 milliGRAM(s) IV Push every 4 hours PRN  LORazepam   Injectable 0.5 milliGRAM(s) IV Push once  ondansetron Injectable 4 milliGRAM(s) IV Push every 6 hours PRN  sodium chloride 0.9% Bolus 250 milliLiter(s) IV Bolus once  sodium chloride 0.9%. 1000 milliLiter(s) IV Continuous <Continuous>    O)  T(C): 36.3 (12-07-23 @ 23:15), Max: 36.4 (12-07-23 @ 04:32)  HR: 89 (12-07-23 @ 23:15) (60 - 98)  BP: 71/55 (12-07-23 @ 23:15) (71/55 - 114/80)  RR: 20 (12-07-23 @ 23:15) (17 - 20)  SpO2: 92% (12-07-23 @ 23:15) (92% - 97%)  PHYSICAL EXAM:  GENERAL: Malnouroshed female lying in bed with minimal moaning  HEAD:  Atraumatic, Normocephalic  HEART: Regular rate and rhythm; No murmurs, rubs, or gallops  RESPIRATORY: CTA B/L, No W/R/R  ABDOMEN: Soft, Nontender, Nondistended; Bowel sounds present  EXTREMITIES:  1+ Faint Peripheral Pulses, No clubbing, cyanosis, or edema  SKIN: warm, dry, normal color, no rash or abnormal lesions  A/P) 63y Female w/ history of Stage IV duodenal cancer w/ mets, Anxiety, HTN & COPD presented w/ recurrent Abdominal ascites, elevated creatinine on CKD?, sepsis, possibly secondary to enterocolitis on comfort palliative care (DNR/DNI)  - second fluid challenge 250 ml of NS  - continue comfort care hydromorphone and ativan  - family aware of grave condition and onboard with comfort care therapeutics

## 2023-12-08 NOTE — PROGRESS NOTE ADULT - TIME BILLING
Evaluation of patient, review of medical records and collaboration with care team and family
I have spent a total of 65 minutes to prepare to see the patient, obtaining and reviewing history, physical examination, explaining the diagnosis, prognosis and treatment plan with the patient's family during the family meeting today. I also have spent the time ordering studies and testing, interpreting results, medicine reconciliation, subspecialty consultation and documentation as above.
Evaluation of patient, review of medical records and collaboration with care team and family

## 2023-12-09 LAB
CULTURE RESULTS: SIGNIFICANT CHANGE UP
CULTURE RESULTS: SIGNIFICANT CHANGE UP
GRAM STN FLD: SIGNIFICANT CHANGE UP
GRAM STN FLD: SIGNIFICANT CHANGE UP
SPECIMEN SOURCE: SIGNIFICANT CHANGE UP
SPECIMEN SOURCE: SIGNIFICANT CHANGE UP

## 2023-12-10 RX ORDER — ALBUTEROL 90 UG/1
2 AEROSOL, METERED ORAL
Qty: 0 | Refills: 0 | DISCHARGE

## 2023-12-10 RX ORDER — ALPRAZOLAM 0.25 MG
1 TABLET ORAL
Qty: 0 | Refills: 0 | DISCHARGE

## 2023-12-10 RX ORDER — QUETIAPINE FUMARATE 200 MG/1
1 TABLET, FILM COATED ORAL
Qty: 0 | Refills: 0 | DISCHARGE

## 2023-12-10 RX ORDER — AMLODIPINE BESYLATE AND BENAZEPRIL HYDROCHLORIDE 10; 20 MG/1; MG/1
1 CAPSULE ORAL
Qty: 0 | Refills: 0 | DISCHARGE

## 2023-12-10 RX ORDER — SUCRALFATE 1 G
1 TABLET ORAL
Qty: 0 | Refills: 0 | DISCHARGE

## 2023-12-10 RX ORDER — PANTOPRAZOLE SODIUM 20 MG/1
1 TABLET, DELAYED RELEASE ORAL
Qty: 0 | Refills: 0 | DISCHARGE

## 2023-12-10 RX ORDER — SERTRALINE 25 MG/1
1 TABLET, FILM COATED ORAL
Qty: 0 | Refills: 0 | DISCHARGE

## 2023-12-10 RX ORDER — HYDROCHLOROTHIAZIDE 25 MG
1 TABLET ORAL
Qty: 0 | Refills: 0 | DISCHARGE

## 2023-12-10 RX ORDER — MONTELUKAST 4 MG/1
1 TABLET, CHEWABLE ORAL
Qty: 0 | Refills: 0 | DISCHARGE

## 2023-12-10 RX ORDER — FENTANYL CITRATE 50 UG/ML
1 INJECTION INTRAVENOUS
Qty: 0 | Refills: 0 | DISCHARGE

## 2023-12-11 DIAGNOSIS — N18.30 CHRONIC KIDNEY DISEASE, STAGE 3 UNSPECIFIED: ICD-10-CM

## 2023-12-11 DIAGNOSIS — I12.9 HYPERTENSIVE CHRONIC KIDNEY DISEASE WITH STAGE 1 THROUGH STAGE 4 CHRONIC KIDNEY DISEASE, OR UNSPECIFIED CHRONIC KIDNEY DISEASE: ICD-10-CM

## 2023-12-11 DIAGNOSIS — R62.7 ADULT FAILURE TO THRIVE: ICD-10-CM

## 2023-12-11 DIAGNOSIS — E87.20 ACIDOSIS, UNSPECIFIED: ICD-10-CM

## 2023-12-11 DIAGNOSIS — J44.9 CHRONIC OBSTRUCTIVE PULMONARY DISEASE, UNSPECIFIED: ICD-10-CM

## 2023-12-11 DIAGNOSIS — E78.5 HYPERLIPIDEMIA, UNSPECIFIED: ICD-10-CM

## 2023-12-11 DIAGNOSIS — E43 UNSPECIFIED SEVERE PROTEIN-CALORIE MALNUTRITION: ICD-10-CM

## 2023-12-11 DIAGNOSIS — C78.7 SECONDARY MALIGNANT NEOPLASM OF LIVER AND INTRAHEPATIC BILE DUCT: ICD-10-CM

## 2023-12-11 DIAGNOSIS — E88.09 OTHER DISORDERS OF PLASMA-PROTEIN METABOLISM, NOT ELSEWHERE CLASSIFIED: ICD-10-CM

## 2023-12-11 DIAGNOSIS — C17.0 MALIGNANT NEOPLASM OF DUODENUM: ICD-10-CM

## 2023-12-11 DIAGNOSIS — Z51.5 ENCOUNTER FOR PALLIATIVE CARE: ICD-10-CM

## 2023-12-11 DIAGNOSIS — C78.6 SECONDARY MALIGNANT NEOPLASM OF RETROPERITONEUM AND PERITONEUM: ICD-10-CM

## 2023-12-11 DIAGNOSIS — R53.81 OTHER MALAISE: ICD-10-CM

## 2023-12-11 DIAGNOSIS — B97.89 OTHER VIRAL AGENTS AS THE CAUSE OF DISEASES CLASSIFIED ELSEWHERE: ICD-10-CM

## 2023-12-11 DIAGNOSIS — N17.9 ACUTE KIDNEY FAILURE, UNSPECIFIED: ICD-10-CM

## 2023-12-11 DIAGNOSIS — E87.1 HYPO-OSMOLALITY AND HYPONATREMIA: ICD-10-CM

## 2023-12-11 DIAGNOSIS — T45.1X5A ADVERSE EFFECT OF ANTINEOPLASTIC AND IMMUNOSUPPRESSIVE DRUGS, INITIAL ENCOUNTER: ICD-10-CM

## 2023-12-11 DIAGNOSIS — R18.0 MALIGNANT ASCITES: ICD-10-CM

## 2023-12-11 DIAGNOSIS — I95.9 HYPOTENSION, UNSPECIFIED: ICD-10-CM

## 2023-12-11 DIAGNOSIS — K76.0 FATTY (CHANGE OF) LIVER, NOT ELSEWHERE CLASSIFIED: ICD-10-CM

## 2023-12-11 DIAGNOSIS — B19.20 UNSPECIFIED VIRAL HEPATITIS C WITHOUT HEPATIC COMA: ICD-10-CM

## 2023-12-11 DIAGNOSIS — A08.4 VIRAL INTESTINAL INFECTION, UNSPECIFIED: ICD-10-CM

## 2023-12-11 DIAGNOSIS — C17.9 MALIGNANT NEOPLASM OF SMALL INTESTINE, UNSPECIFIED: ICD-10-CM

## 2023-12-11 DIAGNOSIS — A41.89 OTHER SPECIFIED SEPSIS: ICD-10-CM

## 2023-12-11 DIAGNOSIS — F41.9 ANXIETY DISORDER, UNSPECIFIED: ICD-10-CM

## 2023-12-11 DIAGNOSIS — Z66 DO NOT RESUSCITATE: ICD-10-CM

## 2023-12-11 DIAGNOSIS — R53.1 WEAKNESS: ICD-10-CM

## 2023-12-11 DIAGNOSIS — R64 CACHEXIA: ICD-10-CM

## 2023-12-11 DIAGNOSIS — K59.00 CONSTIPATION, UNSPECIFIED: ICD-10-CM

## 2023-12-11 DIAGNOSIS — C78.00 SECONDARY MALIGNANT NEOPLASM OF UNSPECIFIED LUNG: ICD-10-CM

## 2023-12-12 LAB
NON-GYNECOLOGICAL CYTOLOGY STUDY: SIGNIFICANT CHANGE UP
NON-GYNECOLOGICAL CYTOLOGY STUDY: SIGNIFICANT CHANGE UP

## 2024-03-28 NOTE — PATIENT PROFILE ADULT - FUNCTIONAL ASSESSMENT - DAILY ACTIVITY SCORE.
Chief Complaint   Patient presents with    6 Month Follow-Up    Hypertension       Originally  patient here today for chest tightness and hypertension. Patient was seen in [facility] ER on Saturday for these problems  Seen by nephrology as well 6 days back and started salt tab and re-started diuretic    Hx of Chest tightness  and seen in ER with elevated BP- resolved ed after BP comes down and none after and before.  For low NA nephrology limited water intake to 1.5 liters            6 month follow up.    EKG done 6-4-2023.    No more chest pain    Sob on exertion    Denied chest pain, palpitation, dizziness or edema    Hx of Diverticulitis and low back with intermittent nonspecific leg pain  CT abd showed femoral artery calcification and CTA done per pat after ordered by pcp and awaiting result      Smokes 1/2 ppd for yrs 40 yrs    FHX  Brother 1 had CABG age 70  Brother 2 had cabg in his late 50's      PMHX- ADDENDED AFTER PAT LEFT OFFICE  PAD  S/p LEFT Grafts femorotibial bypass  with CRYOVEIN ON 11/15/2023  AT [facility]  START ON [facility]AN AFTER THE BYPASS FOR PATENCY  LATER CHANGED TO COUMADIN    Ct CHEST NO PE BUT SEVERE CORONARY CALCIFICATION            Past Surgical History:   Procedure Laterality Date    CARDIOVASCULAR STRESS TEST  01/03/2012    No evidence of stress induced ischemia or prior transmural myocardial infarction noted on the study. EF is 56%. EKG did not demonstrate evidence of ischemic changes. The pt was asymptomatic.    COLONOSCOPY  05/2023    DOPPLER ECHOCARDIOGRAPHY  01/02/2012    Left ventricle was mildly dilated. EF 55-65%. Aortic valve had mild to moderate regurgitation. Tricuspid valve had mild regurgitation. A small pericardial effusion was identified. There was no evidence of hemodynamic compromise. The atrial septum bows from right to left, consistent with increased right atrial pressure.       Allergies   Allergen Reactions    Pcn [Penicillins] Hives     Hives in mouth. 
24

## 2024-07-25 NOTE — H&P ADULT - PROBLEM SELECTOR PROBLEM 4
Peripheral/Paravertebral block performed without complications.  VSS.  Pt tolerated well.  Will continue to monitor.     Laurent PAT   Tobacco user
